# Patient Record
Sex: MALE | Race: WHITE | Employment: FULL TIME | URBAN - METROPOLITAN AREA
[De-identification: names, ages, dates, MRNs, and addresses within clinical notes are randomized per-mention and may not be internally consistent; named-entity substitution may affect disease eponyms.]

---

## 2017-01-02 ENCOUNTER — TELEPHONE (OUTPATIENT)
Dept: FAMILY MEDICINE | Facility: CLINIC | Age: 52
End: 2017-01-02

## 2017-01-02 NOTE — TELEPHONE ENCOUNTER
Reason for Call:  Other call back    Detailed comments: patients wife states patient had a stroke 3 days ago and is in the hospital, she would like to speak to Dr. Cano or his care team    Phone Number Patient can be reached at: Other phone number:  [phone]    Best Time: as soon as possible    Can we leave a detailed message on this number? YES    Call taken on 1/2/2017 at 8:08 AM by Shira Murguia

## 2017-01-02 NOTE — TELEPHONE ENCOUNTER
Patient's wife called, and she just wanted to let you know that her  had a stroke and is at an out of state hospital. She says that he is stable. They will be transferring to [place] for rehab for 4-6 weeks, she does not know where yet.    Manuel Torres RN

## 2017-01-05 ENCOUNTER — MEDICAL CORRESPONDENCE (OUTPATIENT)
Dept: HEALTH INFORMATION MANAGEMENT | Facility: CLINIC | Age: 52
End: 2017-01-05

## 2017-01-06 ENCOUNTER — HOSPITAL ENCOUNTER (INPATIENT)
Facility: CLINIC | Age: 52
LOS: 18 days | Discharge: HOME | DRG: 057 | End: 2017-01-24
Attending: PHYSICAL MEDICINE & REHABILITATION | Admitting: PHYSICAL MEDICINE & REHABILITATION
Payer: COMMERCIAL

## 2017-01-06 DIAGNOSIS — I69.351 HEMIPARESIS AFFECTING RIGHT SIDE AS LATE EFFECT OF CEREBROVASCULAR ACCIDENT (H): ICD-10-CM

## 2017-01-06 DIAGNOSIS — K59.01 SLOW TRANSIT CONSTIPATION: ICD-10-CM

## 2017-01-06 DIAGNOSIS — R49.0 DYSPHONIA: ICD-10-CM

## 2017-01-06 DIAGNOSIS — I61.9 HEMORRHAGIC STROKE (H): Primary | ICD-10-CM

## 2017-01-06 DIAGNOSIS — F51.02 ADJUSTMENT INSOMNIA: ICD-10-CM

## 2017-01-06 DIAGNOSIS — R25.2 SPASTICITY: ICD-10-CM

## 2017-01-06 DIAGNOSIS — I10 BENIGN ESSENTIAL HYPERTENSION: ICD-10-CM

## 2017-01-06 PROBLEM — I69.919 COGNITIVE DEFICITS AS LATE EFFECT OF CEREBROVASCULAR DISEASE: Status: ACTIVE | Noted: 2017-01-06

## 2017-01-06 PROBLEM — Z86.73 HISTORY OF STROKE: Status: ACTIVE | Noted: 2017-01-06

## 2017-01-06 PROBLEM — G81.91 RIGHT HEMIPARESIS (H): Status: ACTIVE | Noted: 2017-01-06

## 2017-01-06 PROBLEM — R46.89 COGNITIVE AND BEHAVIORAL CHANGES: Status: ACTIVE | Noted: 2017-01-06

## 2017-01-06 PROBLEM — R47.1 DYSARTHRIA: Status: ACTIVE | Noted: 2017-01-06

## 2017-01-06 PROBLEM — R41.89 COGNITIVE AND BEHAVIORAL CHANGES: Status: ACTIVE | Noted: 2017-01-06

## 2017-01-06 PROCEDURE — 25000125 ZZHC RX 250: Performed by: PHYSICAL MEDICINE & REHABILITATION

## 2017-01-06 PROCEDURE — 25000132 ZZH RX MED GY IP 250 OP 250 PS 637: Performed by: PHYSICAL MEDICINE & REHABILITATION

## 2017-01-06 PROCEDURE — 12800006 ZZH R&B REHAB

## 2017-01-06 RX ORDER — POLYETHYLENE GLYCOL 3350 17 G/17G
17 POWDER, FOR SOLUTION ORAL DAILY PRN
Status: DISCONTINUED | OUTPATIENT
Start: 2017-01-06 | End: 2017-01-10

## 2017-01-06 RX ORDER — HYDRALAZINE HYDROCHLORIDE 10 MG/1
10 TABLET, FILM COATED ORAL 4 TIMES DAILY PRN
Status: DISCONTINUED | OUTPATIENT
Start: 2017-01-06 | End: 2017-01-24 | Stop reason: HOSPADM

## 2017-01-06 RX ORDER — ACETAMINOPHEN 325 MG/1
650 TABLET ORAL EVERY 6 HOURS PRN
Status: DISCONTINUED | OUTPATIENT
Start: 2017-01-06 | End: 2017-01-24 | Stop reason: HOSPADM

## 2017-01-06 RX ORDER — AMOXICILLIN 250 MG
2 CAPSULE ORAL AT BEDTIME
Status: DISCONTINUED | OUTPATIENT
Start: 2017-01-06 | End: 2017-01-10

## 2017-01-06 RX ORDER — AMOXICILLIN 250 MG
1-2 CAPSULE ORAL 2 TIMES DAILY PRN
Status: DISCONTINUED | OUTPATIENT
Start: 2017-01-06 | End: 2017-01-06

## 2017-01-06 RX ORDER — LISINOPRIL 20 MG/1
20 TABLET ORAL DAILY
Status: DISCONTINUED | OUTPATIENT
Start: 2017-01-06 | End: 2017-01-13

## 2017-01-06 RX ORDER — LANOLIN ALCOHOL/MO/W.PET/CERES
3 CREAM (GRAM) TOPICAL
Status: DISCONTINUED | OUTPATIENT
Start: 2017-01-06 | End: 2017-01-18

## 2017-01-06 RX ADMIN — ENOXAPARIN SODIUM 40 MG: 40 INJECTION SUBCUTANEOUS at 16:35

## 2017-01-06 RX ADMIN — SENNOSIDES AND DOCUSATE SODIUM 2 TABLET: 8.6; 5 TABLET ORAL at 20:25

## 2017-01-06 RX ADMIN — LISINOPRIL 20 MG: 20 TABLET ORAL at 13:29

## 2017-01-06 NOTE — H&P
[facility]   Acute Rehabilitation Unit  Admission History and Physical    chief complaint   Left basal ganglia hemorrhagic CVA, hypertensive emergency    History of Present illness  Maldonado Mendiola is a 51 year old right hand dominant male history untreated hypertension presents from neurology service at , [place] after suffering left basal ganglia hemorrhagic stroke. Patient was on vacation with the family on [facility].  On Thursday 12/29/16 at approx 5:30am the patient experienced sudden onset right sided weakness while on toilet, he fell over,  Admits to associated word finding issues, and confusion at that time.  Denies any LOC, head trauma, CP, SOB, KING, loss bowel/lbadder at that time.  Wife helped him sit up and called 911 from hotel room.  EMS arrived and transported to ER with /99.  Imaging was accomplished at that time revealing for pathology above.  Initially treated in the ICU with a nicardipine drip, presumably for hypertensive emergency. No intervention. Was transitioned to lisinopril which he has reportedly been stable on.      Documentation from discharging facility was sparse.  Called and spoke to senior neurology resident, Laura Amezquita MD who stated goal BP is normotensive and that DVT prophylaxis with lovenox was not contraindicated.      Prior Functional Status: before injury reports independent mobility and ADLs    Current Functional Status:   PT - bed mobility with max assist   OT - seated ADLs with non dominant hand  SLP - right facial droop, regular thins for now. Aphasia and questionable cognitive impairment.     Restrictions: up with assist     PAST Medical History   Reviewed and updated in Epic.  Past Medical History   Diagnosis Date     Anxiety        Surgical History  Reviewed and updated in Epic.  No past surgical history on file.    SOCIAL HISTORY  Reviewed and updated in Epic.  Marital Status:   with one 15 yo son, 19 yo stepdaughter   Living situation: [facility], primary home two story with one flight up to bed/bath, two steps to enter. Lives with wife and son. They own [place] second [place] in [place] which is one story.   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    Activities: singer in band, enjoyed spending time [place]  Tobacco use: rare pipe tobacco on weekends   Alcohol use: social  Illicit drug use: denies   Social History     Social History     Marital Status:      Spouse Name: N/A     Number of Children: N/A     Years of Education: N/A     Occupational History     Not on file.     Social History Main Topics     Smoking status: Never Smoker      Smokeless tobacco: Never Used     Alcohol Use: Yes      Comment: 6 pack per week     Drug Use: No     Sexual Activity:     Partners: Female     Birth Control/ Protection: Surgical      Comment: vasectomy     Other Topics Concern     Parent/Sibling W/ Cabg, Mi Or Angioplasty Before 65f 55m? No     Social History Narrative       FAMILY HISTORY  Reviewed and updated in Epic.  Family History   Problem Relation Age of Onset     Alcohol/Drug Paternal Grandmother      CEREBROVASCULAR DISEASE No family hx of      Breast Cancer No family hx of      Cancer - colorectal No family hx of      Prostate Cancer No family hx of            Medications  Scheduled meds  Prescriptions prior to admission   Medication Sig Dispense Refill Last Dose     escitalopram (LEXAPRO) 10 MG tablet Take 1 tablet (10 mg) by mouth daily 30 tablet 1      propranolol (INDERAL) 20 MG tablet 1 to 2 pills, 30 min-1 hour prior to anxiety inducing event. 90 tablet 1 Taking     oxyCODONE-acetaminophen (PERCOCET) 5-325 MG per tablet 1 to 2 tabs if kidney stone flare 2 tablet 0 Taking     HYDROcodone-acetaminophen (NORCO) 5-325 MG per tablet Take 1 tablet by mouth every 6 hours as needed  for moderate to severe pain 42 tablet 0 Taking     ibuprofen (ADVIL) 200 MG capsule Take 200 mg by mouth every 4 hours as needed. Takes two at a time but can go up to about six with in a few hours.   Taking       ALLERGIES   No Known Allergies      Review of Systems  A 10 point ROS was performed and negative unless otherwise noted in HPI. Continent, voiding and BM on his own.       Physical Exam  VITAL SIGNS:  /69 mmHg  Pulse 68  Temp(Src) 96.5  F (35.8  C) (Oral)  Resp 16  Ht 1.829 m (6')  Wt 78.835 kg (173 lb 12.8 oz)  BMI 23.57 kg/m2  SpO2 98%  BMI:  Estimated body mass index is 23.57 kg/(m^2) as calculated from the following:    Height as of this encounter: 1.829 m (6').    Weight as of this encounter: 78.835 kg (173 lb 12.8 oz).     General: alert and oriented x 3, NAD  HEENT: AT/NC, PERRL  Pulmonary: CTA b/l, no wheezing, rhonchi, rales  Cardiovascular: S1/S2, 3/5 systolic ejection murmer heard best apex  Abdominal: Pos bowel sounds, soft, non tender, non distended  Extremities: moving all extremities, no edema, good pulses  MSK/neuro:   Mental Status:  Cooperative, appropriate   Cranial Nerves: facial droop right , otherwise grossly intact 2-12 (9/10 not examined)   Sensory: impaired sensation right face, right UE and right LE   Strength: 0/5 UE/LE right,  5/5 UE/LE left    Reflexes: normal throughout   Bueno's test: negative   Babinski reflex: negative    Tone:no increased tone, no clonus    Abnormal movements: none   Coordination: mild dysmetria with left arm vs fatigue, unable to perform right.  no pronator drift left   Speech: aphasia    Cognition: slower more deliberate speech, no other gross deficits noted converstionally. no obvious neglect, intact short term/long term memory, serial 7 ok, naming ok.   Skin: warm and dry    Labs  Not available, f/u admission labs.     IMAGING  From discharge summary   --CT head 12/29: left BG IPH with 2 smaller foci of hemorrhage noted in this  area  --CTA head / neck 12/29: w/o evidence of AVM or aneurysm, no significant cervical carotid or intracranial stenosis. Mild mid basilar artery stenosis (likley related to atherosclerotic disease)  --EKG with NSR    IMPRESSION/PLAN:  Maldonado Mendiola is a 51 year old right hand dominant male history untreated hypertension with new left basal ganglia stroke 2/2 HTN emergency.  Profound right sided weakness, mild aphasia, right facial droop.  [place] out of state hospital, Admitted acute inpatient rehab 01/06/2017.        1.  Rehabilitation: begin acute inpatient rehabilitation from a multidisciplinary approach.  PT, OT and SLP  For total 3 hours daily, in addition to rehab nursing and close management of physiatrist.      2.  Left basal ganglia stroke: profound right sided weakness UE/LE, aphasia.    -BP control, goal normotensive    -continue lisinopril 20mg daily   -prn hydralazine if needed   -rehab plan above   -f/u neurology / stroke service locally (needs new pt visit or consult)    3.  Hypertension;  Recently treated for HTN emergency s/p IPH,  Has been stable on lisinipril 20mg daily which is continued.  Given history we've added prn hydralazine for BP >150/95.  Consider IM consult if becomes diff to control.      4.  Heart murmer: 3/5 systolic ejection murmer best heard at apex. Pt states informed about this during his acute hospital stay this week but before that was unaware.  Appears stable without signs of heart failure.  Denies CP, SOB, swelling. Given history including HTN emergency feel may warrant workup. EKG reportedly normal per chart. Consider echocardiogram. IM/cards f/u if appropriate.     5.  Risk of electrolyte abnormality: f/u admission BMP    6.  Risk of anemia: f/u admission CBC     7.  F/E/N:  Appears well hydrated, f/u BMP, tolerating regular diet / thins continued, f/u SLP eval.       Additional rehab concerns:   Adjustment to disability:  Clinical psychology to eval and treat  if appropriate  FEN: well hydrated, tolerating regular diet, nutrition consult if appropriate.  Bowel: bowel program if deemed necessary   Bladder: PVRs and/or bladder program if deemed necessary  DVT Prophylaxis: chemical with Lovenox daily.  Significantly impaired mobility.    GI Prophylaxis: PPI or BID zantac if appropriate  Code: Full code   Disposition: Home  ELOS:  3-4 weeks   Rehab prognosis:  Fair - Good   Follow up Appointments / Discharge planning:   F/u with PCP  Establish care with neurology/stroke provider locally  Consider evaluation of heart murmur     Seen and discussed with Rm PM&R staff physician   Rashad Barrientos, DO  PM&R, PGY2    Post Admission Physician Evaluation:    I have compared Maury's condition on admission to acute rehabilitation to that outlined in the preadmission screen. History and physical exam performed by me. No significant differences are identified and the patient remains appropriate for an inpatient rehabilitation facility level of care to manage medical issues and address functional impairments due to (rehabilitation diagnosis) his hemorrhagic stroke.    Comorbid medical conditions being managed: as listed above.    Prior functional level: completely independent.    Present function: severe dependency in mobility, ADLs, with aphasia and possible cognitive deficits.    Anticipated rehabilitation course: improving function in order to discharge home with help of family in approximately 3-4 weeks. May need use of Wheelchair at time of discharge depending upon progress.    Will benefit from intensive rehabilitation includin minutes each of PT, OT and SLP    Rehabilitation nursing  Close management by physiatry    Prognosis: good    Estimated length of stay: 3-4 weeks      I, Dr. Abdalla, have seen and examined the patient. I have reviewed the above resident's note and agree with content.

## 2017-01-06 NOTE — IP AVS SNAPSHOT
[facility]    [address] [facility] [address]    Phone:  [phone]                                       After Visit Summary   1/6/2017    Maldonado Mendiola    MRN: [id number]           After Visit Summary Signature Page     I have received my discharge instructions, and my questions have been answered. I have discussed any challenges I see with this plan with the nurse or doctor.    ..........................................................................................................................................  Patient/Patient Representative Signature      ..........................................................................................................................................  Patient Representative Print Name and Relationship to Patient    ..................................................               ................................................  Date                                            Time    ..........................................................................................................................................  Reviewed by Signature/Title    ...................................................              ..............................................  Date                                                            Time

## 2017-01-06 NOTE — IP AVS SNAPSHOT
MRN:[id number]                      After Visit Summary   1/6/2017    Maldonado Mendiola    MRN: [id number]           Thank you!     Thank you for choosing [facility] for your care. Our goal is always to provide you with excellent care. Hearing back from our patients is one way we can continue to improve our services. Please take a few minutes to complete the written survey that you may receive in the mail after you visit with us. Thank you!        Patient Information     Date Of Birth          1965        About your hospital stay     You were admitted on:  January 6, 2017 You last received care in the:   [facility]    You were discharged on:  January 24, 2017        Reason for your hospital stay       Admitted to acute rehab after your stroke. Pleased with some of the initial functional recovery and ready for discharge and follow up outpatient therapies.                  Who to Call     For medical emergencies, please call 911.  For non-urgent questions about your medical care, please call your primary care provider or clinic, [phone]          Attending Provider     Provider    Sumeet Abdalla MD Crowe, Scott C, MD       Primary Care Provider Office Phone # Fax #    Edmond Cano PA-C [phone]       [address] [address]        After Care Instructions     Activity       Your activity upon discharge: recommend some supervision initially on stairs until that's more thoroughly solid in performance. Giv-Lizz sling may help with comfort and shoulder position but doesn't have to be worn all of the time. No driving at this time. Outpatient occupational therapist may screen this as you move forward when may be safe though often a formal behind the wheel assessment.            Diet       Follow this diet upon discharge: Regular consistency now fine. See stroke book for healthy diet information.                  Your  next 10 appointments already scheduled     Jan 25, 2017  2:00 PM   Evaluation with Raghav Matute, OT   [facility], [place], Occupational Therapy - Outpatient ([facility])    [address]  [place] [address]   [phone]            Jan 26, 2017  8:30 AM   Evaluation with Rochelle Capone, PT   [facility], [place], Physical Therapy - Outpatient ([facility])    [address]  [place] [address]   [phone]            Jan 30, 2017  2:20 PM   SHORT with Edmond Cano PA-C   [facility] ([facility])    [address] [place] [id number] [phone]            Mar 14, 2017  9:00 AM   (Arrive by 8:45 AM)   New Stroke with Franklin Hidalgo MD   [facility] Neurology ([facility])    [address] [address] [phone]            Mar 15, 2017  8:40 AM   (Arrive by 8:25 AM)   New Patient Visit with Miguel Beauchamp MD   [facility] Physical Medicine and Rehabilitation ([facility])    [address] [address] [phone]              Additional Services     Occupational Therapy Referral       [facility] provides Therapy evaluation and treatment and many specialty services across the [place] system.  If requesting a specialty program, please choose from the list below.    If you have not heard from the scheduling office within 2 business days, please call [phone].  Treatment: Evaluation & Treatment  Special Instructions/Modalities: continue outpatient PT, OT and SLP after discharge from acute rehab for hemorrhagic stroke.    Please be aware that coverage of these services is subject to the terms and limitations of your health insurance plan.  Call member services at your health plan with any benefit  or coverage questions.            Physical Therapy Referral       [facility] provides Therapy evaluation and treatment and many specialty services across the [facility].  If requesting a specialty program, please choose from the list below.    If you have not heard from the scheduling office within 2 business days, please call [phone].  Treatment: Evaluation & Treatment  Special Instructions/Modalities: continue outpatient PT, OT and SLP after discharge from acute rehab for hemorrhagic stroke.    Please be aware that coverage of these services is subject to the terms and limitations of your health insurance plan.  Call member services at your health plan with any benefit or coverage questions.            Speech Therapy Referral       [facility] provides Therapy evaluation and treatment and many specialty services across the [facility] system.  If requesting a specialty program, please choose from the list below.    If you have not heard from the scheduling office within 2 business days, please call [phone].  Treatment: Evaluation & Treatment  Special Instructions/Modalities: continue outpatient PT, OT and SLP after discharge from acute rehab for hemorrhagic stroke.    Please be aware that coverage of these services is subject to the terms and limitations of your health insurance plan.  Call member services at your health plan with any benefit or coverage questions.                  Further instructions from your care team       Follow-Up Appointments:      You are scheduled to see primary provider, Edmond Cano PA-C  on Monday, January 30, 2017 at 2:20 pm.  Blood pressure management and refill prescriptions should go through primary.    Address  Edmond Cano PA-C                          [address]                           [place] [address]  Phone    [phone]  FAX                  [phone]        You are scheduled to see Dr. Hidalgo at [facility] on Tuesday, March 14, 2017 at 8:45 am.    Address- Dr. Rocky FAY of [facility]                          Neurology Clinic                             3rd Floor    [address] [place] [address]  Phone   [phone]        You are scheduled to see Dr. Miguel Beauchamp on Wednesday, March 15, 2017 at 8:40 am.    Address  Dr. Miguel FAY of [facility]                          Physical Medicine and Rehabilitation Clinic                          3rd Floor    [address] [place] [address]  Phone   Shivani [phone]                                                       To reduce the risk of subsequent stroke there are several important factors including optimal management of anticoagulants, blood pressure, cholesterol, diabetes and smoking abstinence.    Blood Pressure:  Keeping your blood pressures less than 130/80 has been shown to reduce risk of recurrent stroke. Recording your blood pressure and heart rate daily can help you and your providers make decisions on optimal management. You are encouraged to bring your log book with you to your primary physician.    You are currently on lisinopril to help control your blood pressure. Several lifestyle modifications have been associated with blood pressure reduction and are an important part of a comprehensive plan. These include: weight loss (if over-weight); a diet low in salt and cholesterol and rich in fruits and vegetables; regular aerobic physical activity and limited alcohol consumption.    Of note Mac, your blood pressures have started to drift bit lower which might allow for reduction of your lisinopril dose to 5 mg or even consider going off eventually.    Anticoagulation:  Your stroke type was bleeding (hemorrhagic) so no anticoagulation is  "indicated.    Diabetes:  You do not have diabetes though it is important to continue monitoring for this in the future with your primary provider.    Cholesterol:  Traditional target levels for LDL cholesterol or \"bad cholesterol\" is less than 130 however once you have had a stroke, your target LDL level is now less than 70. Additional recommendations such as increasing your HDL or \"good\" cholesterol and lowering your triglyceride level can also be important.        Smoking:  Finally one of the most important modifiable risk factors is to not smoke so don't start now Mac... This includes cigarettes, pipes, cigars, chewing tobacco and second hand smoke. Support through counseling, nicotine replacement, and oral smoking-cessation medications may all be helpful. Often people have been able to quit during their hospitalization but once returning to their familiar environment, the urges can be stronger. If this is the case, we encourage you to get support. There are numerous options, start by talking with your doctor.      Pending Results     No orders found from 1/5/2017 to 1/7/2017.            Statement of Approval     Ordered          01/24/17 0851  I have reviewed and agree with all the recommendations and orders detailed in this document.   EFFECTIVE NOW     Approved and electronically signed by:  Miguel Beauchamp MD             Admission Information        Provider Department Dept Phone    1/6/2017 Miguel Beauchamp MD  [facility] [phone]      Your Vitals Were     Blood Pressure Pulse Temperature    108/58 mmHg 86 98.6  F (37  C) (Oral)    Respirations Height Weight    16 1.829 m (6') 71.759 kg (158 lb 3.2 oz)    BMI (Body Mass Index) Pulse Oximetry       21.45 kg/m2 98%       MyChart Information     [facility] lets you send messages to your doctor, view your test results, renew your prescriptions, schedule appointments and more. To sign up, go to [email or web address] . Click on \"Log in\" on the left side " "of the screen, which will take you to the Welcome page. Then click on \"Sign up Now\" on the right side of the page.     You will be asked to enter the access code listed below, as well as some personal information. Please follow the directions to create your username and password.     Your access code is: [email or web address]  Expires: 2017 12:15 PM     Your access code will  in 90 days. If you need help or a new code, please call your [facility] clinic or [phone].        Care EveryWhere ID     This is your Care EveryWhere ID. This could be used by other organizations to access your [facility] medical records  [id number]           Review of your medicines      START taking        Dose / Directions    baclofen 10 MG tablet   Commonly known as:  LIORESAL   Used for:  Spasticity        Dose:  5-10 mg   Take 0.5-1 tablets (5-10 mg) by mouth 3 times daily   Quantity:  90 tablet   Refills:  3       diphenhydrAMINE 25 MG capsule   Commonly known as:  BENADRYL   Used for:  Adjustment insomnia        Dose:  25-50 mg   Take 1-2 capsules (25-50 mg) by mouth nightly as needed for sleep   Refills:  0       lisinopril 10 MG tablet   Commonly known as:  PRINIVIL/ZESTRIL   Used for:  Benign essential hypertension        Dose:  10 mg   Take 1 tablet (10 mg) by mouth daily   Quantity:  30 tablet   Refills:  0       senna-docusate 8.6-50 MG per tablet   Commonly known as:  SENOKOT-S;PERICOLACE   Used for:  Slow transit constipation        Dose:  1-2 tablet   Take 1-2 tablets by mouth daily as needed for constipation   Refills:  0         STOP taking     ADVIL 200 MG capsule   Generic drug:  ibuprofen           escitalopram 10 MG tablet   Commonly known as:  LEXAPRO           HYDROcodone-acetaminophen 5-325 MG per tablet   Commonly known as:  NORCO           oxyCODONE-acetaminophen 5-325 MG per tablet   Commonly known as:  PERCOCET           propranolol 20 MG tablet   Commonly known as:  INDERAL                Where to get your " medicines      These medications were sent to [id number] - [place], [place] - [address]  [address], [place] [address]     Phone:  [phone]    - baclofen 10 MG tablet  - lisinopril 10 MG tablet      Some of these will need a paper prescription and others can be bought over the counter. Ask your nurse if you have questions.     You don't need a prescription for these medications    - diphenhydrAMINE 25 MG capsule  - senna-docusate 8.6-50 MG per tablet             Protect others around you: Learn how to safely use, store and throw away your medicines at www.disposemymeds.org.             Medication List: This is a list of all your medications and when to take them. Check marks below indicate your daily home schedule. Keep this list as a reference.      Medications           Morning Afternoon Evening Bedtime As Needed    baclofen 10 MG tablet   Commonly known as:  LIORESAL   Take 0.5-1 tablets (5-10 mg) by mouth 3 times daily   Last time this was given:  5 mg on 1/24/2017  6:39 AM                                diphenhydrAMINE 25 MG capsule   Commonly known as:  BENADRYL   Take 1-2 capsules (25-50 mg) by mouth nightly as needed for sleep   Last time this was given:  50 mg on 1/23/2017  8:54 PM                                   lisinopril 10 MG tablet   Commonly known as:  PRINIVIL/ZESTRIL   Take 1 tablet (10 mg) by mouth daily   Last time this was given:  10 mg on 1/24/2017  7:59 AM                                senna-docusate 8.6-50 MG per tablet   Commonly known as:  SENOKOT-S;PERICOLACE   Take 1-2 tablets by mouth daily as needed for constipation   Last time this was given:  2 tablets on 1/24/2017  6:43 AM

## 2017-01-06 NOTE — PLAN OF CARE
Problem: Goal/Outcome  Goal: Goal Outcome Summary  Pt arrived to the unit from [facility], outside hospital at about 1130 and admitted to room 545. He has right sided weakness re to diagnosis, he needed assist of one person to get in to bed. He was alert and oriented x 3,denies pain or discomfort. Pt/ family were oriented to the room, call light system, bed control, meal times and routines of the unit. He needs a complete skin assessment, PTA assessment over the next 24 hrs.

## 2017-01-07 LAB
ANION GAP SERPL CALCULATED.3IONS-SCNC: 8 MMOL/L (ref 3–14)
BASOPHILS # BLD AUTO: 0 10E9/L (ref 0–0.2)
BASOPHILS NFR BLD AUTO: 0.4 %
BUN SERPL-MCNC: 23 MG/DL (ref 7–30)
CALCIUM SERPL-MCNC: 8.3 MG/DL (ref 8.5–10.1)
CHLORIDE SERPL-SCNC: 104 MMOL/L (ref 94–109)
CO2 SERPL-SCNC: 25 MMOL/L (ref 20–32)
CREAT SERPL-MCNC: 0.68 MG/DL (ref 0.66–1.25)
DIFFERENTIAL METHOD BLD: NORMAL
EOSINOPHIL # BLD AUTO: 0.2 10E9/L (ref 0–0.7)
EOSINOPHIL NFR BLD AUTO: 4 %
ERYTHROCYTE [DISTWIDTH] IN BLOOD BY AUTOMATED COUNT: 12.8 % (ref 10–15)
GFR SERPL CREATININE-BSD FRML MDRD: ABNORMAL ML/MIN/1.7M2
GLUCOSE SERPL-MCNC: 90 MG/DL (ref 70–99)
HCT VFR BLD AUTO: 45.2 % (ref 40–53)
HGB BLD-MCNC: 15.5 G/DL (ref 13.3–17.7)
IMM GRANULOCYTES # BLD: 0 10E9/L (ref 0–0.4)
IMM GRANULOCYTES NFR BLD: 0.4 %
LYMPHOCYTES # BLD AUTO: 1.4 10E9/L (ref 0.8–5.3)
LYMPHOCYTES NFR BLD AUTO: 26.7 %
MCH RBC QN AUTO: 32.1 PG (ref 26.5–33)
MCHC RBC AUTO-ENTMCNC: 34.3 G/DL (ref 31.5–36.5)
MCV RBC AUTO: 94 FL (ref 78–100)
MONOCYTES # BLD AUTO: 0.4 10E9/L (ref 0–1.3)
MONOCYTES NFR BLD AUTO: 7.9 %
NEUTROPHILS # BLD AUTO: 3.2 10E9/L (ref 1.6–8.3)
NEUTROPHILS NFR BLD AUTO: 60.6 %
NRBC # BLD AUTO: 0 10*3/UL
NRBC BLD AUTO-RTO: 0 /100
PLATELET # BLD AUTO: 199 10E9/L (ref 150–450)
POTASSIUM SERPL-SCNC: 4 MMOL/L (ref 3.4–5.3)
RBC # BLD AUTO: 4.83 10E12/L (ref 4.4–5.9)
SODIUM SERPL-SCNC: 137 MMOL/L (ref 133–144)
WBC # BLD AUTO: 5.3 10E9/L (ref 4–11)

## 2017-01-07 PROCEDURE — 92523 SPEECH SOUND LANG COMPREHEN: CPT | Mod: GN | Performed by: SPEECH-LANGUAGE PATHOLOGIST

## 2017-01-07 PROCEDURE — 97535 SELF CARE MNGMENT TRAINING: CPT | Mod: GO | Performed by: STUDENT IN AN ORGANIZED HEALTH CARE EDUCATION/TRAINING PROGRAM

## 2017-01-07 PROCEDURE — 97162 PT EVAL MOD COMPLEX 30 MIN: CPT | Mod: GP

## 2017-01-07 PROCEDURE — 36415 COLL VENOUS BLD VENIPUNCTURE: CPT | Performed by: PHYSICAL MEDICINE & REHABILITATION

## 2017-01-07 PROCEDURE — 40000133 ZZH STATISTIC OT WARD VISIT: Performed by: STUDENT IN AN ORGANIZED HEALTH CARE EDUCATION/TRAINING PROGRAM

## 2017-01-07 PROCEDURE — 25000132 ZZH RX MED GY IP 250 OP 250 PS 637: Performed by: PHYSICAL MEDICINE & REHABILITATION

## 2017-01-07 PROCEDURE — 40000193 ZZH STATISTIC PT WARD VISIT

## 2017-01-07 PROCEDURE — 97530 THERAPEUTIC ACTIVITIES: CPT | Mod: GP

## 2017-01-07 PROCEDURE — 85025 COMPLETE CBC W/AUTO DIFF WBC: CPT | Performed by: PHYSICAL MEDICINE & REHABILITATION

## 2017-01-07 PROCEDURE — 25000125 ZZHC RX 250: Performed by: PHYSICAL MEDICINE & REHABILITATION

## 2017-01-07 PROCEDURE — 80048 BASIC METABOLIC PNL TOTAL CA: CPT | Performed by: PHYSICAL MEDICINE & REHABILITATION

## 2017-01-07 PROCEDURE — 97167 OT EVAL HIGH COMPLEX 60 MIN: CPT | Mod: GO | Performed by: STUDENT IN AN ORGANIZED HEALTH CARE EDUCATION/TRAINING PROGRAM

## 2017-01-07 PROCEDURE — 97532 ZZHC SP COGNITIVE SKILLS EA 15 MIN: CPT | Mod: GN | Performed by: SPEECH-LANGUAGE PATHOLOGIST

## 2017-01-07 PROCEDURE — 97116 GAIT TRAINING THERAPY: CPT | Mod: GP

## 2017-01-07 PROCEDURE — 12800006 ZZH R&B REHAB

## 2017-01-07 PROCEDURE — 40000225 ZZH STATISTIC SLP WARD VISIT: Performed by: SPEECH-LANGUAGE PATHOLOGIST

## 2017-01-07 RX ADMIN — ENOXAPARIN SODIUM 40 MG: 40 INJECTION SUBCUTANEOUS at 13:55

## 2017-01-07 RX ADMIN — LISINOPRIL 20 MG: 20 TABLET ORAL at 07:36

## 2017-01-07 RX ADMIN — SENNOSIDES AND DOCUSATE SODIUM 2 TABLET: 8.6; 5 TABLET ORAL at 20:50

## 2017-01-07 NOTE — PROGRESS NOTES
01/07/17 1022   Quick Adds   Type of Visit Initial Occupational Therapy Evaluation   Living Environment   Lives With spouse;child(frida), dependent   Living Arrangements house   Number of Stairs to Enter Home 2   Number of Stairs Within Home 14   Transportation Available car;family or friend will provide   Living Environment Comment 2 story home with bedrooms and shower on second level. Pt thinks it is WC accessible opnt he main level. Walk in shower on second level. 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 Pt has a cabin in [place] they go to alot and he boats and bonfires.   Functional Level Prior   Ambulation 0-->independent   Transferring 0-->independent   Toileting 0-->independent   Bathing 0-->independent   Dressing 0-->independent   Eating 0-->independent   Communication 0-->understands/communicates without difficulty   Swallowing 0-->swallows foods/liquids without difficulty   Cognition 0 - no cognition issues reported   Fall history within last six months no   Prior Functional Level Comment PLOF was I with self cares and IADL   General Information   Onset of Illness/Injury or Date of Surgery - Date 12/29/16   Referring Physician Dr Abdalla   Patient/Family Goals Statement to get his R side moving again   Additional Occupational Profile Info/Pertinent History of Current Problem Pt admitted to hospital in [facility] for L basal ganglia stroke. transferred to ARU for therapy. Pt's indpendence with all self cares, IADLs, functional mobility, transfers, possibly cognition and ability to participate in hobbies and work have all been affected.    Precautions/Limitations fall precautions   General  Observations Wife present for session   Cognitive Status Examination   Orientation orientation to person, place and time   Level of Consciousness alert   Able to Follow Commands WNL/WFL   Personal Safety (Cognitive) WNL/WFL   Cognitive Comment Pt and fredrick feel his cogntiiton is the same just slwoer to find words. Will assess further.    Visual Perception   Visual Perception Comments Pt denies. Pt wears reading glasses. Able to read wall board, clock on wall and menu. pt also had books int he room and reports he can read them.    Sensory Examination   Sensory Comments reports that his R side does not feel sensation as well. Sensation is dull.    Pain Assessment   Patient Currently in Pain No   Range of Motion (ROM)   ROM Comment RUE intact PROM, LUE WNL   Strength   Strength Comments LUE WFL, RUE flaccid. Slight elevation in R shoulder. and rhomboids for blade squeezes and internal rotation.    Hand Strength   Hand Strength Comments L hand flaccid, no  detected.    Coordination   Coordination Comments Pt has tremor in LUE affecting accuracy of movement during cooridnation tasks. Pt reports this is baseline.    ARC Assessment Only   Acute Rehab FIM See FIM scores for Mobility/ADL Assessment   Instrumental Activities of Daily Living (IADL)   IADL Comments I with IADL at baseline including working, household activites and hobbies.    Activities of Daily Living Analysis   Impairments Contributing to Impaired Activities of Daily Living balance impaired;cognition impaired;coordination impaired;motor control impaired;sensory feedback impaired;sensation decreased;strength decreased;ROM decreased   General Therapy Interventions   Planned Therapy Interventions ADL retraining;IADL retraining;cognition;bed mobility training;neuromuscular re-education;strengthening;transfer training;progressive activity/exercise;fine motor coordination training  (FES)   Clinical Impression   Criteria for Skilled Therapeutic Interventions Met  yes, treatment indicated   OT Diagnosis ADl and functional mobility deficits   Influenced by the following impairments R side ramírez, balance, decreased sensation on R side, possible cognition, tremor in non affected UE, decreased I with self care sand functional mobility   Assessment of Occupational Performance 5 or more Performance Deficits   Identified Performance Deficits Pt has deficits affecting his I with all ADL, IADL such as work, driving, housework, cooking, hobbies such as playing guitar and doign activtities at the cabin.    Clinical Decision Making (Complexity) High complexity   Therapy Frequency daily   Predicted Duration of Therapy Intervention (days/wks) 2 weeks   Anticipated Equipment Needs at Discharge (TBD)   Anticipated Discharge Disposition Home with Assist;Home with Outpatient Therapy   Risks and Benefits of Treatment have been explained. Yes   Patient, Family & other staff in agreement with plan of care Yes   Total Evaluation Time   Total Evaluation Time (Minutes) 30

## 2017-01-07 NOTE — PLAN OF CARE
Problem: Goal/Outcome  Goal: Goal Outcome Summary  FOCUS/GOAL  Bowel management, Bladder management, Nutrition/Feeding/Swallowing precautions, Mobility, Cognition/Memory/Judgment/Problem solving and Safety management    ASSESSMENT, INTERVENTIONS AND CONTINUING PLAN FOR GOAL:  Pt was admitted from [facility] , had a stroke last 12/292016 while vacationing in [facility] with his family  , alert and oriented x3 but with some cognitive deficits  With slurred speech but could easly be understood , verbalized needs well and used call light appropriately , rt upper and rt lower extremities are flaccid , left extremities has a good rom , stayed on bed this evening and able to repositioned self , denies pain , skin is intact , per report he transferred with min assist of 1 , able to wash face and hands and brush teeth with set up , min assist with pericare ,voiding spontaneously , PVR was 14 after he voided at 5pm using the urinal used urinal with set up , pt said his LBM was this AM currently on bowel maintenance with bed alrm on as fall reduction measures .

## 2017-01-07 NOTE — PLAN OF CARE
Problem: Goal/Outcome  Goal: Goal Outcome Summary  Outcome: Improving  Performed eval.  Scored 24/36 on PASS.  Pt requires min A x 1 for stand pivot transfers with hemiwalker.  Anticipate LOS 2-3 weeks with recommending [facility] PT vs outpatient PT upon discharge.  Pt's current home is not handicapped accessible but does have [place] in [place] that is if needed.  Spouse present throughout both sessions and is very involved with pt's care.

## 2017-01-07 NOTE — PROGRESS NOTES
[facility] Acute Rehabilitation Unit  Progress Note    Subjective  Feels well overall   In bed, working with PT this am   Met wife also in room   Discussed some general concepts of neurorecovery after stroke      Assessment and Plan of Care: Maldonado Mendiola is a 51 year old right hand dominant male history untreated hypertension with new left basal ganglia stroke 2/2 HTN emergency.  Profound right sided weakness, mild aphasia, right facial droop.  [place] [facility], Admitted acute inpatient rehab 01/06/2017.      Functionally:  Initial therapy evals today.   Continue therapies and plan of care.      Overall Management:  - manage HTN, on lisinopril and prn hydralazine  - on lovenox for DVT ppx (benefit outweigh risk ? Was using at [place] [facility] and transitioned to here - on admit had discussion between admission team and neurology team in [place] that dvt ppx not contrindicated)  Cont ongoing management and plan of care, early in course, track progress and needs         Active Diet Order  Regular Diet Adult    Labs  Last Basic Metabolic Panel:  NA      137   1/7/2017   POTASSIUM      4.0   1/7/2017  CHLORIDE      104   1/7/2017  RADHA      8.3   1/7/2017  CO2       25   1/7/2017  BUN       23   1/7/2017  CR     0.68   1/7/2017  GLC       90   1/7/2017    WBC      5.3   1/7/2017  RBC     4.83   1/7/2017  HGB     15.5   1/7/2017  HCT     45.2   1/7/2017  No components found with this name: mct  MCV       94   1/7/2017  MCH     32.1   1/7/2017  MCHC     34.3   1/7/2017  RDW     12.8   1/7/2017  PLT      199   1/7/2017        Medications:  Current Facility-Administered Medications   Medication     lisinopril (PRINIVIL/ZESTRIL) tablet 20 mg     polyethylene glycol (MIRALAX/GLYCOLAX) Packet 17 g     enoxaparin (LOVENOX) injection 40 mg     senna-docusate (SENOKOT-S;PERICOLACE) 8.6-50 MG per tablet 2 tablet     acetaminophen (TYLENOL) tablet 650 mg     melatonin tablet 3 mg      hydrALAZINE (APRESOLINE) tablet 10 mg         Physical Examination:   /64 mmHg  Pulse 66  Temp(Src) 97.9  F (36.6  C) (Oral)  Resp 16  Ht 1.829 m (6')  Wt 78.835 kg (173 lb 12.8 oz)  BMI 23.57 kg/m2  SpO2 95%  In bed  Conversant and pleasant   Unlabored breathing   R UE flaccid, no sig voluntary movement, neg leong's  R LE 3+/5 hip flex, knee ext with limited range, no sig ankle DF / PF  Clonus R ankle 4 beats        More than 35 minutes spent with at least half on care coordination

## 2017-01-07 NOTE — H&P
POST-ADMISSION PHYSICIAN EVALUATION       DATE OF ADMISSION:  2017      I have compared Mr. Petey De Leon's condition on admission to acute rehabilitation to that outlined in the preadmission screen.  History and physical exam were performed by me.  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      COMORBID MEDICAL CONDITIONS BEING MANAGED:   Hypertension and hemorrhagic stroke.      PRIOR FUNCTIONAL LEVEL:  Patient was totally independent in all activities prior to his hemorrhagic stroke.      PRESENT FUNCTION:  Patient is essentially right hemiplegic at this point in time.  He has significant deficits of  mobility and ADLs.  He has cognitive deficits.      ANTICIPATED REHABILITATION COURSE:  It is anticipated that the patient will progress with acute inpatient rehabilitation.  He is very motivated.  It is anticipated he will progress to a level of modified independence, most probably in wheelchair mobility at time of discharge.  The patient will in all probability require assistance of family at time of discharge.      The patient will benefit from intensive rehabilitation including 60 minutes each of PT, OT and speech and language pathology therapies, rehabilitation nursing and close management by Physiatry.        REHABILITATION PROGNOSIS:  Good to fair.        MEDICAL PROGNOSIS:  Good.      ESTIMATED LENGTH OF STAY:  21 to 28 days.         MALATHI BENNETT MD             D: 2017 15:17   T: 2017 19:30   MT: SUNNY      Name:     PETEY DE LEON   MRN:      [id number]        Account:      [id number]   :      1965           Admitted:     [id number]      Document: [id number]

## 2017-01-07 NOTE — PLAN OF CARE
Problem: PT General Care Plan  Goal: Stairs (PT)  PT Stairs   Pt will ascend/descend 2 steps with AD with SBA

## 2017-01-07 NOTE — PLAN OF CARE
Problem: Goal/Outcome  Goal: Goal Outcome Summary  FOCUS/GOAL  Bladder management, Medical management and Safety management    ASSESSMENT, INTERVENTIONS AND CONTINUING PLAN FOR GOAL:  Pt reported no pain during shift. Spouse at bedside for majority of shift. Set-up and emptying assistance for urinal use. Absent right  strength, weak right dorsiflexion and plantarflexion. Pt uses call light appropriately to express needs. Continue with POC.

## 2017-01-07 NOTE — PLAN OF CARE
Problem: Goal/Outcome  Goal: Goal Outcome Summary  Outcome: No Change  FOCUS/GOAL  Bladder management and Mobility    ASSESSMENT, INTERVENTIONS AND CONTINUING PLAN FOR GOAL:  Pt has been sleeping well through the night. He has been awakened for toileting to prevent incontinence, but pt declined stating he did not need to urinate. Pt is able to position himself in bed.

## 2017-01-07 NOTE — PLAN OF CARE
Problem: Goal/Outcome  Goal: Goal Outcome Summary  Completed formal speech- language eval and informal cognitive assessment. Pt presents with minimal flaccid dysarthria which impacts some of his articulatory precision and rate and rhythm with speaking. Pt also demonstrates some mild word retrieval difficulties and minimal to mild reduction in verbal fluency - however pt is able to express himself independently. Pt demonstrates intact attention, but mildly impaired recent verbal memory for more lengthier info. Pt also demonstrates some mild deficit in verbal reasoning/ mental flexibility. All other areas of language ( auditory comprehension, reading comprehension are  intact). Pt will benefit from skilled SLP intervention to improved cognitive - communication skills for safety and indpendence in ADL's.    Pt is currently on a regular diet with thin liquids and reportedly is tolerating without difficulty. During speech-lang eval- screened swallowing an no difficulties were noted- so not needing a formal swallow eval at this time.

## 2017-01-07 NOTE — PROGRESS NOTES
01/07/17 1500   General Information, SLP   Type of Evaluation Speech and Language;Cognitive-Linguistic   Type of Visit Initial   Start of Care Date 01/07/17   Onset of Illness/Injury or Date of Surgery - Date 12/29/16   Referring Physician Sumeet Abdalla MD   Patient/Family Goals Statement to return to former level of activity   Pertinent History of Current Problem Maldonado Mendiola is a 51 year old right hand dominant male history untreated hypertension presents from neurology service at [facility], [place] after suffering left basal ganglia hemorrhagic stroke. Patient was on vacation with the family on [facility].  On Thursday 12/29/16 at approx 5:30am the patient experienced sudden onset right sided weakness while on toilet, he fell over,  Admits to associated word finding issues, and confusion at that time.    Precautions/Limitations fall precautions   General Observations Pleasant and cooperatie   Oral Motor Sensory Function   Completed on Swallow Evaluation (screened- WFL)   Speech   Deficits in Articulation Flaccid dysarthria   Speech Comments Very minimal flaccied dysarthria   Language: Auditory Comprehension (understanding of spoken language)   Tests were administered at the following levels Complex (vocation/community/social activities)   Paragraph; Discourse Comprehension Test (out of 8 total; less than 7 is below mean) 7   Functional Assessment Scale (Auditory Comprehension) No Impairment   Language: Verbal Expression (use of spoken language to express information)   Tests were administered at the following levels Complex (vocation/community/social activities)   Define Words; [place] Test for Differential Diagnosis Of Aphasia (out of 10 total) 10   Generative Naming Score; Cognitive Linguistic Quick Test 5   Generative Naming; Cognitive Linguistic Quick Test Result Below mean   Conversation; [place] Diagnostic Aphasia Exam rating (out of 5 total) 4   Functional Assessment Scale  (Verbal Expression) Mild Impairment   Comments (Verbal Expression) Pthas some mild word retrieval difficulties- some mild hesitation and reduced verbal fluency noted in conversation and structured tasks.    Reading Comprehension (understanding of written language)   Tests were administered at the following levels Complex (vocation/community/social activities)   Sentences and Paragraphs; [place] Diagnostic Aphasia Exam (out of 10 total) 10   Functional Assessment Scale (Reading Comprehension) No Impairment   Written Expression (use of writing to express information)   Functional Assessment Scale (Written Expression) Not Tested (see Comment)   Comments (Written Expression) Not tested 2/2 impaired right UE function   Pragmatics (the social or functional use of a language)   Deficits noted in Nonverbal None   Deficits noted in Conversational Skills None   Deficits noted in the Use of Linguistic Context None   Deficits noted in the Organization of Narrative; RICE None   Functional Assessment Scale  (Pragmatics) No Impairment   Cognitive Status Examination   Attention intact  (scoring 100% on sustained; flexible and alternating attentio)   Behavioral Observations WFL   Orientation intact   Short Term Memory impaired  (3/3 delayed recall; 5/15 paragraph recall)   Long Term Memory intact   Organization impaired  (4/4 mod prob solv; 2/6 mod- complex verbal reasoning; )   Executive Function Deficits Noted mental flexibility   Additional cognitive-linguistic evaluation indicated  yes   Standardized cognitive-linguistic assessment completed to be completed during future session   Cognitive Status Exam Comments Pt iwth milid to mild- moderte deficits in recent recall; complex reasoning and numerics   General Therapy Interventions   Planned Therapy Interventions Cognitive Treatment;Language;Communication   Cognitive treatment Internal memory strategy training;External memory strategy training   Language Verbal expression    Communication Improve speech intelligibility   Clinical Impression, SLP Eval   Criteria for Skilled Therapeutic Interventions Met Yes;Treatment indicated   SLP Diagnosis Mild cognitive- communication impairments   Functional limitations due to impairments right sided weakness-- impacts ability to write with right hand   Rehab Potential Good, to achieve stated therapy goals   Therapy Frequency Daily   Predicted Duration of Therapy Intervention (days/wks) 2 weeks   Risks and Benefits of Treatment have been explained. Yes   Patient, Family & other staff in agreement with plan of care Yes   Clinical Impression Comments Completed formal speech- language eval and informal cognitive assessment. Pt presents with minimal flaccid dysarthria which impacts some of his articulatory precision and rate and rhythm with speaking. Pt also demonstrates some mild word retrieval difficulties and minimal to mild reduction in verbal fluency - however pt is able to express himself independently. Pt demonstrates intact attention, but mildly impaired recent verbal memory for more lengthier info. Pt also demonstrates some mild deficit in verbal reasoning/ mental flexibility. All other areas of language ( auditory comprehension, reading comprehension are  intact). Pt will benefit from skilled SLP intervention to improved cognitive - communication skills for safety and indpendence in ADL's   Total Evaluation Time      Total Evaluation Time (Minutes) 30  (30 speech-language eval ; 30 informal cognitive assessment)

## 2017-01-07 NOTE — PLAN OF CARE
Problem: Goal/Outcome  Goal: Goal Outcome Summary  OT: [place] completed. MIN A for transfers and varying assist for ADL at this time. Pt has some shoulder elevation and internal rotation in RUE however otherwise his arm is hemiplegic. Will continue to assess cognition however in conversation he seems WFL. Anticipate 2 weeks pending progress.

## 2017-01-07 NOTE — PROGRESS NOTES
01/07/17 0818   Living Environment   Lives With spouse  (14yr son, wife works full time but has January off)   Living Arrangements house   Home Accessibility bed and bath are not on the first floor;house is not wheelchair accessible;stairs (1 railing present);stairs (2 railings present);stairs to enter home;stairs within home   Number of Stairs to Enter Home 2  (no hand railings present)   Number of Stairs Within Home 14  (railing on R side on half of the stairs, landing after 7step)   Transportation Available family or friend will provide   Living Environment Comment [place] cabin in [place] without stairs. Current house: no bed room on main level and half bath on main level    Self-Care   Dominant Hand right   Usual Activity Tolerance good   Current Activity Tolerance fair   Regular Exercise yes   Activity/Exercise Type (Works in a mailroom)   Equipment Currently Used at Home none   Activity/Exercise/Self-Care Comment Indep prior   Functional Level Prior   Ambulation 0-->independent   Transferring 0-->independent   Toileting 0-->independent   Bathing 0-->independent   Dressing 0-->independent   Eating 0-->independent   Communication 0-->understands/communicates without difficulty   Swallowing 0-->swallows foods/liquids without difficulty   Cognition 0 - no cognition issues reported   Fall history within last six months no   Which of the above functional risks had a recent onset or change? ambulation;transferring;toileting;bathing;dressing;communication/speech   General Information   Onset of Illness/Injury or Date of Surgery - Date 12/29/16   Referring Physician Dr Abdalla   Patient/Family Goals Statement To regain strength in my R side and to speak clearly.    Pertinent History of Current Problem (include personal factors and/or comorbidities that impact the POC) Dx: L basal ganglia hemorrhagic CVA with PMH: HTN, anxiety. Pt had CVA while on vacation. Since CVA, spouse has participated in caregiver training and with  the help of family member who is a RN, was able to perform transfers for pt on/off of plane.    Precautions/Limitations fall precautions   Weight-Bearing Status - LUE full weight-bearing   Weight-Bearing Status - RUE full weight-bearing   Weight-Bearing Status - LLE full weight-bearing   Weight-Bearing Status - RLE full weight-bearing   Heart Disease Risk Factors High blood pressure;Stress;Gender;Age   General Observations Pt in supine upon entering with spouse present.    Cognitive Status Examination   Orientation orientation to person, place and time   Level of Consciousness alert   Follows Commands and Answers Questions 100% of the time   Personal Safety and Judgment intact   Cognitive Comment Slight expressive aphasia noted at times.    Pain Assessment   Patient Currently in Pain No   Integumentary/Edema   Integumentary/Edema no deficits were identifed   Posture    Posture Comments Tends to lean to the L in standing.    Range of Motion (ROM)   ROM Comment WNL except limited hamstrings to 45 deg on L.    Strength   Manual Muscle Testing Quick Adds MMT: Hip;MMT: Knee;MMT: Ankle   Strength Comments WNL for L LE   MMT: Hip, Rehab Eval   Hip Flexion - Right Side (2+/5) poor plus, right   Hip Extension - Right Side (3-/5) fair minus, right   Hip ABduction - Right Side (1+/5) trace plus, right   Hip ADduction - Right Side (2/5) poor, right   MMT: Knee, Rehab Eval   Knee Flexion - Right Side (2/5) poor, right   Knee Extension - Right Side (2+/5) poor plus, right   MMT: Ankle, Rehab Eval   Ankle Dorsiflexion - Right Side (0/5) zero, left   Ankle Plantarflexion - Right Side (0/5) zero, left   ARC Assessment Only   Acute Rehab FIM See FIM scores for Mobility/ADL Assessment   Balance   Balance other (describe)   Sitting Balance: Static good balance   Sitting Balance: Dynamic good balance   Sit-to-Stand Balance fair balance   Standing Balance: Static fair balance   Standing Balance: Dynamic poor balance   Sensory Examination    Sensory Perception Comments Impaired light sensation.  barely detectable from mid thigh and groin. Intact prioproception   Coordination   Coordination Comments Unable to assess secondary to time constraints   Muscle Tone   Muscle Tone Comments Clonus with 2-3 beats on R.    Modality Interventions   Planned Modality Interventions (FES)   General Therapy Interventions   Planned Therapy Interventions balance training;bed mobility training;gait training;groups;motor coordination training;neuromuscular re-education;orthotic fitting/training;ROM;strengthening;stretching;transfer training;wheelchair management/propulsion training;risk factor education;home program guidelines;progressive activity/exercise   Clinical Impression   Criteria for Skilled Therapeutic Intervention yes, treatment indicated   PT Diagnosis L basal ganglia hemorrhagic CVA with R sided weakness   Influenced by the following impairments Impaired balance, strength, ROM, sensation   Functional limitations due to impairments Impaired bed mobility, transfers, amb, and stairs.    Clinical Presentation Evolving/Changing   Clinical Presentation Rationale Scored 24/36 on PASS, Flaccid R UE with diminished strength on R LE, non-handicapped accessible home, impaired sesnsation   Clinical Decision Making (Complexity) Moderate complexity   Therapy Frequency` daily   Predicted Duration of Therapy Intervention (days/wks) 1/24/17   Anticipated Equipment Needs at Discharge quad cane  (QC vs hemiwalker vs w/c, possible AFO)   Anticipated Discharge Disposition Home  (HHC vs outpatient PT recommended)   Risk & Benefits of therapy have been explained Yes   Patient, Family & other staff in agreement with plan of care Yes   Clinical Impression Comments Pt presents s/p CVA with decreased sensation, strength and balance resulting in needing assistance for bed mobility, transfers, gait and stairs.  Pt will benefit from continued physical therapy to improve functional  mobility.    Total Evaluation Time   Total Evaluation Time (Minutes) 45

## 2017-01-08 PROCEDURE — 97542 WHEELCHAIR MNGMENT TRAINING: CPT | Mod: GP

## 2017-01-08 PROCEDURE — 12800006 ZZH R&B REHAB

## 2017-01-08 PROCEDURE — 25000132 ZZH RX MED GY IP 250 OP 250 PS 637: Performed by: PHYSICAL MEDICINE & REHABILITATION

## 2017-01-08 PROCEDURE — 97535 SELF CARE MNGMENT TRAINING: CPT | Mod: GO | Performed by: STUDENT IN AN ORGANIZED HEALTH CARE EDUCATION/TRAINING PROGRAM

## 2017-01-08 PROCEDURE — 40000225 ZZH STATISTIC SLP WARD VISIT: Performed by: SPEECH-LANGUAGE PATHOLOGIST

## 2017-01-08 PROCEDURE — 97532 ZZHC SP COGNITIVE SKILLS EA 15 MIN: CPT | Mod: GN | Performed by: SPEECH-LANGUAGE PATHOLOGIST

## 2017-01-08 PROCEDURE — 40000133 ZZH STATISTIC OT WARD VISIT: Performed by: STUDENT IN AN ORGANIZED HEALTH CARE EDUCATION/TRAINING PROGRAM

## 2017-01-08 PROCEDURE — 25000125 ZZHC RX 250: Performed by: PHYSICAL MEDICINE & REHABILITATION

## 2017-01-08 PROCEDURE — 97530 THERAPEUTIC ACTIVITIES: CPT | Mod: GP

## 2017-01-08 PROCEDURE — 97112 NEUROMUSCULAR REEDUCATION: CPT | Mod: GP

## 2017-01-08 PROCEDURE — 40000193 ZZH STATISTIC PT WARD VISIT

## 2017-01-08 RX ADMIN — ENOXAPARIN SODIUM 40 MG: 40 INJECTION SUBCUTANEOUS at 14:05

## 2017-01-08 RX ADMIN — LISINOPRIL 20 MG: 20 TABLET ORAL at 07:42

## 2017-01-08 NOTE — PLAN OF CARE
Problem: Goal/Outcome  Goal: Goal Outcome Summary  FOCUS/GOAL  Bowel management, Bladder management and Medication management    ASSESSMENT, INTERVENTIONS AND CONTINUING PLAN FOR GOAL:  Pt alert and oriented. Able to order dinner on his own. Right sided weakness and facial droop. Not impulsive, using call light. Family visiting, wife stated his last BM was this morning with staff and she helped him to bathroom tonight where he voided. PVR 26. Good appetite. Stand pivot transfer with 1. Alarms on. Will continue to monitor.

## 2017-01-08 NOTE — PLAN OF CARE
Problem: Goal/Outcome  Goal: Goal Outcome Summary  Outcome: Therapy, progress toward functional goals as expected  Performed caregiver training with spouse.  Spouse given ok from both PT and OT to perform stand pivot transfers with WBQC. Recommend use of gait belt during transfers with spouse as well.  Continue to recommend stand pivot transfers only and defer amb at this time due to R LE weakness with spouse notified.  During rounds, staff notified that pt continues to not be safe to sit EOB alone and continue to recommend alarms when spouse not present. Pt able to propel w/c with SBA.

## 2017-01-08 NOTE — PLAN OF CARE
Problem: Goal/Outcome  Goal: Goal Outcome Summary  Outcome: Improving  FOCUS/GOAL  Medication management, Caregiver training and Safety management    ASSESSMENT, INTERVENTIONS AND CONTINUING PLAN FOR GOAL:  Pt reported no pain during shift. Used call light to express needs. Wife cleared by therapy to assist with transfers. Continue to have alarms on, pt not safe to sit at EOB on own. Absent right hand  strength. Plan to begin MAP before discharge. Continue with POC.

## 2017-01-08 NOTE — PROGRESS NOTES
01/08/17 1511   Signing Clinician's Name / Credentials   Signing clinician's name / credentials Geneva Rodgers Ms/CCC-SLP   Quick Adds   Rehab Discipline SLP   Additional Documentation   SLP Plan SLP: continue with goals for dysarthria strategies, word retrieval, complex problem solving/reasoning and recent recall- use of memory strategies   Total Session Time   Total Session Time (minutes) 55 minutes   ARC or TCU Only   What unit is patient on? Acute Rehab   SLP - Acute Rehab Center Time   Individual Time (minutes) - enter zero if not applicable - SLP 55  (55 cogntive skills)   Group Time (minutes) - enter zero if not applicable  - SLP 0   Concurrent Time (minutes) - enter zero if not applicable  - SLP 0   Co-Treatment Time (minutes) - enter zero if not applicable  - SLP 0   ARC Total Session Time (minutes) - SLP 55   Comprehension (FIM)   Functional Performance Complete independence comprehending complex/abstract ideas   FIM Score 7- Complete independence   Expression (FIM)   Functional Performance Requires repetition to get point across (see comments);Mild difficulty expressing complex/abstract ideas   Expression Comment Occasional word retrieval difficulties and very minimal to mild dysarthria   FIM Score 6- Modified independence   Social Interaction (FIM)   Functional Performance Modified independence-only occasionally loses control   Social Interaction Comment flat affect   FIM Score 6- Modified independence   Problem Solving (FIM)   Functional Performance Needs help to solve routine problems less than 10% of the time   Problem Solving Comment completed WJ-R - Verbal Analogies- complex assessment of reasoning and mental flexibilty- pt scoring a raw score of 22 shich is a percentile rank of 53-- this is within the average range of performance on this test. Pt states that he feels he would have performed higher on this test prior to his stroke.    FIM Score 5- Supervision or Setup   Memory (FIM)    Functional Performance Minimal prompting-recognizes and remembers 75-90% of the time   Memory Comment Completed theWJ- R test - visual auditory learning to assess complex level of recall and new learning- pt scoring a raw score of 43 and percentile rank of 16-- this is a low score on this test. Pt was unsure if he would have performed higher on this test prior to his stroke vs after. Pt was not observed to use any strategies consistently to learn the new info.    FIM Score 4- Minimal contact assistance: patient expends 75% or more of effort

## 2017-01-08 NOTE — PROGRESS NOTES
"[facility] Acute [facility] Unit  Progress Note    Subjective  Feels well overall   Worked with long therapy sessions this am   Wife now cleared to assist with tranfers  Denies any needs at this time      Assessment and Plan of Care: Maldonado Mendiola is a 51 year old right hand dominant male history untreated hypertension with new left basal ganglia stroke 2/2 HTN emergency.  Profound right sided weakness, mild aphasia, right facial droop.  [place] [facility], Admitted acute inpatient rehab 01/06/2017.      Functionally:  \"Completed formal speech- language eval and informal cognitive assessment. Pt presents with minimal flaccid dysarthria which impacts some of his articulatory precision and rate and rhythm with speaking. Pt also demonstrates some mild word retrieval difficulties and minimal to mild reduction in verbal fluency - however pt is able to express himself independently. Pt demonstrates intact attention, but mildly impaired recent verbal memory for more lengthier info. Pt also demonstrates some mild deficit in verbal reasoning/ mental flexibility. All other areas of language ( auditory comprehension, reading comprehension are  intact). Pt will benefit from skilled SLP intervention to improved cognitive - communication skills for safety and indpendence in ADL's.    Pt is currently on a regular diet with thin liquids and reportedly is tolerating without difficulty. During speech-lang eval- screened swallowing an no difficulties were noted- so not needing a formal swallow eval at this time. \"  \"Performed eval.  Scored 24/36 on PASS.  Pt requires min A x 1 for stand pivot transfers with hemiwalker.  Anticipate LOS 2-3 weeks with recommending [facility] PT vs outpatient PT upon discharge.  Pt's current home is not handicapped accessible but does have [place] in [place] that is if needed.  Spouse present throughout both sessions and is very involved with pt's " "care. \"  PASS 24 / 36 with PT  Continue therapies and plan of care.      Overall Management:  - manage HTN, on lisinopril and prn hydralazine  - on lovenox for DVT ppx (benefit outweigh risk ? Was using at [place] [facility] and transitioned to here - on admit documented there was a discussion between admission team and neurology team in [place] that dvt ppx not contrindicated)  Cont ongoing management and plan of care, early in course, track progress and needs         Active Diet Order  Regular Diet Adult    Labs  None today     Medications:  Current Facility-Administered Medications   Medication     lisinopril (PRINIVIL/ZESTRIL) tablet 20 mg     polyethylene glycol (MIRALAX/GLYCOLAX) Packet 17 g     enoxaparin (LOVENOX) injection 40 mg     senna-docusate (SENOKOT-S;PERICOLACE) 8.6-50 MG per tablet 2 tablet     acetaminophen (TYLENOL) tablet 650 mg     melatonin tablet 3 mg     hydrALAZINE (APRESOLINE) tablet 10 mg         Physical Examination:   /74 mmHg  Pulse 62  Temp(Src) 96.9  F (36.1  C) (Oral)  Resp 16  Ht 1.829 m (6')  Wt 78.835 kg (173 lb 12.8 oz)  BMI 23.57 kg/m2  SpO2 95%  In bed, supine  Conversant and pleasant   Unlabored breathing   R UE flaccid, no sig voluntary movement, neg leong's  R LE 3+/5 hip flex, knee ext with limited range, no sig ankle DF / PF  Clonus R ankle 4 beats        More than 15 minutes spent with at least half on care coordination         "

## 2017-01-08 NOTE — PLAN OF CARE
Problem: Goal/Outcome  Goal: Goal Outcome Summary  OT: Shower assessment completed. Pt is SBA for shower seated and assist of 1 for transfer. Transfer training completed with the pt and his wife and pt's wife has been approved to do Bed<>Wc and WC<>toilet transfer. When wife is not present the alarms should be put on and pt was instructed to call nursing.

## 2017-01-08 NOTE — PLAN OF CARE
Problem: Goal/Outcome  Goal: Goal Outcome Summary  Outcome: No Change  FOCUS/GOAL  Bowel management, Bladder management and Mobility    ASSESSMENT, INTERVENTIONS AND CONTINUING PLAN FOR GOAL:  Pt up at change of shift to use the BR. Pt was able to pivot transfer to W/C and toilet with assistance of 1 staff. Pt had large loose stool on toilet, and needed staff to manage rectal hygiene for him. Pt has been sleeping well since then and is able to reposition himself in bed. Pt uses his call light appropiately, and is able to direct his cares.

## 2017-01-09 PROCEDURE — 40000225 ZZH STATISTIC SLP WARD VISIT: Performed by: SPEECH-LANGUAGE PATHOLOGIST

## 2017-01-09 PROCEDURE — 97112 NEUROMUSCULAR REEDUCATION: CPT | Mod: GO | Performed by: STUDENT IN AN ORGANIZED HEALTH CARE EDUCATION/TRAINING PROGRAM

## 2017-01-09 PROCEDURE — 40000193 ZZH STATISTIC PT WARD VISIT

## 2017-01-09 PROCEDURE — 12800006 ZZH R&B REHAB

## 2017-01-09 PROCEDURE — 25000132 ZZH RX MED GY IP 250 OP 250 PS 637: Performed by: PHYSICAL MEDICINE & REHABILITATION

## 2017-01-09 PROCEDURE — 97532 ZZHC SP COGNITIVE SKILLS EA 15 MIN: CPT | Mod: GN | Performed by: SPEECH-LANGUAGE PATHOLOGIST

## 2017-01-09 PROCEDURE — 97116 GAIT TRAINING THERAPY: CPT | Mod: GP

## 2017-01-09 PROCEDURE — 40000133 ZZH STATISTIC OT WARD VISIT: Performed by: STUDENT IN AN ORGANIZED HEALTH CARE EDUCATION/TRAINING PROGRAM

## 2017-01-09 PROCEDURE — 25000125 ZZHC RX 250: Performed by: PHYSICAL MEDICINE & REHABILITATION

## 2017-01-09 RX ADMIN — ENOXAPARIN SODIUM 40 MG: 40 INJECTION SUBCUTANEOUS at 16:09

## 2017-01-09 RX ADMIN — LISINOPRIL 20 MG: 20 TABLET ORAL at 09:00

## 2017-01-09 NOTE — PROGRESS NOTES
[facility]   Acute Rehabilitation Unit  Daily progress note    interval history  Maldonado Mendiola was seen and examined at bedside. No acute events. No complaints this am. Feels right LE getting stronger.  Voiding well.        medications    lisinopril  20 mg Oral Daily     enoxaparin  40 mg Subcutaneous Q24H     senna-docusate  2 tablet Oral At Bedtime        polyethylene glycol, acetaminophen, melatonin, hydrALAZINE     physical exam  /70 mmHg  Pulse 60  Temp(Src) 97.8  F (36.6  C) (Oral)  Resp 16  Ht 1.829 m (6')  Wt 78.835 kg (173 lb 12.8 oz)  BMI 23.57 kg/m2  SpO2 97%  HEENT: AT/NC, PERRL  Pulmonary: CTA b/l, no wheezing, rhonchi, rales  Cardiovascular: S1/S2, 3/5 systolic ejection murmer heard best apex  Abdominal: Pos bowel sounds, soft, non tender, non distended  Extremities: moving all extremities, no edema, good pulses  MSK/neuro: 3/5 hip flexion, dorsiflexion, knee flexion right    labs  Reviewed.     assessment and plan  Maldonado Mendiola is a 51 year old right hand dominant male history untreated hypertension with new left basal ganglia stroke 2/2 HTN emergency.  Profound right sided weakness, mild aphasia, right facial droop.  [place] [facility], Admitted acute inpatient rehab 01/06/2017.        1.  Rehabilitation: begin acute inpatient rehabilitation from a multidisciplinary approach.  PT, OT and SLP  For total 3 hours daily, in addition to rehab nursing and close management of physiatrist.      2.  Left basal ganglia stroke: profound right sided weakness UE/LE, aphasia.                -BP control, goal normotensive                -continue lisinopril 20mg daily              -prn hydralazine if needed              -rehab plan above              -f/u neurology / stroke service locally (needs new pt visit or consult)    3.  Hypertension;  Recently treated for HTN emergency s/p IP,  Has been stable on lisinipril 20mg daily which is  continued.  Given history we've added prn hydralazine for BP >150/95.  Consider IM consult if becomes diff to control.      4.  Heart murmer: 3/5 systolic ejection murmer best heard at apex. Pt states informed about this during his acute hospital stay this week but before that was unaware.  Appears stable without signs of heart failure.  Denies CP, SOB, swelling. Given history including HTN emergency feel may warrant workup. EKG reportedly normal per chart. Consider echocardiogram. IM/cards f/u if appropriate.     5.  Risk of electrolyte abnormality: f/u admission BMP    6.  Risk of anemia: f/u admission CBC     7.  F/E/N:  Appears well hydrated, f/u BMP, tolerating regular diet / thins continued, f/u SLP eval.     Additional rehab concerns:   Adjustment to disability:  Clinical psychology to eval and treat if appropriate  FEN: well hydrated, tolerating regular diet, nutrition consult if appropriate.  Bowel: bowel program if deemed necessary   Bladder: PVRs and/or bladder program if deemed necessary  DVT Prophylaxis: chemical with Lovenox daily.  Significantly impaired mobility.    GI Prophylaxis: PPI or BID zantac if appropriate  Code: Full code   Disposition: Home  ELOS:  3-4 weeks   Rehab prognosis:  Fair - Good   Follow up Appointments / Discharge planning:   F/u with PCP  Establish care with neurology/stroke provider locally  Consider evaluation of heart murmur     Making progress in therapies over the weekend, wife assisting with transfers, right LE strength improving.  Still profound weakness right UE.  Appears medically stable.  Consider echo to eval murmer (see above), appropriate for acute rehab therapies continued.     Patient seen and discussed with Dr. Beauchamp,  PM&R Staff Physician    Rashad Barrientos, DO  PM&R, PGY2

## 2017-01-09 NOTE — PLAN OF CARE
Problem: Goal/Outcome  Goal: Goal Outcome Summary  Outcome: No Change  Patient  has urinal @ bedside,voiding tea colored urine,encouraged patient to drink more water.Denies pain.Right leg weakness,assist of 1 and he pivots to wheel chair.Spouse present and assists patient with cares.Pleasant,continue with POC.

## 2017-01-09 NOTE — PLAN OF CARE
Problem: Goal/Outcome  Goal: Goal Outcome Summary  Outcome: No Change  FOCUS/GOAL  Bladder management and Mobility    ASSESSMENT, INTERVENTIONS AND CONTINUING PLAN FOR GOAL:  Pt sleeps well through the night, and is able to reposition himself in bed. Pt uses bedside urinal independently and is continent of urine. Staff empties urinal for pt. Bed alarm in use for safety of pt.

## 2017-01-09 NOTE — PLAN OF CARE
Problem: Goal/Outcome  Goal: Goal Outcome Summary  FOCUS/GOAL  Bowel management, Bladder management and Medication management    ASSESSMENT, INTERVENTIONS AND CONTINUING PLAN FOR GOAL:  Pt alert, using call light for needs. Spouse and child visiting this evening. Uses urinal independently. Discussed senna and loose stool last night, he refused senna. Skin intact, educated on importance of repositioning. Laying on left side with PCD. Denies pain. Will continue to monitor.

## 2017-01-09 NOTE — PLAN OF CARE
Problem: Goal/Outcome  Goal: Goal Outcome Summary  Reivewed word retreival strategies with pt gave handout as well as dysarthria stratieges. Practiced during conversation about pt's band- pt only had a couple of instances of mild articulatory imprecsions that he was able to self- correct by restarting again.Occasional mild word fidning difficulties- but pt was able to substiitue words to effectively communicate his message.Completed a complex deducitve reasoning pzzle- needing increased time and occasaional cueing to aid in divided attentiond emands of task. Completed word pyramid for both word finding and also for problem solving- pt scoring 100% on both independently.Reviewed memory strategy sheet with pt and gave him the handout-- pt was able to complete a memroy recall task- recall of 5 words and then answering a question about 1 of the words that pt heard read through 1x-- pt scoring 7/8 -- was using strategy of rehearsal and mental imagry.

## 2017-01-09 NOTE — PLAN OF CARE
Problem: Goal/Outcome  Goal: Goal Outcome Summary  Skilled set-up on the : patient dependent for proper placement of electrodes on RUE for optimum muscle contraction, safe positioning in WC and alignment of patient to  ergometer. Passive motion was assessed to ensure proper positioning and joint integrity during cycling. Determined appropriate FES parameters for each muscle group based off of strong tetanic response of muscle test.     Occupational Therapy treatment session with use of the FES UE Ergometer :   Session # 1  Passive motion assessed to ensure proper positioning. Purpose of use of  with this pt is to achieve improvement in RUE muscle grading w/ repeated movement w/ bio feedback of screen and support of functional estim for muscle engagement w/ programmed muscle pattern facilitation.    Good muscle contraction was observed in all muscle groups (please refer to [email or web address] for stimulation parameters). OT adjusted e-stim and cycling parameters in real-time to ensure appropriate targeted muscle contraction/response throughout session. Patient tolerated cycling well with no adverse response noted.     For full cycling report, please refer to [email or web address] [email or web address], patient ID [id number] and pin [id number].

## 2017-01-09 NOTE — PROVIDER NOTIFICATION
"   17   Living Arrangements   Lives With child(frida), dependent;spouse   Living Arrangements house   Home Safety   Patient Feels Safe Living in Home? yes     Social Work: Initial Assessment with Discharge Plan    Patient Name: Maldonado WREN \"Mac\" Maury  : 1965  Age: 51 year old  MRN: [id number]  Completed assessment with: patient  Admitted to ARU: 17    Presenting Information   Date of SW assessment: 16  Health Care Directive: none  Primary Health Care Agent: next-of-kin would be surrogate decision-maker  Secondary Health Care Agent: n/a  Living Situation: with wife and 14 yr old son in house with 2 step entry & 14 steps inside  Previous Functional Status: independent previously  DME available: none  Patient and family understanding of hospitalization: stroke  Cultural/Language/Spiritual Considerations: speaks english    Physical Health  Reason for admission: left basal ganglia hemorrhagic CVA with right sided weakness    Provider Information   Primary Care Physician: Edmond Cano PA-C [phone]    Mental Health/Chemical Dependency:   Diagnosis: h/o anxiety/panic attacks  Alcohol/Tobacco/Narcotics: social drinker, 6 pack of cigarettes weekly  Support/Services in Place: none  Services Needed/Recommended: none  Sexuality/Intimacy: heterosexual    Support System  Marital Status: wife-Shannan (off work for the month of January)  Family support: 20 yr old stepdaughter, 14 yr old son  Other support available: extended family    Community Resources  Current in home services: none  Previous services: none    Financial/Employment/Education  Employment Status: works at [employer] in   Income Source: wages/wife's wages  Education: 2 yrs of college  Financial Concerns: none  Insurance: BCBS    Discharge Plan   Patient and family discharge goal: return home with family & receive appropriate therapy services  Provided education on discharge plan: home therapy/outpatient therapy  Patient " agreeable to discharge plan: to be determined  Provided education and attained signature for Medicare IM and IRF Patient Rights and Privacy Information provided to patient : n/a  Provided patient with resources for [place] Stroke Association: declined  Barriers to discharge: none identified    Discharge Recommendations   Disposition: return home with family and receive therapy services  Transportation Needs: wife  Name of Transportation Company and Phone: n/a    Please invite to Care Conference:  Shannan (wife) cell [phone] or [phone]      ANN Ballesteros   Phone: [phone]  Pager: [phone]

## 2017-01-10 PROCEDURE — 97532 ZZHC SP COGNITIVE SKILLS EA 15 MIN: CPT | Mod: GN | Performed by: SPEECH-LANGUAGE PATHOLOGIST

## 2017-01-10 PROCEDURE — 12800006 ZZH R&B REHAB

## 2017-01-10 PROCEDURE — 40000193 ZZH STATISTIC PT WARD VISIT

## 2017-01-10 PROCEDURE — 97112 NEUROMUSCULAR REEDUCATION: CPT | Mod: GP

## 2017-01-10 PROCEDURE — 97530 THERAPEUTIC ACTIVITIES: CPT | Mod: GP

## 2017-01-10 PROCEDURE — 40000133 ZZH STATISTIC OT WARD VISIT: Performed by: STUDENT IN AN ORGANIZED HEALTH CARE EDUCATION/TRAINING PROGRAM

## 2017-01-10 PROCEDURE — 25000132 ZZH RX MED GY IP 250 OP 250 PS 637: Performed by: PHYSICAL MEDICINE & REHABILITATION

## 2017-01-10 PROCEDURE — 40000225 ZZH STATISTIC SLP WARD VISIT

## 2017-01-10 PROCEDURE — 40000225 ZZH STATISTIC SLP WARD VISIT: Performed by: SPEECH-LANGUAGE PATHOLOGIST

## 2017-01-10 PROCEDURE — 97532 ZZHC SP COGNITIVE SKILLS EA 15 MIN: CPT | Mod: GN

## 2017-01-10 PROCEDURE — 97535 SELF CARE MNGMENT TRAINING: CPT | Mod: GO | Performed by: STUDENT IN AN ORGANIZED HEALTH CARE EDUCATION/TRAINING PROGRAM

## 2017-01-10 RX ORDER — AMOXICILLIN 250 MG
1-2 CAPSULE ORAL DAILY PRN
Status: DISCONTINUED | OUTPATIENT
Start: 2017-01-10 | End: 2017-01-24 | Stop reason: HOSPADM

## 2017-01-10 RX ADMIN — LISINOPRIL 20 MG: 20 TABLET ORAL at 07:43

## 2017-01-10 NOTE — PLAN OF CARE
Problem: Goal/Outcome  Goal: Goal Outcome Summary  Pt with very minimal to mild dysarhtria- reivewed dysarthria strategies again; completed verbal fluency- divergent naming task for verbal flucney- pt was able to meet his target of naming 10 itmes in a category in 1 mins time on 4 out of 5 attempts. This task was more difficult for him during testing bu improved today. Pt however is independent in commuicating complex thoughts but with mild word retrieval difficulties. Completed complex task on I-pad that targeted divided attention, pre-planning, complex problem solving and speed- TRain of though- pt initially needing increased time to complete but improved in accuracy and speed as task progressed with last trial- pt scoring 100%

## 2017-01-10 NOTE — PROGRESS NOTES
[facility]   Acute Rehabilitation Unit  Daily progress note    interval history  Maldonado Mendiola was seen and examined at bedside. No acute events. No complaints this am. Feels right LE getting stronger.  Voiding well.        medications    lisinopril  20 mg Oral Daily     senna-docusate  2 tablet Oral At Bedtime        polyethylene glycol, acetaminophen, melatonin, hydrALAZINE     physical exam  /56 mmHg  Pulse 76  Temp(Src) 96.1  F (35.6  C) (Oral)  Resp 16  Ht 1.829 m (6')  Wt 78.835 kg (173 lb 12.8 oz)  BMI 23.57 kg/m2  SpO2 97%  HEENT: AT/NC, PERRL  Pulmonary: CTA b/l, no wheezing, rhonchi, rales  Cardiovascular: S1/S2, 3/5 systolic ejection murmer heard best apex  Abdominal: Pos bowel sounds, soft, non tender, non distended  Extremities: moving all extremities, no edema, good pulses  MSK/neuro: 3/5 hip flexion, dorsiflexion, knee flexion right    labs  Reviewed.     assessment and plan  Maldonado Mendiola is a 51 year old right hand dominant male history untreated hypertension with new left basal ganglia stroke 2/2 HTN emergency.  Profound right sided weakness, mild aphasia, right facial droop.  [place], Admitted acute inpatient rehab 01/06/2017.        1.  Rehabilitation: begin acute inpatient rehabilitation from a multidisciplinary approach.  PT, OT and SLP  For total 3 hours daily, in addition to rehab nursing and close management of physiatrist.      2.  Left basal ganglia stroke: profound right sided weakness UE/LE, aphasia.                -BP control, goal normotensive                -continue lisinopril 20mg daily              -prn hydralazine if needed              -rehab plan above              -f/u neurology / stroke service locally (needs new pt visit or consult)    3.  Hypertension;  Recently treated for HTN emergency s/p IPH,  Has been stable on lisinipril 20mg daily which is continued.  Given history we've added prn  hydralazine for BP >150/95.  Consider IM consult if becomes diff to control.      4.  Heart murmer: 3/5 systolic ejection murmer best heard at apex. Pt states informed about this during his acute hospital stay this week but before that was unaware.  Appears stable without signs of heart failure.  Denies CP, SOB, swelling. Given history including HTN emergency feel may warrant workup. EKG reportedly normal per chart. Consider echocardiogram. IM/cards f/u if appropriate.     5.  Risk of electrolyte abnormality: f/u admission BMP    6.  Risk of anemia: f/u admission CBC     7.  F/E/N:  Appears well hydrated, f/u BMP, tolerating regular diet / thins continued, f/u SLP eval.     Additional rehab concerns:   Adjustment to disability:  Clinical psychology to eval and treat if appropriate  FEN: well hydrated, tolerating regular diet, nutrition consult if appropriate.  Bowel: bowel program if deemed necessary   Bladder: PVRs and/or bladder program if deemed necessary  DVT Prophylaxis: chemical with Lovenox daily.  Significantly impaired mobility.    GI Prophylaxis: PPI or BID zantac if appropriate  Code: Full code   Disposition: Home  ELOS:  3-4 weeks   Rehab prognosis:  Fair - Good   Follow up Appointments / Discharge planning:   F/u with PCP  Establish care with neurology/stroke provider locally  Consider evaluation of heart murmur     Making progress in therapies over the weekend, wife assisting with transfers, right LE strength improving.  Still profound weakness right UE.  Appears medically stable.  Consider echo to eval murmer (see above), appropriate for acute rehab therapies continued.     Patient seen and discussed with Dr. Beauchamp,  PM&R Staff Physician    Rashad Barrientos, DO  PM&R, PGY2    I, Dr. Beauchamp, also saw and examined Mac.   My anne decisions and exam participating well. Remaining motivated.  Sleeping better.  Further discussion on tone thus far he's not developing any on right.  I have reviewed and edited  the above resident note and agree.

## 2017-01-10 NOTE — PLAN OF CARE
Acute Rehab Care Conference/Team Rounds      Type: Team Rounds    Present: Dr. Miguel Beauchamp, Sinai Prasad SW, Patricia Ramos OT, Iain Brown PT, Geneva Rodgers SLP,  Migdalia Small , Sergey Jamison, Diana Oliva Dietician, Asaf ALVAREZ       Discharge Barriers/Treatment/Education    Rehab Diagnosis: stroke    Active Medical Co-morbidities/Prognosis: hypertension    Safety: Bed alarm activated    Pain:Denies pain, tylenol on board    Medications, Skin, Tubes/Lines:Take meds whole with oversight, have'nt participated in MAP yet, but will before discharge, skin is intact    Swallowing/Nutrition: Pt is tolerating a regular diet with thin liquids without difficulty    Bowel/Bladder: Continent of bowel and bladder, use the commode in his room, ind with using the urinal and staff empties it at night., LMB 1/9, on senna at     Psychosocial: Pt resides with his wife and their dependent son. Goal to return home with continued support. Team working towards a safe d/c plan.    ADLs/IADLs: Pt is MIN A for UB cares and MOD A for LB cares. Pt is MIN A for transfers and his wife has been trained to provide the assist when she is here. Pt has significant deficit in his RUE and is thus getting used to using his LUE for ADL. We are working on improving I with self cares and transfers, RUE neuro re ed and increasing safety with standing. Pt's home is not WC accessible, he would not be sofia to bring a WC into the bathroom that is on the main level and thus needs to be somewhat ambulatory at [place]. Continue with POC.     Mobility: still early but showing some leg movement. Not sure capacity to ambulate by acute rehab discharge. Still working on pre gait training, transfers, sitting and standing balance.    Cognition/Language:Pt has very minimal to mild impairments with speech- dysarthria and high level verbal expression- word finding. With cognition- pt has more mild to mild- moderate deficits with recent  memory and complex level reasoning/problem solving. His scores on the complex test of new learning- WJ-R Visual auditory learning were 16th percentile ( a low score on this test) and with the complex reasoning test: verbal analogies- pt scored in the 53rd percentile ( average performance) but he feels he would have scored much higher on this test prior to his stroke. Have focused tx on learning compensatory memory strategies and word retrieval strategies, dysarthria strategies and complex tasks targeting memory, divided attention, complex reasoning, processing time-- pt was much improved today with his performance on 2 complex I-pad tasks targeting these areas. Pt would benefit form initial oversight for med management and finances initially upon d/c to home and would further recommend continued OP sptx    Community Re-Entry:    Transportation:    Decision maker: self    Plan of Care and goals reviewed and updated.    Discharge Plan/Recommendations    Fall Precautions: continue    Overall plan for the patient: highly motivated and benefiting from intensity. Some emerging leg movements need to capitalize on this. Not clear if he'll be wheelchair based mobility at d/c. If further emerging movements it may extend stay 3+ weeks. Cognition / language deficits though won't be barrier for d/c.       Utilization Review and Continued Stay Justification    Medical Necessity Criteria:    For any criteria that is not met, please document reason and plan for discharge, transfer, or modification of plan of care to address.    Requires intensive rehabilitation program to treat functional deficits?: Yes    Requires 3x per week or greater involvement of rehabilitation physician to oversee rehabilitation program?: Yes    Requires rehabilitation nursing interventions?: Yes    Patient is making functional progress?: Yes    There is a potential for additional functional progress? Yes    Patient is participating in therapy 3 hours per  day a minimum of 5 days per week or 15 hours per week in 7 day period?:Yes    Has discharge needs that require coordinated discharge planning approach?:Yes        Final Physician Sign off    Statement of Approval: I agree with all the recommendations detailed in this document.      Patient Goals  1. Patient Goal: Social Work Focus: Patient will be involved in discharge planning.  2. Patient Goal: Social Work Focus: Patient will receive appropriate services and resources.       OT goal: hygiene/grooming: independent  OT goal: upper body dressing: Independent  OT goal: lower body dressing: Independent  OT goal: upper body bathing: Supervision/stand-by assist  OT goal: lower body bathing: Supervision/stand-by assist  OT Goal: transfer: Modified independent, with assistive device (walk in shower transfer)  OT goal: toilet transfer/toileting: Modified independent, toilet transfer, cleaning and garment management  OT goal: meal preparation: Modified independent, with simple meal preparation  OT goal: cognitive: Patient/caregiver will verbalize understanding of cognitive assessment results/recommendations as needed for safe discharge planning  OT goal 1: I with RUE HEP to improve strength and coordination for improving function for ADL/IADL and functional mobility  OT goal 2: Pt will independently manage and position his RUE during functional mobility and ADL/IADL in terms of positoning and protecting from injury.   OT/ROGER face-to-face collaboration occurred, patient progress and plan of care reviewed.    PT target date for goal attainment: 01/24/17  PT Frequency: Daily x 60 min  PT goal: bed mobility: Independent  PT goal: transfers: Modified independent, Assistive device  PT goal: gait: Modified independent, 150 feet, Quad cane  PT goal: stairs: Modified independent, Greater than 10 stairs, Rail on left, Rail on both sides, Assistive device  PT goal: perform aerobic activity with stable cardiovascular response: 10  minutes, continuous activity     PT goal 1: Pt will attend falls prevention class  PT Goal 2: Pt will be indep with LE strengthening HEp  PT Goal 3: Pt will be educated on how to perform appropriate floor transfer  PT Goal 4: Pt will be SBA for car transfer         SLP target date for goal attainment: 01/20/17  SLP Frequency: daily  SLP goal 1: Pt will independently utilize dysarthria strategies to improve speech intelligibility in connected speech to 95% accuracy  SLP goal 2: Pt will independently utilize word retrieval strategies to improve verbal fluency/word retrieval to 90% accuracy in complex verbal expression tasks and convesation  SLP goal 3: Pt will independently utilized compensatory memory strategies to increase recent recall during complex tasks with 90% accuracy  SLP goal 4: Pt will complete complex problem solving and reasoning tasks with 90% accuracy       Patient/Family Goal: Medication Management: Pt will demonstrate ability to administer safe medication administration at home by participating with the MAP  before discharge      Goal: Safety Management: Pt will demonstrate safety awareness by using the callight at all times to ask for assistance with transfer until he is declered independent by the therapist

## 2017-01-10 NOTE — PLAN OF CARE
Problem: Goal/Outcome  Goal: Goal Outcome Summary  Outcome: Improving  FOCUS/GOAL  Medical management    ASSESSMENT, INTERVENTIONS AND CONTINUING PLAN FOR GOAL:  Pt denied any pain today, denied any dizziness, Vss, rounds done and possible d/c to home at the end of the month, and he could be a good candidate for MAP in two weeks from now. Pt doesn't call that much, urinal used and staff emptying it for him.  Nursing will cont POC

## 2017-01-10 NOTE — PLAN OF CARE
Problem: Goal/Outcome  Goal: Goal Outcome Summary  Outcome: No Change  Patient declined senna @ HS.Spouse brought food from home and shared with patient @ supper with their son.Denies pain.Pleasant.

## 2017-01-10 NOTE — PROGRESS NOTES
"   01/10/17 1500   Visit Information   Visit Made By Staff    Type of Visit Initial;Staff consultation/triage   Visited Patient   Interventions   Plan of Care Review   With patient/family/proxy;With interdisciplinary team   Basic Spiritual Interventions    introduction/orientation to Spiritual Health Services;Assessment of spiritual needs/resources;Reflective conversation;Life Review   Advanced Assessments/Interventions   Presenting Concerns/Issues Spiritual/Anabaptist/emotional support;Grief and adjustment issues;Self-concept disturbance;Challenged coping;Stress/self-care   SPIRITUAL HEALTH SERVICES  SPIRITUAL  ASSESSMENT Progress Note  [facility] [place] [place]    PRIMARY FOCUS    Goals of Care    Emotional distress    Support for coping    ILLNESS CIRCUMSTANCES:   Reviewed documentation. Responded to referral based on information heard in team rounds.  Reflective conversation shared with Maldonado which integrated elements of illness and family narratives.    Context of Serious Illness/Sympton(s)- Mac said that he plans now to take his high blood pressure medicine as he sees how necessary it is. (Before this, he took sometimes and not other times.)  His right arm is not working, and the most important source of life energy for Mac is music.  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  \"I listen to hard rock and roll, but what comes out of me naturally is more gentle than that.\"  He has a step dtr who is 21yo and a son who is 13 yo, \"and who is great\". Gifty and Mac have bought a house in [place] that they are fixing up and plan to move into eventually. \"Things are quieter there and more natural.\"    Resources for Support - His wife, Gifty, who likes to sing [employer].    DISTRESS:     Emotional, Spiritual, Existential Distress - Music is Mac's Mandaeism. He is working to be patient with his diabilities.    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  His wife works in [employer] and makes good money.    SPIRITUAL/Mormonism COPING):     Holiness/Sophy - Gifty attends the [employer], which she quite likes. Mac goes some of the time, but music is it for him.    Spiritual Practice(s) - Music. Offered a song/chant for Mac.    Emotional, Relational,Existential Connections- Music, Gifty, his kids.  Friends and community in [place].     GOALS OF CARE:    Goals of Care - To be able to play music and go home.    Meaning/Sense-Making- n/d    PLAN: Will see Mac on Friday on ARU.    Rev. Luan-O Alex-Madelyn  Staff   [phone]

## 2017-01-10 NOTE — PLAN OF CARE
Problem: Goal/Outcome  Goal: Goal Outcome Summary  Progressing well with:  Amb - 100'x2 in Liko rail with WBQC at maxAx2-3.    Need to focus on improving isolated R hip control in standing and R hip strength to improve ease of SPTs and amb.

## 2017-01-11 PROCEDURE — 97530 THERAPEUTIC ACTIVITIES: CPT | Mod: GO

## 2017-01-11 PROCEDURE — 97110 THERAPEUTIC EXERCISES: CPT | Mod: GO

## 2017-01-11 PROCEDURE — 97530 THERAPEUTIC ACTIVITIES: CPT | Mod: GP

## 2017-01-11 PROCEDURE — 97532 ZZHC SP COGNITIVE SKILLS EA 15 MIN: CPT | Mod: GN | Performed by: SPEECH-LANGUAGE PATHOLOGIST

## 2017-01-11 PROCEDURE — 40000193 ZZH STATISTIC PT WARD VISIT: Performed by: PHYSICAL THERAPIST

## 2017-01-11 PROCEDURE — 92507 TX SP LANG VOICE COMM INDIV: CPT | Mod: GN | Performed by: SPEECH-LANGUAGE PATHOLOGIST

## 2017-01-11 PROCEDURE — 40000225 ZZH STATISTIC SLP WARD VISIT: Performed by: SPEECH-LANGUAGE PATHOLOGIST

## 2017-01-11 PROCEDURE — 97150 GROUP THERAPEUTIC PROCEDURES: CPT | Mod: GO | Performed by: OCCUPATIONAL THERAPIST

## 2017-01-11 PROCEDURE — 40000133 ZZH STATISTIC OT WARD VISIT: Performed by: OCCUPATIONAL THERAPIST

## 2017-01-11 PROCEDURE — 97112 NEUROMUSCULAR REEDUCATION: CPT | Mod: GP | Performed by: PHYSICAL THERAPIST

## 2017-01-11 PROCEDURE — 25000132 ZZH RX MED GY IP 250 OP 250 PS 637: Performed by: PHYSICAL MEDICINE & REHABILITATION

## 2017-01-11 PROCEDURE — 12800006 ZZH R&B REHAB

## 2017-01-11 PROCEDURE — 97116 GAIT TRAINING THERAPY: CPT | Mod: GP

## 2017-01-11 PROCEDURE — 40000133 ZZH STATISTIC OT WARD VISIT

## 2017-01-11 PROCEDURE — 40000193 ZZH STATISTIC PT WARD VISIT

## 2017-01-11 RX ADMIN — LISINOPRIL 20 MG: 20 TABLET ORAL at 09:03

## 2017-01-11 NOTE — PLAN OF CARE
Problem: Goal/Outcome  Goal: Goal Outcome Summary  Outcome: Improving  SLP:assessed pt's ability to write with non dominant hand - legible printing, but slow.  Also worked with IPAD - pt was able to type out messages/navigate steps for generating e-mail. Voice recognition may be most effective - pt needing only few cues to utilize correctly.

## 2017-01-11 NOTE — PLAN OF CARE
Problem: Goal/Outcome  Goal: Goal Outcome Summary  Patient is alert and oriented  X 4. Denies any pain. Continent of bladder. Using urinal  Independently. Staff assisting to empty urinal. Assist of 1  For transfers. S.o at bedside and assisting pt as needed. Will continue with poc.

## 2017-01-11 NOTE — PROGRESS NOTES
PM&R Daily Note:    Formal team rounds held today, please see separate document in Plan of Care tab for full details. Briefly highly motivated and benefiting from intensity. Some emerging leg movements need to capitalize on this. Not clear if he'll be wheelchair based mobility at d/c. If further emerging movements it may extend stay 3+ weeks. Cognition / language deficits though won't be barrier for d/c.     Blood pressures some on lower side but not symptomatic. For now continue with lisinopril 20 mg daily but may need to reduce. Hold parameter at SBP < 100.  With some mobilization and relative risk increase of hemorrhagic stroke, will continue off enoxaparin for now and use pneumos and mobilization.    Filed Vitals:    01/09/17 1733 01/10/17 0742 01/10/17 0924 01/10/17 1537   BP: 100/64 115/67 104/56 111/67   Pulse: 60 73 76 82   Temp: 97.6  F (36.4  C) 96.1  F (35.6  C)  97.3  F (36.3  C)   TempSrc: Oral Oral  Oral   Resp: 16 16  16   Height:       Weight:       SpO2: 95%  97% 98%     Alert, conversant  Mild dysarthria with facial weakness but well understood  Similar left hemiparesis   Not showing tone  Euthymic.  Coordination of care:  20 minutes  Face-to-face:              10 minutes  Total time:                   30 minutes

## 2017-01-11 NOTE — PLAN OF CARE
"Problem: Goal/Outcome  Goal: Goal Outcome Summary  OT: Pt participated in the established \"Transitions Group\" for stroke pts, spouse also present. Highlighting topics such as return to meaningful activities, health/wellness, fatigue, depression/anxiety, bowel/bladder considerations w/ community re-integration and caregiver wellness/support. Pt and spouse very receptive to class. Pt reports personal goals after discharge are to return to playing music.        "

## 2017-01-11 NOTE — PLAN OF CARE
Problem: Goal/Outcome  Goal: Goal Outcome Summary  Outcome: No Change  FOCUS/GOAL  Bladder management and Medical management    ASSESSMENT, INTERVENTIONS AND CONTINUING PLAN FOR GOAL:  Pt denies pain, chest pain, or SOB. BP stable.   Has severe weakness on RUE and RLE but able to use urinal within reach, staff emptied urinal.   Wife assisted pt for transfer and dressing.   PCD on, bed alarm on for safety.

## 2017-01-11 NOTE — PLAN OF CARE
Problem: Goal/Outcome  Goal: Goal Outcome Summary  Outcome: No Change  Alert and oriented to self, place and time. Ind with using the urinal and staff empties it, repositioning in bed ind,  slept most of the night, will continue POC.

## 2017-01-11 NOTE — PLAN OF CARE
Problem: Goal/Outcome  Goal: Goal Outcome Summary  Outcome: Therapy, progress toward functional goals as expected  PT: Pt. Able to transfer from EOB-W/C with CGA following set up by therapist, and able to transfer from prone-quadriped with min-A to stabilize right hip and right hand. Continue to work on gait training and weight bearing activities in quadriped/tall kneeling to promote right hip and scapular muscle activation.

## 2017-01-12 PROCEDURE — 97532 ZZHC SP COGNITIVE SKILLS EA 15 MIN: CPT | Mod: GN

## 2017-01-12 PROCEDURE — 40000193 ZZH STATISTIC PT WARD VISIT

## 2017-01-12 PROCEDURE — 97110 THERAPEUTIC EXERCISES: CPT | Mod: GP | Performed by: PHYSICAL THERAPIST

## 2017-01-12 PROCEDURE — 97112 NEUROMUSCULAR REEDUCATION: CPT | Mod: GO | Performed by: STUDENT IN AN ORGANIZED HEALTH CARE EDUCATION/TRAINING PROGRAM

## 2017-01-12 PROCEDURE — 25000132 ZZH RX MED GY IP 250 OP 250 PS 637: Performed by: PHYSICAL MEDICINE & REHABILITATION

## 2017-01-12 PROCEDURE — 40000193 ZZH STATISTIC PT WARD VISIT: Performed by: PHYSICAL THERAPIST

## 2017-01-12 PROCEDURE — 12800006 ZZH R&B REHAB

## 2017-01-12 PROCEDURE — 97116 GAIT TRAINING THERAPY: CPT | Mod: GP | Performed by: PHYSICAL THERAPIST

## 2017-01-12 PROCEDURE — 97535 SELF CARE MNGMENT TRAINING: CPT | Mod: GO | Performed by: STUDENT IN AN ORGANIZED HEALTH CARE EDUCATION/TRAINING PROGRAM

## 2017-01-12 PROCEDURE — 40000225 ZZH STATISTIC SLP WARD VISIT

## 2017-01-12 PROCEDURE — 40000133 ZZH STATISTIC OT WARD VISIT: Performed by: STUDENT IN AN ORGANIZED HEALTH CARE EDUCATION/TRAINING PROGRAM

## 2017-01-12 PROCEDURE — 97530 THERAPEUTIC ACTIVITIES: CPT | Mod: GP

## 2017-01-12 RX ADMIN — LISINOPRIL 20 MG: 20 TABLET ORAL at 07:44

## 2017-01-12 NOTE — TELEPHONE ENCOUNTER
Mac is in transitional care now. I spoke to Shannan. They can follow up as needed.  Edmond Cano, MPAS, PA-C

## 2017-01-12 NOTE — PLAN OF CARE
Problem: Goal/Outcome  Goal: Goal Outcome Summary  Patient is alert and oriented  X 4. Denies any pain. Continent of bladder. Using urinal  Independently. Staff assisting to empty urinal. Min  assist of 1 for transfers.independently repositions himself in bed. Regular diet with thin liquids. Independent  with eating.Will continue with poc.

## 2017-01-12 NOTE — PROGRESS NOTES
"CLINICAL NUTRITION SERVICES - ASSESSMENT NOTE     Nutrition Prescription    RECOMMENDATIONS FOR MDs/PROVIDERS TO ORDER:  None    Malnutrition Status:    Pt does not meet criteria    Recommendations already ordered by Registered Dietitian (RD):  None    Future/Additional Recommendations:  Continue regular diet with intakes to meet assessed needs.     REASON FOR ASSESSMENT  Maldonado Mendiola is a/an 51 year old male assessed by the dietitian for LOS    NUTRITION HISTORY  Pt is on a regular diet at home with typical good intakes.     CURRENT NUTRITION ORDERS  Diet: Regular  Intake/Tolerance: Pt reports his appetite is fair as he feels his physical activity is less than his baseline. He has been ordering breakfast or breakfast and lunch from [employer] and eating remainder of his meals as outside food.    LABS  Labs reviewed    MEDICATIONS  Medications reviewed    ANTHROPOMETRICS  Height: 182.9 cm (6' 0\")  Most Recent Weight: 78.835 kg (173 lb 12.8 oz)    IBW: 78.8 kg  BMI: Normal BMI  Weight History: Pt denies any recent significant weight changes. He thinks he may have lost a little weight during hospital course.    Dosing Weight: 79 kg    ASSESSED NUTRITION NEEDS  Estimated Energy Needs: 3300-0552 kcals/day (25 - 30 kcals/kg)  Justification: Maintenance  Estimated Protein Needs: 79-95 grams protein/day (1 - 1.2 grams of pro/kg)  Justification: Maintenance  Estimated Fluid Needs: 1 mL/kcal or per MD goals   Justification: Maintenance    PHYSICAL FINDINGS  See malnutrition section below.  No abnormal nutrition-related physical findings observed.     MALNUTRITION  % Intake: No decreased intake noted  % Weight Loss: None noted  Subcutaneous Fat Loss: None observed  Muscle Loss: None observed  Fluid Accumulation/Edema: None noted  Malnutrition Diagnosis: Patient does not meet two of the above criteria necessary for diagnosing malnutrition    NUTRITION DIAGNOSIS  No nutrition diagnosis at this time. "     INTERVENTIONS  Implementation  Nutrition Education: reviewed current diet order and encouraged po     Monitoring/Evaluation  Will follow in weekly rounds.      Kusum Wolf RD

## 2017-01-12 NOTE — PLAN OF CARE
Problem: Goal/Outcome  Goal: Goal Outcome Summary  Occupational Therapy treatment session with use of the FES UE Ergometer :   Session # 2  Passive motion assessed to ensure proper positioning. Purpose of use of  with this pt is to achieve improvement in R UE muscle grading w/ repeated movement w/ bio feedback of screen and support of functional estim for muscle engagement w/ programmed muscle pattern facilitation.  Good muscle contraction was observed in all muscle groups (please refer to [email or web address].com for stimulation parameters). OT adjusted e-stim and cycling parameters in real-time to ensure appropriate targeted muscle contraction/response throughout session. Patient tolerated cycling well with no adverse response noted.  Plan for future use: 01/16/17  For full cycling report, please refer to [employer], patient ID [id number] and pin [id number].

## 2017-01-12 NOTE — PLAN OF CARE
Problem: Goal/Outcome  Goal: Goal Outcome Summary  Outcome: Improving  FOCUS/GOAL  Bowel management and Medical management    ASSESSMENT, INTERVENTIONS AND CONTINUING PLAN FOR GOAL:  Systolic BP at start of shift is 100 encouraged more fluid systolic BP at hs is 144   Last bm was 2 days ago stool softeners offered but declined , suggested pt to order prune juice with his breakfast tray in AM .  Wife help pt with all hs cares   Bed alarm was not working bed was changed but the bed extender was not working SPD does not have anymore bed extender at this time , pt was updated and will have a bed extender as soon is available , pt just wants a PCD on the rt leg but it keeps beeping he requested it taken off .

## 2017-01-12 NOTE — PROGRESS NOTES
[facility]   Acute Rehabilitation Unit  Daily progress note    interval history  Maldonado Mendiola was seen and examined at bedside. No acute events. No complaints this am.  Voiding well.  BM two days ago.       medications    lisinopril  20 mg Oral Daily        senna-docusate, acetaminophen, melatonin, hydrALAZINE     physical exam  /62 mmHg  Pulse 63  Temp(Src) 97.7  F (36.5  C) (Oral)  Resp 16  Ht 1.829 m (6')  Wt 78.835 kg (173 lb 12.8 oz)  BMI 23.57 kg/m2  SpO2 100%  HEENT: AT/NC, PERRL  Pulmonary: CTA b/l, no wheezing, rhonchi, rales  Cardiovascular: S1/S2, 3/5 systolic ejection murmer heard best apex  Abdominal: Pos bowel sounds, soft, non tender, non distended  Extremities: moving all extremities, no edema, good pulses  MSK/neuro: 3/5 hip flexion, dorsiflexion, knee flexion right    labs  Reviewed.     assessment and plan  Maldonado Mendiola is a 51 year old right hand dominant male history untreated hypertension with new left basal ganglia stroke 2/2 HTN emergency.  Profound right sided weakness, mild aphasia, right facial droop.  [place] [facility], Admitted acute inpatient rehab 01/06/2017.        1.  Rehabilitation: begin acute inpatient rehabilitation from a multidisciplinary approach.  PT, OT and SLP  For total 3 hours daily, in addition to rehab nursing and close management of physiatrist.      2.  Left basal ganglia stroke: profound right sided weakness UE/LE, aphasia.                -BP control, goal normotensive                -continue lisinopril 20mg daily              -prn hydralazine if needed              -rehab plan above              -f/u neurology / stroke service locally (needs new pt visit or consult)    3.  Hypertension;  Recently treated for HTN emergency s/p IPH,  Has been stable on lisinipril 20mg daily which is continued.  Given history we've added prn hydralazine for BP >150/95.  Consider IM consult if becomes diff  to control.      4.  Heart murmer: 3/5 systolic ejection murmer best heard at apex. Pt states informed about this during his acute hospital stay this week but before that was unaware.  Appears stable without signs of heart failure.  Denies CP, SOB, swelling. Given history including HTN emergency feel may warrant workup. EKG reportedly normal per chart. Consider echocardiogram. IM/cards f/u if appropriate.     5.  Risk of electrolyte abnormality: f/u admission BMP    6.  Risk of anemia: f/u admission CBC     7.  F/E/N:  Appears well hydrated, f/u BMP, tolerating regular diet / thins continued, f/u SLP eval.     Additional rehab concerns:   Adjustment to disability:  Clinical psychology to eval and treat if appropriate  FEN: well hydrated, tolerating regular diet, nutrition consult if appropriate.  Bowel: bowel program if deemed necessary   Bladder: PVRs and/or bladder program if deemed necessary  DVT Prophylaxis: chemical with Lovenox daily.  Significantly impaired mobility.    GI Prophylaxis: PPI or BID zantac if appropriate  Code: Full code   Disposition: Home  ELOS:  3-4 weeks   Rehab prognosis:  Fair - Good   Follow up Appointments / Discharge planning:   F/u with PCP  Establish care with neurology/stroke provider locally  Consider evaluation of heart murmur     Cont making progress, CGA with stand pivot transfers. Ramsey with bed mob, wife participating.  Still profound weakness right UE. Mild constipation, asymptomatic, has prns.  Appears medically stable.  Consider echo to eval murmer (see above), appropriate for acute rehab therapies continued.     Patient seen and discussed with Dr. Beauchamp,  PM&R Staff Physician    Rashad Barrientos, DO  PM&R, PGY2    I, Dr. Beauchamp, also saw and examined Mac.     I have reviewed and edited the above resident note and agree.

## 2017-01-12 NOTE — PLAN OF CARE
Problem: Goal/Outcome  Goal: Goal Outcome Summary  Outcome: Therapy, progress toward functional goals as expected  PTA: Pt tolerated session well overall, wife present for session. CGA with stand pivot transfers. Ramsey with bed mob.

## 2017-01-12 NOTE — PLAN OF CARE
Problem: Goal/Outcome  Goal: Goal Outcome Summary  Outcome: No Change  Alert and oriented to self, place and time, repositioning ind in bed, ind with using the urinal and staff empties it, slept most of the night, will continue POC

## 2017-01-13 PROCEDURE — 97116 GAIT TRAINING THERAPY: CPT | Mod: GP

## 2017-01-13 PROCEDURE — 12800006 ZZH R&B REHAB

## 2017-01-13 PROCEDURE — 40000193 ZZH STATISTIC PT WARD VISIT

## 2017-01-13 PROCEDURE — 97535 SELF CARE MNGMENT TRAINING: CPT | Mod: GO

## 2017-01-13 PROCEDURE — 40000133 ZZH STATISTIC OT WARD VISIT

## 2017-01-13 PROCEDURE — 25000132 ZZH RX MED GY IP 250 OP 250 PS 637: Performed by: PHYSICAL MEDICINE & REHABILITATION

## 2017-01-13 PROCEDURE — 40000225 ZZH STATISTIC SLP WARD VISIT

## 2017-01-13 PROCEDURE — 97532 ZZHC SP COGNITIVE SKILLS EA 15 MIN: CPT | Mod: GN

## 2017-01-13 PROCEDURE — 97112 NEUROMUSCULAR REEDUCATION: CPT | Mod: GO

## 2017-01-13 RX ORDER — POLYETHYLENE GLYCOL 3350 17 G/17G
17 POWDER, FOR SOLUTION ORAL DAILY PRN
Status: DISCONTINUED | OUTPATIENT
Start: 2017-01-13 | End: 2017-01-24 | Stop reason: HOSPADM

## 2017-01-13 RX ORDER — LISINOPRIL 10 MG/1
10 TABLET ORAL DAILY
Status: DISCONTINUED | OUTPATIENT
Start: 2017-01-14 | End: 2017-01-24 | Stop reason: HOSPADM

## 2017-01-13 RX ADMIN — LISINOPRIL 20 MG: 20 TABLET ORAL at 07:46

## 2017-01-13 NOTE — PLAN OF CARE
Problem: Goal/Outcome  Goal: Goal Outcome Summary  Alert and oriented X four. Denies any pain. Continent of bladder. Using urinal independently. Staff assisting to empty urinal. Min  assist of 1 for transfers. Independently repositions himself in bed. Offers no concerns.

## 2017-01-13 NOTE — PLAN OF CARE
Problem: Goal/Outcome  Goal: Goal Outcome Summary  PT: pt progressing extremely well with ambulation today. Completed 245 ft with CGA using WBQC and w/c follow.     Mobility status updated in room to CGA with NBQC. Family training and wife able to demo safety with gait belt, transfers, and amb into bathroom with pt. OK for wife Shannan to take pt to bathroom and amb in room with gait belt and WBQC.

## 2017-01-13 NOTE — PROGRESS NOTES
[facility]   Acute Rehabilitation Unit  Daily progress note    interval history  Maldonado Mendiola was seen and examined at bedside. No acute events. No complaints this am.  Voiding well.  BM two days ago.       medications    [START ON 1/14/2017] lisinopril  10 mg Oral Daily        polyethylene glycol, senna-docusate, acetaminophen, melatonin, hydrALAZINE     physical exam  /60 mmHg  Pulse 65  Temp(Src) 95.6  F (35.3  C) (Oral)  Resp 16  Ht 1.829 m (6')  Wt 71.759 kg (158 lb 3.2 oz)  BMI 21.45 kg/m2  SpO2 96%  HEENT: AT/NC, PERRL  Pulmonary: CTA b/l, no wheezing, rhonchi, rales  Cardiovascular: S1/S2, 3/5 systolic ejection murmer heard best apex  Abdominal: Pos bowel sounds, soft, non tender, non distended  Extremities: moving all extremities, no edema, good pulses  MSK/neuro: 3/5 hip flexion, dorsiflexion, knee flexion right    labs  Reviewed.     assessment and plan  Maldonado Mendiola is a 51 year old right hand dominant male history untreated hypertension with new left basal ganglia stroke 2/2 HTN emergency.  Profound right sided weakness, mild aphasia, right facial droop.  [place] hospital, Admitted acute inpatient rehab 01/06/2017.        1.  Rehabilitation: begin acute inpatient rehabilitation from a multidisciplinary approach.  PT, OT and SLP  For total 3 hours daily, in addition to rehab nursing and close management of physiatrist.      2.  Left basal ganglia stroke: profound right sided weakness UE/LE, aphasia.                -BP control, goal normotensive                -continue lisinopril 20mg daily              -prn hydralazine if needed              -rehab plan above              -f/u neurology / stroke service locally (needs new pt visit or consult)    3.  Hypertension;  Recently treated for HTN emergency s/p IPH,  Has been stable on lisinipril 20mg daily which is continued.  Given history we've added prn hydralazine for BP  >150/95.  Consider IM consult if becomes diff to control.      4.  Heart murmer: 3/5 systolic ejection murmer best heard at apex. Pt states informed about this during his acute hospital stay this week but before that was unaware.  Appears stable without signs of heart failure.  Denies CP, SOB, swelling.  EKG reportedly normal per chart.  IM/cards f/u if appropriate. Would recommend work-up as outpatient.     5.  Risk of electrolyte abnormality: f/u admission BMP    6.  Risk of anemia: f/u admission CBC     7.  F/E/N:  Appears well hydrated, f/u BMP, tolerating regular diet / thins continued, f/u SLP eval.     Additional rehab concerns:   Adjustment to disability:  Clinical psychology to eval and treat if appropriate  FEN: well hydrated, tolerating regular diet, nutrition consult if appropriate.  Bowel: bowel program if deemed necessary   Bladder: PVRs and/or bladder program if deemed necessary  DVT Prophylaxis: chemical with Lovenox daily.  Significantly impaired mobility.    GI Prophylaxis: PPI or BID zantac if appropriate  Code: Full code   Disposition: Home  ELOS:  3-4 weeks   Rehab prognosis:  Fair - Good   Follow up Appointments / Discharge planning:   F/u with PCP  Establish care with neurology/stroke provider locally  Consider evaluation of heart murmur     Cont making progress, Still profound weakness right UE.  PT to start working on stairs as early as next week, discussed with pt and wife today. Constipation resolved, small BM yesterday, cont prns.  Appears medically stable.  Consider echo to eval murmer (see above), appropriate for acute rehab therapies continued.     Patient seen and discussed with Dr. Beauchamp,  PM&R Staff Physician    Rashad Barrientos, DO  PM&R, PGY2    I, Dr. Beauchamp, also saw and examined Mac.     I have reviewed and edited the above resident note and agree.

## 2017-01-13 NOTE — PLAN OF CARE
Problem: Goal/Outcome  Goal: Goal Outcome Summary  Outcome: Improving  Patient was in good spirits this evening.  Took a nap, awoke and ate a large meal with family present.  His wife assisted him to use the bathroom when she was here without issue.    /62 and then 97/59 later.  Denies feeling lightheaded or dizzy.  Encouraged fluids.  Note left for MD to assess the need for his BP medication tomorrow.

## 2017-01-13 NOTE — PLAN OF CARE
Problem: Goal/Outcome  Goal: Goal Outcome Summary  Outcome: Improving  Alert and oriented to self, place and time, ind with using the urinal and staff empties it, /67, repositioning ind in bed, slept most of the night, will continue POC.

## 2017-01-14 PROCEDURE — 40000193 ZZH STATISTIC PT WARD VISIT

## 2017-01-14 PROCEDURE — 40000133 ZZH STATISTIC OT WARD VISIT

## 2017-01-14 PROCEDURE — 12800006 ZZH R&B REHAB

## 2017-01-14 PROCEDURE — 97532 ZZHC SP COGNITIVE SKILLS EA 15 MIN: CPT | Mod: GN

## 2017-01-14 PROCEDURE — 97535 SELF CARE MNGMENT TRAINING: CPT | Mod: GO | Performed by: OCCUPATIONAL THERAPIST

## 2017-01-14 PROCEDURE — 97535 SELF CARE MNGMENT TRAINING: CPT | Mod: GO

## 2017-01-14 PROCEDURE — 25000132 ZZH RX MED GY IP 250 OP 250 PS 637: Performed by: PHYSICAL MEDICINE & REHABILITATION

## 2017-01-14 PROCEDURE — 97530 THERAPEUTIC ACTIVITIES: CPT | Mod: GO | Performed by: OCCUPATIONAL THERAPIST

## 2017-01-14 PROCEDURE — 97116 GAIT TRAINING THERAPY: CPT | Mod: GP

## 2017-01-14 PROCEDURE — 97110 THERAPEUTIC EXERCISES: CPT | Mod: GP

## 2017-01-14 PROCEDURE — 97112 NEUROMUSCULAR REEDUCATION: CPT | Mod: GP

## 2017-01-14 PROCEDURE — 40000225 ZZH STATISTIC SLP WARD VISIT

## 2017-01-14 PROCEDURE — 97110 THERAPEUTIC EXERCISES: CPT | Mod: GO

## 2017-01-14 PROCEDURE — 40000133 ZZH STATISTIC OT WARD VISIT: Performed by: OCCUPATIONAL THERAPIST

## 2017-01-14 RX ADMIN — LISINOPRIL 10 MG: 10 TABLET ORAL at 07:34

## 2017-01-14 NOTE — PLAN OF CARE
Problem: Goal/Outcome  Goal: Goal Outcome Summary  PT- pt's wife and son present.  Pt cooperative did well with tall kneeling activity

## 2017-01-14 NOTE — PLAN OF CARE
Problem: Goal/Outcome  Goal: Goal Outcome Summary  Outcome: No Change  Alert and oriented to self, place and time, continue to be ind with using the urinal and staff empties it. Repositioning ind in bed, slept most of the night, will continue POC

## 2017-01-14 NOTE — PLAN OF CARE
Problem: Goal/Outcome  Goal: Goal Outcome Summary  Outcome: Improving  Focus: Physio  D: Doing well.  Offers no new complaints.  Sat on the side of the bed for breakfast.  Right sided weakness. Voids via urinal and staff empties. Last bm 1-12-17. Up with assist of one and walker. P: Continue current plan of care.

## 2017-01-14 NOTE — PLAN OF CARE
Problem: Goal/Outcome  Goal: Goal Outcome Summary  FOCUS/GOAL    ASSESSMENT, INTERVENTIONS AND CONTINUING PLAN FOR GOAL:    Pt had no c/o pain.Up with assist of 1 with transfers bed <> w/c, wife is OK to transfer pt.Continent of urine , using urinal for voiding, staff empties after.

## 2017-01-14 NOTE — PROGRESS NOTES
[facility], [place]   Physical Medicine and Rehabilitation Daily Note           Assessment and Plan of Care:   51 year old R handed dominant male with untreated HTN with L basal ganglia hemorrhagic stroke admitted to acute rehab  On 02/06/17 for ongoing medical management and functional rehabilitation. Pt presents with R hemiparesis, mild aphasia with impaired ADLs and mobility.    --Vitals stable. No lab today.  --Continue ongoing medical management.  --Continue therapies and plan of care. NBQC for mobility.  -- Regular diet with thin liquids  --DVT prophylaxis: Lovenox            Review of Systems:   Denies fever, chills, CP, SOB, N/V, abdominal pain, new pain or weakness/numbness/tingling.             Physical Exam:     Filed Vitals:    01/13/17 0703 01/13/17 0739 01/13/17 1604 01/14/17 0732   BP:  109/60 107/62 103/58   Pulse:  65 67    Temp:  95.6  F (35.3  C) 96.3  F (35.7  C) 97.6  F (36.4  C)   TempSrc:  Oral Oral Oral   Resp:  16 18 16   Height:       Weight: 71.759 kg (158 lb 3.2 oz)      SpO2:  96% 97% 98%     Gen: NAD, resting in bed  Heart: RRR  Lungs: non-labored breathing on room air  Abd: soft and non-tender  Ext: wwp, no edema in BLE, no tenderness in calves  MSK/neuro: alert and oriented. speech fluent.  No volitional movement in RUE. RLE 3/5 throughout. No increase in tone.          Data:   Scheduled meds    lisinopril  10 mg Oral Daily       PRN meds:  polyethylene glycol, senna-docusate, acetaminophen, melatonin, hydrALAZINE      Merlene Banuelos MD PGY2  Physical Medicine and Rehabilitation     Patient seen and discussed with Dr. Watts, staff physician.    Attending's note   Patient has been seen, examined and evaluated by me independently. I reviewed today's vitals signs, lab values, imaging, medications, and current issues including medical, functional and social history significant to this admission.      I agree with the above note which reflects my direct  input and interpretation of progress.         Hope Watts MD

## 2017-01-14 NOTE — PLAN OF CARE
Problem: Goal/Outcome  Goal: Goal Outcome Summary  Continuing to focus on activities to challenge standing balance while also incorporating more R glut/quad activation. Also starting to work on more isolated R ankle activation to incorporate into late stance on RLE.

## 2017-01-14 NOTE — PLAN OF CARE
Problem: Goal/Outcome  Goal: Goal Outcome Summary  Outcome: Therapy, progress toward functional goals as expected  OT Patient reports good progress. Standing tolelrance with weight shifting ability and core strengthening for session tolerating well. Trialed new elastic laces with good results - Mod A for R shoe.

## 2017-01-15 PROCEDURE — 97532 ZZHC SP COGNITIVE SKILLS EA 15 MIN: CPT | Mod: GN

## 2017-01-15 PROCEDURE — 97535 SELF CARE MNGMENT TRAINING: CPT | Mod: GO

## 2017-01-15 PROCEDURE — 12800006 ZZH R&B REHAB

## 2017-01-15 PROCEDURE — 97530 THERAPEUTIC ACTIVITIES: CPT | Mod: GO

## 2017-01-15 PROCEDURE — 97112 NEUROMUSCULAR REEDUCATION: CPT | Mod: GP

## 2017-01-15 PROCEDURE — 25000132 ZZH RX MED GY IP 250 OP 250 PS 637: Performed by: PHYSICAL MEDICINE & REHABILITATION

## 2017-01-15 PROCEDURE — 97530 THERAPEUTIC ACTIVITIES: CPT | Mod: GP

## 2017-01-15 PROCEDURE — 40000133 ZZH STATISTIC OT WARD VISIT

## 2017-01-15 PROCEDURE — 40000225 ZZH STATISTIC SLP WARD VISIT

## 2017-01-15 PROCEDURE — 40000193 ZZH STATISTIC PT WARD VISIT

## 2017-01-15 RX ADMIN — MELATONIN TAB 3 MG 3 MG: 3 TAB at 20:56

## 2017-01-15 RX ADMIN — SENNOSIDES AND DOCUSATE SODIUM 2 TABLET: 8.6; 5 TABLET ORAL at 07:33

## 2017-01-15 RX ADMIN — LISINOPRIL 10 MG: 10 TABLET ORAL at 07:32

## 2017-01-15 NOTE — PROGRESS NOTES
[facility], [place]   Physical Medicine and Rehabilitation Daily Note           Assessment and Plan of Care:   51 year old R handed dominant male with untreated HTN with L basal ganglia hemorrhagic stroke admitted to acute rehab on 02/06/17 for ongoing medical management and functional rehabilitation. Pt presents with R hemiparesis, mild aphasia with impaired ADLs and mobility.    --Vitals stable. No new labs today.  --Continue ongoing medical management  --Continue therapies and plan of care. NBQC for mobility. PT working on exercises for more isolated R ankle activation.  --DVT prophylaxis: Lovenox : patient is not on Lovenox, which was stopped on 1/9/2017. Dr Watts            Review of Systems:   No acute events overnight. Doing well. Using call light. Sleep appropriately. Denies fever, chills, CP, SOB, N/V, abdominal pain, new pain or weakness/numbness/tingling.             Physical Exam:     Filed Vitals:    01/13/17 1604 01/14/17 0732 01/14/17 2149 01/15/17 0731   BP: 107/62 103/58 115/68 119/72   Pulse: 67  72    Temp: 96.3  F (35.7  C) 97.6  F (36.4  C) 97.5  F (36.4  C) 97.7  F (36.5  C)   TempSrc: Oral Oral Oral Oral   Resp: 18 16 18 16   Height:       Weight:       SpO2: 97% 98% 98% 96%     Gen: NAD, sitting at EOB, seen during therapies  Lungs: non-labored breathing on room air  Ext: wwp, no edema in BLE, no tenderness in calves  MSK/neuro: Alert and oriented. Speech fluent.  No volitional movement in RUE. RLE 3/5 throughout. R AFO in place. Good sitting at EOB balance.  Transfers with CGA. Bed mobility CGA-SBA,            Data:   Scheduled meds    lisinopril  10 mg Oral Daily       PRN meds:  polyethylene glycol, senna-docusate, acetaminophen, melatonin, hydrALAZINE      Merlene Banuelos MD PGY2  Physical Medicine and Rehabilitation     Patient seen and discussed with Dr. Watts, staff physician.    Attending's note   Patient has been seen, examined and evaluated by me  independently. I reviewed today's vitals signs, lab values, imaging, medications, and current issues including medical, functional and social history significant to this admission.    Patient is not Lovenox, which was corrected in the above note  I agree with the above note which reflects my direct input and interpretation of progress.     Hope Watts MD

## 2017-01-15 NOTE — PLAN OF CARE
Problem: Goal/Outcome  Goal: Goal Outcome Summary    Patient tolerating being engaged in higher level problem solving task and completing with just mild level of assistance. Utilizing strategies and ideas being presented to him.

## 2017-01-15 NOTE — PLAN OF CARE
Problem: Goal/Outcome  Goal: Goal Outcome Summary  Outcome: Improving  Focus: Physio  D: Doing well.  Feels a little constipated.  Senokot given. He is aware to call if this does not work and we can up the dose.  Uses urinal and staff empties.  Continent of both urine and stool. P: Continue current plan of care.

## 2017-01-15 NOTE — PLAN OF CARE
Problem: Goal/Outcome  Goal: Goal Outcome Summary  Outcome: Improving  Alert and oriented to self,place and time, using call light appropriately, slept most of the night, will continue POC

## 2017-01-15 NOTE — PLAN OF CARE
Problem: Goal/Outcome  Goal: Goal Outcome Summary  Trialed Bobath sling for R shld sublux - not fitting particularly well, attempted several configurations for best fit. Requested pt trial for evening and report back symptoms. Will work with OT to find better fitting sling for R shoulder.    Improving with R hip control - focused on more tall kneeling<>half kneeling today with assist, slowly requiring less assist from PT. Should help with stability through RLE in stance phase.    Initiating FES for RLE tomorrow.

## 2017-01-15 NOTE — PLAN OF CARE
Problem: Goal/Outcome  Goal: Goal Outcome Summary  FOCUS/GOAL  Medical management    ASSESSMENT, INTERVENTIONS AND CONTINUING PLAN FOR GOAL:  Patient is alert/oriented x4, is able to use call light appropriately for needs.  Patient denies any pain on this shift, son was here for most of the evening and took patient in w/c around the unit.  Patient BP has been WNL and appetite was adequate, no new medical concerns at this time, continue with POC.

## 2017-01-15 NOTE — PLAN OF CARE
Problem: Goal/Outcome  Goal: Goal Outcome Summary  OT:Pt. Educated and provided handouts for energy conservation and higher lever one handed techniques. Completed kitchen task and dynamic standing balance tasks with CGA. Pt. Demo increased ability for fair+ dynamic standing balance.

## 2017-01-16 PROCEDURE — 40000193 ZZH STATISTIC PT WARD VISIT

## 2017-01-16 PROCEDURE — 40000133 ZZH STATISTIC OT WARD VISIT: Performed by: STUDENT IN AN ORGANIZED HEALTH CARE EDUCATION/TRAINING PROGRAM

## 2017-01-16 PROCEDURE — 25000132 ZZH RX MED GY IP 250 OP 250 PS 637: Performed by: PHYSICAL MEDICINE & REHABILITATION

## 2017-01-16 PROCEDURE — 97535 SELF CARE MNGMENT TRAINING: CPT | Mod: GO | Performed by: STUDENT IN AN ORGANIZED HEALTH CARE EDUCATION/TRAINING PROGRAM

## 2017-01-16 PROCEDURE — 40000225 ZZH STATISTIC SLP WARD VISIT: Performed by: SPEECH-LANGUAGE PATHOLOGIST

## 2017-01-16 PROCEDURE — 97535 SELF CARE MNGMENT TRAINING: CPT | Mod: GO | Performed by: OCCUPATIONAL THERAPIST

## 2017-01-16 PROCEDURE — 97110 THERAPEUTIC EXERCISES: CPT | Mod: GP

## 2017-01-16 PROCEDURE — 97532 ZZHC SP COGNITIVE SKILLS EA 15 MIN: CPT | Mod: GN | Performed by: SPEECH-LANGUAGE PATHOLOGIST

## 2017-01-16 PROCEDURE — 40000133 ZZH STATISTIC OT WARD VISIT: Performed by: OCCUPATIONAL THERAPIST

## 2017-01-16 PROCEDURE — 97032 APPL MODALITY 1+ESTIM EA 15: CPT | Mod: GP

## 2017-01-16 PROCEDURE — 12800006 ZZH R&B REHAB

## 2017-01-16 RX ADMIN — LISINOPRIL 10 MG: 10 TABLET ORAL at 07:53

## 2017-01-16 NOTE — PLAN OF CARE
Problem: Goal/Outcome  Goal: Goal Outcome Summary  Outcome: Improving  Alert and oriented to self,place and time, pt did not use his call light all night so far, frequent rounding provided. Pt is repositioning ind in bed, slept most of the night, will continue poc

## 2017-01-16 NOTE — PLAN OF CARE
Problem: Goal/Outcome  Goal: Goal Outcome Summary  Skilled set up on :  Pt dependent for proper placement of electrodes on R quads, hams, gastrocs and tib ant for optimum muscle contraction, safe positioning on w/c within frame and cushion for prevention of skin breakdown, feet secured to foot pedals, w/c secured to  w/ Q-straints.  Passive motion assessed to ensure proper positioning.  Determined appropriate FES parameters for each muscle group based off of strong tetanic response of muscle test.    Pt performed 30 minutes of active FES ergometry with 0-100% stimulation applied to above muscles at 35 rpm with 0.5 nm resistance.  This PT adjusted e-stim and cycling parameters in real-time to ensure palpable muscle contractions throughout session.  Please see [email or web address] for further details on patient's stimulation parameters and ergometry outcomes.  Patient ID:[id number], PIN: [id number].    Focused on passive ESTIM for first 20 min, then attempted AROM with RLE but unable to maintain consistent pedaling for >10 sec intervals.

## 2017-01-16 NOTE — PLAN OF CARE
Problem: Goal/Outcome  Goal: Goal Outcome Summary  Occasional mild word fidning difficulty and very minimal dysarthria- practiced dysarthria strategies with tongue twisters- pt is 100% intelligible but notices some articulatory imprecision at times- cues to slow rate. Pt completed [employer] game for word finding- 100% accurate with determining alternative words to use to give clues. Pt at 5/5 and 5/5 accuracy - 100% on paragraph level info that he heard read 1x each.Completed complex deductive puzzle indpendently with 100% accuracy - but needing increased time to complete.

## 2017-01-16 NOTE — PROGRESS NOTES
[facility]   Acute Rehabilitation Unit  Daily progress note    interval history  Maldonado Mendiola was seen and examined at bedside. No acute events. No complaints this am.  Voiding well.  Regular BM       medications    lisinopril  10 mg Oral Daily        polyethylene glycol, senna-docusate, acetaminophen, melatonin, hydrALAZINE     physical exam  /70 mmHg  Pulse 65  Temp(Src) 96.5  F (35.8  C) (Oral)  Resp 17  Ht 1.829 m (6')  Wt 71.759 kg (158 lb 3.2 oz)  BMI 21.45 kg/m2  SpO2 99%  HEENT: AT/NC, PERRL  Pulmonary: CTA b/l, no wheezing, rhonchi, rales  Cardiovascular: S1/S2, 3/5 systolic ejection murmer heard best apex  Abdominal: Pos bowel sounds, soft, non tender, non distended  Extremities: moving all extremities, no edema, good pulses  MSK/neuro: 3/5 hip flexion, dorsiflexion, knee flexion right    labs  Reviewed.     assessment and plan  Maldonado Mendiola is a 51 year old right hand dominant male history untreated hypertension with new left basal ganglia stroke 2/2 HTN emergency.  Profound right sided weakness, mild aphasia, right facial droop.  [place] [facility], Admitted acute inpatient rehab 01/06/2017.        1.  Rehabilitation: begin acute inpatient rehabilitation from a multidisciplinary approach.  PT, OT and SLP  For total 3 hours daily, in addition to rehab nursing and close management of physiatrist.      2.  Left basal ganglia stroke: profound right sided weakness UE/LE, aphasia.                -BP control, goal normotensive                -continue lisinopril 20mg daily              -prn hydralazine if needed              -rehab plan above              -f/u neurology / stroke service locally (needs new pt visit or consult)    3.  Hypertension;  Recently treated for HTN emergency s/p IPH,  Has been stable on lisinipril 20mg daily which is continued.  Given history we've added prn hydralazine for BP >150/95.  Consider IM consult if  becomes diff to control.      4.  Heart murmer: 3/5 systolic ejection murmer best heard at apex. Pt states informed about this during his acute hospital stay this week but before that was unaware.  Appears stable without signs of heart failure.  Denies CP, SOB, swelling.  EKG reportedly normal per chart.  IM/cards f/u if appropriate. Would recommend work-up as outpatient.     5.  Risk of electrolyte abnormality: f/u admission BMP    6.  Risk of anemia: f/u admission CBC     7.  F/E/N:  Appears well hydrated, f/u BMP, tolerating regular diet / thins continued, f/u SLP eval.     Additional rehab concerns:   Adjustment to disability:  Clinical psychology to eval and treat if appropriate  FEN: well hydrated, tolerating regular diet, nutrition consult if appropriate.  Bowel: bowel program if deemed necessary   Bladder: PVRs and/or bladder program if deemed necessary  DVT Prophylaxis: lovenox discontinued 1/9.  Progressive mobility, mechanical    GI Prophylaxis: PPI or BID zantac if appropriate  Code: Full code   Disposition: Home  ELOS:  3-4 weeks   Rehab prognosis:  Fair - Good   Follow up Appointments / Discharge planning:   F/u with PCP  Establish care with neurology/stroke provider locally  Consider evaluation of heart murmur     Still profound weakness right UE.  Small amount subluxation suspected right UE, no neurovascular compromise beyond baseline weakness, Working with therapist to find comfortable sling/supprot for right arm.   Appears medically stable. Cont making gains in therapy, appropriate for acute rehab therapies continued.     Patient seen and discussed with Dr. Beauchamp,  PM&R Staff Physician    Rashad Barrientos, DO  PM&R, PGY2    I, Dr. Beauchamp, also saw and examined Mac.     I have reviewed and edited the above resident note and agree.

## 2017-01-16 NOTE — PLAN OF CARE
"Problem: Goal/Outcome  Goal: Goal Outcome Summary  FOCUS/GOAL  Caregiver training, Psychosocial needs and Prevention of secondary complications    ASSESSMENT, INTERVENTIONS AND CONTINUING PLAN FOR GOAL:  Pt here after hemorrhagic stroke with impaired cognition and mobility.  He has right sided body involvement and requested a support device for Rt arm.  If we could relay message to OT the pt and spouse would appreciate.  I educated wife on medications, including monitoring B/P to determine if pt requires PRN Hydralazine.  I encouraged her to obtain a B/P cuff so they are able to monitor.  I tried applying another bed extender and they broke the prongs again.  Although the patient is only 6'1\", his feet are constantly hitting the foot of the bed.  He discussed insomnia at [phone] and I encouraged the use of Melatonin (which he uses at home).  He started Senna today  (I added 1 tab to 0730 dose and administered) with no results.  I encouraged the use of Miralax after therapy tomorrow.  Continue to educate the pt and spouse to prevent future hospitalization.               "

## 2017-01-16 NOTE — PLAN OF CARE
Problem: Goal/Outcome  Goal: Goal Outcome Summary  OT: Issued pt a givemor sling to provide support to RUE when amb and during transfers. Encouraged pt to amb to the bathroom with his wife to build endurance and since he will have to walk into  the bathroom at home. Pt making great progress.

## 2017-01-17 PROCEDURE — 40000193 ZZH STATISTIC PT WARD VISIT

## 2017-01-17 PROCEDURE — 40000225 ZZH STATISTIC SLP WARD VISIT: Performed by: SPEECH-LANGUAGE PATHOLOGIST

## 2017-01-17 PROCEDURE — 97116 GAIT TRAINING THERAPY: CPT | Mod: GP

## 2017-01-17 PROCEDURE — 97532 ZZHC SP COGNITIVE SKILLS EA 15 MIN: CPT | Mod: GN | Performed by: SPEECH-LANGUAGE PATHOLOGIST

## 2017-01-17 PROCEDURE — 97112 NEUROMUSCULAR REEDUCATION: CPT | Mod: GO | Performed by: STUDENT IN AN ORGANIZED HEALTH CARE EDUCATION/TRAINING PROGRAM

## 2017-01-17 PROCEDURE — 97535 SELF CARE MNGMENT TRAINING: CPT | Mod: GO | Performed by: STUDENT IN AN ORGANIZED HEALTH CARE EDUCATION/TRAINING PROGRAM

## 2017-01-17 PROCEDURE — 40000133 ZZH STATISTIC OT WARD VISIT: Performed by: STUDENT IN AN ORGANIZED HEALTH CARE EDUCATION/TRAINING PROGRAM

## 2017-01-17 PROCEDURE — 12800006 ZZH R&B REHAB

## 2017-01-17 PROCEDURE — 25000132 ZZH RX MED GY IP 250 OP 250 PS 637: Performed by: PHYSICAL MEDICINE & REHABILITATION

## 2017-01-17 PROCEDURE — 97530 THERAPEUTIC ACTIVITIES: CPT | Mod: GP

## 2017-01-17 RX ADMIN — Medication 25 MG: at 20:30

## 2017-01-17 RX ADMIN — LISINOPRIL 10 MG: 10 TABLET ORAL at 07:59

## 2017-01-17 NOTE — PLAN OF CARE
Problem: Goal/Outcome  Goal: Goal Outcome Summary  FOCUS/GOAL  Medication management and Safety management    ASSESSMENT, INTERVENTIONS AND CONTINUING PLAN FOR GOAL:  Pt alert and oriented, family present, wife assisting with transfers. Refused shower this evening stating he receiving one this morning. Last BM 1/15. Refused PCD. Offered PRN melatonin, pt declined stating he did not like how it made him feel last night. Alarm on after family left, not impulsive. Assist of 1 with 4ww. Denies pain. Good appetite ate 100% dinner. Will continue to monitor.

## 2017-01-17 NOTE — PLAN OF CARE
Problem: Goal/Outcome  Goal: Goal Outcome Summary  Outcome: Therapy, progress toward functional goals as expected  See rounds and care conference note from 1/17/17.

## 2017-01-17 NOTE — PROGRESS NOTES
[facility]   Acute Rehabilitation Unit  Daily progress note    interval history  Maldonado Mendiola was seen and examined at bedside. No acute events. No complaints this am.  Voiding well.  Regular BM       medications    lisinopril  10 mg Oral Daily        traZODone, polyethylene glycol, senna-docusate, acetaminophen, melatonin, hydrALAZINE     physical exam  /62 mmHg  Pulse 78  Temp(Src) 97.8  F (36.6  C) (Oral)  Resp 16  Ht 1.829 m (6')  Wt 71.759 kg (158 lb 3.2 oz)  BMI 21.45 kg/m2  SpO2 98%  HEENT: AT/NC, PERRL  Pulmonary: CTA b/l, no wheezing, rhonchi, rales  Cardiovascular: S1/S2, 3/5 systolic ejection murmer heard best apex  Abdominal: Pos bowel sounds, soft, non tender, non distended  Extremities: moving all extremities, no edema, good pulses  MSK/neuro: 3/5 hip flexion, dorsiflexion, knee flexion right    labs  Reviewed.     assessment and plan  Maldonado Mendiola is a 51 year old right hand dominant male history untreated hypertension with new left basal ganglia stroke 2/2 HTN emergency.  Profound right sided weakness, mild aphasia, right facial droop.  [place] of [place] hospital, Admitted acute inpatient rehab 01/06/2017.        1.  Rehabilitation: begin acute inpatient rehabilitation from a multidisciplinary approach.  PT, OT and SLP  For total 3 hours daily, in addition to rehab nursing and close management of physiatrist.      2.  Left basal ganglia stroke: profound right sided weakness UE/LE, aphasia.                -BP control, goal normotensive                -continue lisinopril 20mg daily              -prn hydralazine if needed              -rehab plan above              -f/u neurology / stroke service locally (needs new pt visit or consult)    3.  Hypertension;  Recently treated for HTN emergency s/p IPH,  Has been stable on lisinipril 20mg daily which is continued.  Given history we've added prn hydralazine for BP >150/95.  Consider IM  consult if becomes diff to control.      4.  Heart murmer: 3/5 systolic ejection murmer best heard at apex. Pt states informed about this during his acute hospital stay this week but before that was unaware.  Appears stable without signs of heart failure.  Denies CP, SOB, swelling.  EKG reportedly normal per chart.  IM/cards f/u if appropriate. Would recommend work-up as outpatient.     5.  Risk of electrolyte abnormality: f/u admission BMP    6.  Risk of anemia: f/u admission CBC     7.  F/E/N:  Appears well hydrated, f/u BMP, tolerating regular diet / thins continued, f/u SLP eval.     Additional rehab concerns:   Adjustment to disability:  Clinical psychology to eval and treat if appropriate  FEN: well hydrated, tolerating regular diet, nutrition consult if appropriate.  Bowel: bowel program if deemed necessary   Bladder: PVRs and/or bladder program if deemed necessary  DVT Prophylaxis: lovenox discontinued 1/9.  Progressive mobility, mechanical    GI Prophylaxis: PPI or BID zantac if appropriate  Code: Full code   Disposition: Home  ELOS:  3-4 weeks   Rehab prognosis:  Fair - Good   Follow up Appointments / Discharge planning:   F/u with PCP  Establish care with neurology/stroke provider locally  Consider evaluation of heart murmur     LE strength, coordination, balance improving.  Right arm weakness persists, stable subluxation, wearing sling when appropriate, encouraging ROM. Trail of trazadone for sleep tonight.  Appears medically stable.  appropriate for acute rehab therapies continued.     Patient seen and discussed with Dr. Beauchamp,  PM&R Staff Physician    Rashad Barrientos, DO  PM&R, PGY2    I, Dr. Beauchamp, also saw and examined Mac.     I have reviewed and edited the above resident note and agree.

## 2017-01-17 NOTE — PLAN OF CARE
"Acute Rehab Care Conference/Team Rounds      Type: Team Rounds    Present: Dr. Miguel Beauchamp, Rashad Barrientos MD, Sinai Prasad SW, Patricia Strange OT, Iain Brown PT, Geneva Rodgers SLP,  Lisandra Wise , Sergey Jamison, Diana Oliva Dietician, Osmani Hicks RN      Discharge Barriers/Treatment/Education    Rehab Diagnosis: stroke    Active Medical Co-morbidities/Prognosis: hypertension,     Safety:Bed alarm activated    Pain:Denies pain since admission, has an order for tylenol incase mild pain or fever    Medications, Skin, Tubes/Lines: Takes meds whole with supervision. Pt has'nt participated on map yet, skin is intact    Swallowing/Nutrition: tolerates a regular diet without difficulty    Bowel/Bladder: Continent of bowel and bladder, ind with using the urinal and staff empties it, LBM 1/15    Psychosocial: Pt resides with his wife and their dependent son. Goal to return home with continued family support. Team working towards a safe d/c plan.    ADLs/IADLs: Pt is currently CGA for ambulatory self care routine and standing ADL. Pt does need assist for donning his R shoe and is working on improving his clothing management for toileting. Pt is making good progress as he increases his endurance and stability with walking and standing tasks. Pt's RUE is still non functional and pt started wearing givmore sling for added shoulder support when up. Pt has good support from wife. Recommend GB in the bathroom and a tub bench. Ideally will progress pt to MOD I before DC. Recommend OP OT for continued therapy.     Mobility: Pt progressing well with RLE hip and knee activation in stance phase during amb. Demo sup<>sit at independent, sit<>stand with NBQC CGA-SBA, amb 220' without AD at [place] using R AFO, navigating 12x6\" steps with single rail at [place]. Will continue to work on improving R hip/knee coordination and stability and standing dynamic balance to improve independence amb with NBQC while " supplementing FES for improved RLE strengthening. Recommend OP PT at d/c, will need NBQC and R AFO for d/c home, debating whether pt will also need manual w/c.    Cognition/Language: Very minimal to mild dysarthria and impairments with  complexcognition and complex word finding. Pt has made steady progress-- generally % accuracy now with more complex problem solving and memory tasks as well as with complex word finding- anticipate close to meeting goals. Pt is improving with using dysarthria strategies independently-- is 100% intelligibile but occasional mild articulatory imprecision- has been practicing with tongue twisters handouts given. Recommend OP sptx following d/c    Community Re-Entry: will amb very limited community distances.    Transportation: will require assist.    Decision maker: self    Plan of Care and goals reviewed and updated.    Discharge Plan/Recommendations    Fall Precautions: continue    Overall plan for the patient: Pleased with functional progress in leg though not moving arm yet. Compensatory strategies with one arm use. Stairs a particular barrier for safe discharge. Not clear with the rate of progress if he'll need a wheelchair for some of the home mobility. Looking at needing another week till d/c. Add care conference Friday. Plan outpatient PT, OT, SLP after discharge.       Utilization Review and Continued Stay Justification    Medical Necessity Criteria:    For any criteria that is not met, please document reason and plan for discharge, transfer, or modification of plan of care to address.    Requires intensive rehabilitation program to treat functional deficits?: Yes  Requires 3x per week or greater involvement of rehabilitation physician to oversee rehabilitation program?: Yes  Requires rehabilitation nursing interventions?: Yes  Patient is making functional progress?: Yes  There is a potential for additional functional progress? Yes  Patient is participating in therapy 3  hours per day a minimum of 5 days per week or 15 hours per week in 7 day period?:Yes  Has discharge needs that require coordinated discharge planning approach?:Yes      Final Physician Sign off    Statement of Approval: I agree with all the recommendations detailed in this document.      Patient Goals  1. Patient Goal: Social Work Focus: Patient will be involved in discharge planning.  2. Patient Goal: Social Work Focus: Patient will receive appropriate services and resources.       OT goal: hygiene/grooming: independent  OT goal: upper body dressing: Independent  OT goal: lower body dressing: Independent  OT goal: upper body bathing: Supervision/stand-by assist  OT goal: lower body bathing: Supervision/stand-by assist  OT Goal: transfer: Modified independent, with assistive device (walk in shower transfer)  OT goal: toilet transfer/toileting: Modified independent, toilet transfer, cleaning and garment management  OT goal: meal preparation: Modified independent, with simple meal preparation  OT goal: cognitive: Patient/caregiver will verbalize understanding of cognitive assessment results/recommendations as needed for safe discharge planning  OT goal 1: I with RUE HEP to improve strength and coordination for improving function for ADL/IADL and functional mobility  OT goal 2: Pt will independently manage and position his RUE during functional mobility and ADL/IADL in terms of positoning and protecting from injury  OT/ROGER face-to-face collaboration occurred, patient progress and plan of care reviewed.    PT target date for goal attainment: 01/24/17  PT Frequency: Daily x 60 min  PT goal: bed mobility: Independent  PT goal: transfers: Modified independent, Assistive device  PT goal: gait: Modified independent, 150 feet, Quad cane  PT goal: stairs: Modified independent, Greater than 10 stairs, Rail on left, Rail on both sides, Assistive device  PT goal: perform aerobic activity with stable cardiovascular response: 10  minutes, continuous activity  PT goal 1: Pt will attend falls prevention class  PT Goal 2: Pt will be indep with LE strengthening HEp  PT Goal 3: Pt will be educated on how to perform appropriate floor transfer  PT Goal 4: Pt will be SBA for car transfer    SLP target date for goal attainment: 01/20/17  SLP Frequency: daily  SLP goal 1: Pt will independently utilize dysarthria strategies to improve speech intelligibility in connected speech to 95% accuracy  SLP goal 2: Pt will independently utilize word retrieval strategies to improve verbal fluency/word retrieval to 90% accuracy in complex verbal expression tasks and convesation  SLP goal 3: Pt will independently utilized compensatory memory strategies to increase recent recall during complex tasks with 90% accuracy  SLP goal 4: Pt will complete complex problem solving and reasoning tasks with 90% accuracy   Patient/Family Goal: Medication Management: Pt has only one medication, no indication for MAP at this time.   Goal: Safety Management: Pt uses the nurs call light appropriately when needed.

## 2017-01-17 NOTE — PLAN OF CARE
Problem: Goal/Outcome  Goal: Goal Outcome Summary  Occupational Therapy treatment session with use of the FES UE Ergometer :   Session # 3  Passive motion assessed to ensure proper positioning. Purpose of use of  with this pt is to achieve improvement in R UE muscle grading w/ repeated movement w/ bio feedback of screen and support of functional estim for muscle engagement w/ programmed muscle pattern facilitation.   Good muscle contraction was observed in all muscle groups (please refer to [email or web address] for stimulation parameters). OT adjusted e-stim and cycling parameters in real-time to ensure appropriate targeted muscle contraction/response throughout session. Patient tolerated cycling well with no adverse response noted.  For full cycling report, please refer to [email or web address], patient ID [id number] and pin [id number].

## 2017-01-17 NOTE — PLAN OF CARE
Problem: Goal/Outcome  Goal: Goal Outcome Summary  Completed labiabl facial excs to improve right sided weakness; completed use of dysarthria strategies with reading tongue twisters out loud- gave pt more handouts to continue to practice on own. At times pt speeds up some but still is 100% intelligible but with mild articulatory imprecision- practice slow rate and exaggerating articulation.Played Unblock me on the i-pad- initially pt needing more cues to solve puzzle but by 3rd trial pt was solving independently with only mildly increased time needed to complete. Completed activity of Zaria Mcbride on the Ipad- iding faces presented  in a filed of other faces ( timed task)- pt only had 1 error when the target increased to recall of 3 faces.

## 2017-01-18 PROCEDURE — 97150 GROUP THERAPEUTIC PROCEDURES: CPT | Mod: GP | Performed by: STUDENT IN AN ORGANIZED HEALTH CARE EDUCATION/TRAINING PROGRAM

## 2017-01-18 PROCEDURE — 40000225 ZZH STATISTIC SLP WARD VISIT: Performed by: SPEECH-LANGUAGE PATHOLOGIST

## 2017-01-18 PROCEDURE — 97532 ZZHC SP COGNITIVE SKILLS EA 15 MIN: CPT | Mod: GN | Performed by: SPEECH-LANGUAGE PATHOLOGIST

## 2017-01-18 PROCEDURE — 97116 GAIT TRAINING THERAPY: CPT | Mod: GP

## 2017-01-18 PROCEDURE — 97112 NEUROMUSCULAR REEDUCATION: CPT | Mod: GO | Performed by: OCCUPATIONAL THERAPIST

## 2017-01-18 PROCEDURE — 40000133 ZZH STATISTIC OT WARD VISIT

## 2017-01-18 PROCEDURE — 12800006 ZZH R&B REHAB

## 2017-01-18 PROCEDURE — 97535 SELF CARE MNGMENT TRAINING: CPT | Mod: GO | Performed by: OCCUPATIONAL THERAPIST

## 2017-01-18 PROCEDURE — 25000132 ZZH RX MED GY IP 250 OP 250 PS 637: Performed by: PHYSICAL MEDICINE & REHABILITATION

## 2017-01-18 PROCEDURE — 40000133 ZZH STATISTIC OT WARD VISIT: Performed by: OCCUPATIONAL THERAPIST

## 2017-01-18 PROCEDURE — 40000193 ZZH STATISTIC PT WARD VISIT: Performed by: STUDENT IN AN ORGANIZED HEALTH CARE EDUCATION/TRAINING PROGRAM

## 2017-01-18 PROCEDURE — 40000193 ZZH STATISTIC PT WARD VISIT

## 2017-01-18 PROCEDURE — 97535 SELF CARE MNGMENT TRAINING: CPT | Mod: GO

## 2017-01-18 PROCEDURE — 25000130 H RX MED GY IP 250 OP 259 PS 637: Performed by: PHYSICAL MEDICINE & REHABILITATION

## 2017-01-18 RX ORDER — DIPHENHYDRAMINE HCL 25 MG
25-50 CAPSULE ORAL
Status: DISCONTINUED | OUTPATIENT
Start: 2017-01-18 | End: 2017-01-24 | Stop reason: HOSPADM

## 2017-01-18 RX ADMIN — LISINOPRIL 10 MG: 10 TABLET ORAL at 08:05

## 2017-01-18 RX ADMIN — DIPHENHYDRAMINE HYDROCHLORIDE 25 MG: 25 CAPSULE ORAL at 20:44

## 2017-01-18 NOTE — PLAN OF CARE
Problem: Goal/Outcome  Goal: Goal Outcome Summary  FOCUS/GOAL  Energy conservation, Safety management and Prevention of secondary complications    ASSESSMENT, INTERVENTIONS AND CONTINUING PLAN FOR GOAL:  Pt here following a stroke.  He presents with rt sided weakness (especially RUE), impaired cognition, and requires education on medical management.  We need to set-up a care conference with wife for this Friday to discuss possible discharge next week.  The pt is currently able to transfer with CGA1 and a quad cane.  He has had insomnia recently and was started on Trazadone 25mg, the pt slept most of the shift.  Continue to monitor his sleeping status and encourage voiding q2-3hrs.

## 2017-01-18 NOTE — PLAN OF CARE
Problem: Goal/Outcome  Goal: Goal Outcome Summary  2 Minute Walk Test Results:    Carbon Fiber: 99 ft with OMNI 2/10  OTS Plastic AFO: 100 ft with OMNI 3/10    These tests were performed to determine the best AFO for this pt's use going forward. He had very limited difference in performance between the two AFOs. Will continue to speak with pt and family regarding preference and insurance coverage of both orthoses.

## 2017-01-18 NOTE — PROGRESS NOTES
Pt and wife attended Falls Prevention class today with group of 6 patients. Pt selected for class due to documented gait deficit and falls risk. Class includes education in falls risks, how to decrease that risk through behavior and home modifications and energy conservation; and instruction in available equipment designed to increase home safety. Pt and wife were able to verbalize understanding of materials and participated appropriately in the discussion and problem-solving segments of the class.   Pt was instructed he will be quizzed later in order to demonstrate comprehension of material.

## 2017-01-18 NOTE — PLAN OF CARE
Problem: Goal/Outcome  Goal: Goal Outcome Summary  FOCUS/GOAL  Mobility    ASSESSMENT, INTERVENTIONS AND CONTINUING PLAN FOR GOAL:    Pt needs supervision with transfers to and from bed to w/c using quadcane. Able to make needs known, using call light appropriately,alarms activated for safety. Urinal use at bedside for toileting needs, staff empties after.Pt c/o having trouble sleeping at night, received PRN Melatonin  2 nights ago ,but was ineffective. MD ordered PRN Trazodone 25 mg ,received 1st dose tonight, will assess and document effect on pt, may need to increase dose if needed. Possible care conference on Friday and DC next week.

## 2017-01-18 NOTE — PROGRESS NOTES
"  [facility]   Acute Rehabilitation Unit  Daily progress note    interval history  Maldonado Mnediola was seen and examined at bedside. No acute events. \"Slept ok\"  Voiding well.  Regular BM       medications    lisinopril  10 mg Oral Daily        traZODone, polyethylene glycol, senna-docusate, acetaminophen, melatonin, hydrALAZINE     physical exam  /71 mmHg  Pulse 69  Temp(Src) 97.9  F (36.6  C) (Oral)  Resp 16  Ht 1.829 m (6')  Wt 71.759 kg (158 lb 3.2 oz)  BMI 21.45 kg/m2  SpO2 93%  HEENT: AT/NC, PERRL  Pulmonary: CTA b/l, no wheezing, rhonchi, rales  Cardiovascular: S1/S2, 3/5 systolic ejection murmer heard best apex  Abdominal: Pos bowel sounds, soft, non tender, non distended  Extremities: moving all extremities, no edema, good pulses  MSK/neuro: 3/5 hip flexion, dorsiflexion, knee flexion right    labs  Reviewed.     assessment and plan  Maldonado Mendiola is a 51 year old right hand dominant male history untreated hypertension with new left basal ganglia stroke 2/2 HTN emergency.  Profound right sided weakness, mild aphasia, right facial droop.  [place] [facility], Admitted acute inpatient rehab 01/06/2017.        1.  Rehabilitation: begin acute inpatient rehabilitation from a multidisciplinary approach.  PT, OT and SLP  For total 3 hours daily, in addition to rehab nursing and close management of physiatrist.      2.  Left basal ganglia stroke: profound right sided weakness UE/LE, aphasia.                -BP control, goal normotensive                -continue lisinopril 20mg daily              -prn hydralazine if needed              -rehab plan above              -f/u neurology / stroke service locally (needs new pt visit or consult)    3.  Hypertension;  Recently treated for HTN emergency s/p IPH,  Has been stable on lisinipril 20mg daily which is continued.  Given history we've added prn hydralazine for BP >150/95.  Consider IM consult if " becomes diff to control.      4.  Heart murmer: 3/5 systolic ejection murmer best heard at apex. Pt states informed about this during his acute hospital stay this week but before that was unaware.  Appears stable without signs of heart failure.  Denies CP, SOB, swelling.  EKG reportedly normal per chart.  IM/cards f/u if appropriate. Would recommend work-up as outpatient.     5.  Risk of electrolyte abnormality: f/u admission BMP    6.  Risk of anemia: f/u admission CBC     7.  F/E/N:  Appears well hydrated, f/u BMP, tolerating regular diet / thins continued, f/u SLP eval.     Additional rehab concerns:   Adjustment to disability:  Clinical psychology to eval and treat if appropriate  FEN: well hydrated, tolerating regular diet, nutrition consult if appropriate.  Bowel: bowel program if deemed necessary   Bladder: PVRs and/or bladder program if deemed necessary  DVT Prophylaxis: lovenox discontinued 1/9.  Progressive mobility, mechanical    GI Prophylaxis: PPI or BID zantac if appropriate  Code: Full code   Disposition: Home  ELOS:  3-4 weeks   Rehab prognosis:  Fair - Good   Follow up Appointments / Discharge planning:   F/u with PCP  Establish care with neurology/stroke provider locally  Consider evaluation of heart murmur     Continues making gains. Right arm weakness persists, stable subluxation, wearing sling when appropriate, encouraging ROM. Slept a little better last night but didn't like trazodone which we'll discontinue, not interested in alt for sleep, cont follow.  Appears medically stable.  appropriate for acute rehab therapies continued.     Patient seen and discussed with Dr. Beauchamp,  PM&R Staff Physician    Rashad Barrientos, DO  PM&R, PGY2    I, Dr. Beauchamp, also saw and examined Mac.     I have reviewed and edited the above resident note and agree.

## 2017-01-18 NOTE — PLAN OF CARE
Problem: Goal/Outcome  Goal: Goal Outcome Summary  SLP:  Pt making steady progress with dysarthria targets, in both structured and conversational tasks.  Motivated to apply strategies and participate in therapy.  Cognitive targets continue to improve as well, completed more complex level iPad based tasks today focusing on attention and reasoning, Pt able to learn the novel tasks and complete efficiently given minimal assist.

## 2017-01-18 NOTE — PLAN OF CARE
Problem: Goal/Outcome  Goal: Goal Outcome Summary  Outcome: Therapy, progress toward functional goals as expected  Pt is alert and oriented x 3, no c/o pain or discomfort. He uses the nurse call button as needed, he needs tray set up due to right sided weakness, minimal assist to contact guard assist for transfer and ambulation using a quad cane. He is going to have a care conference on 1/20/17 and dc plan by mid next week. We will continue with current plan of care.

## 2017-01-19 PROCEDURE — 40000133 ZZH STATISTIC OT WARD VISIT: Performed by: OCCUPATIONAL THERAPIST

## 2017-01-19 PROCEDURE — 97535 SELF CARE MNGMENT TRAINING: CPT | Mod: GO | Performed by: OCCUPATIONAL THERAPIST

## 2017-01-19 PROCEDURE — 40000225 ZZH STATISTIC SLP WARD VISIT

## 2017-01-19 PROCEDURE — 12800006 ZZH R&B REHAB

## 2017-01-19 PROCEDURE — 40000193 ZZH STATISTIC PT WARD VISIT

## 2017-01-19 PROCEDURE — 97530 THERAPEUTIC ACTIVITIES: CPT | Mod: GP

## 2017-01-19 PROCEDURE — 97116 GAIT TRAINING THERAPY: CPT | Mod: GP

## 2017-01-19 PROCEDURE — 92507 TX SP LANG VOICE COMM INDIV: CPT | Mod: GN

## 2017-01-19 PROCEDURE — 97112 NEUROMUSCULAR REEDUCATION: CPT | Mod: GP

## 2017-01-19 PROCEDURE — 25000130 H RX MED GY IP 250 OP 259 PS 637: Performed by: PHYSICAL MEDICINE & REHABILITATION

## 2017-01-19 PROCEDURE — 25000132 ZZH RX MED GY IP 250 OP 250 PS 637: Performed by: PHYSICAL MEDICINE & REHABILITATION

## 2017-01-19 RX ADMIN — DIPHENHYDRAMINE HYDROCHLORIDE 25 MG: 25 CAPSULE ORAL at 20:55

## 2017-01-19 RX ADMIN — LISINOPRIL 10 MG: 10 TABLET ORAL at 08:04

## 2017-01-19 RX ADMIN — ACETAMINOPHEN 650 MG: 325 TABLET, FILM COATED ORAL at 08:04

## 2017-01-19 NOTE — PLAN OF CARE
Problem: Goal/Outcome  Goal: Goal Outcome Summary  Outcome: Improving  FOCUS/GOAL  Pain management, Medical management and Mobility    ASSESSMENT, INTERVENTIONS AND CONTINUING PLAN FOR GOAL:  Pt's BP slightly elevated this morning than baseline. Given scheduled lisinopril and BP lower when rechecked. Pt also c/o pain on right lower leg this morning. Pain relieved by prn tylenol given and stretching exercises. Dr Barrientos and Dr Beauchamp aware. Pt participating in therapies.Pt using an AFO on right LE and a quad cane for walking. He needs supervision with stand pivot transfers to wheelchair.

## 2017-01-19 NOTE — PLAN OF CARE
Problem: Goal/Outcome  Goal: Goal Outcome Summary  Outcome: No Change  FOCUS/GOAL  Bladder management and Mobility    ASSESSMENT, INTERVENTIONS AND CONTINUING PLAN FOR GOAL:  Pt has appeared to be sleeping on rounds, and he is independent with repositioning himself in bed. Pt uses bedside urinal for voiding and staff empties.

## 2017-01-19 NOTE — PROGRESS NOTES
[facility]   Acute Rehabilitation Unit  Daily progress note    interval history  Maldonado Mendiola was seen and examined at bedside. No acute events. Slept better last night. C/o leg pain. Voiding well.  Regular BM       medications    lisinopril  10 mg Oral Daily        diphenhydrAMINE, polyethylene glycol, senna-docusate, acetaminophen, hydrALAZINE     physical exam  /74 mmHg  Pulse 79  Temp(Src) 97.4  F (36.3  C) (Oral)  Resp 16  Ht 1.829 m (6')  Wt 71.759 kg (158 lb 3.2 oz)  BMI 21.45 kg/m2  SpO2 97%  HEENT: AT/NC, PERRL  Pulmonary: CTA b/l, no wheezing, rhonchi, rales  Cardiovascular: S1/S2, 3/5 systolic ejection murmer heard best apex  Abdominal: Pos bowel sounds, soft, non tender, non distended  Extremities: moving all extremities, no edema, good pulses  MSK/neuro: 3/5 hip flexion, dorsiflexion, knee flexion right    labs  Reviewed.     assessment and plan  Maldonado Mendiola is a 51 year old right hand dominant male history untreated hypertension with new left basal ganglia stroke 2/2 HTN emergency.  Profound right sided weakness, mild aphasia, right facial droop.  [place] [facility], Admitted acute inpatient rehab 01/06/2017.        1.  Rehabilitation: begin acute inpatient rehabilitation from a multidisciplinary approach.  PT, OT and SLP  For total 3 hours daily, in addition to rehab nursing and close management of physiatrist.      2.  Left basal ganglia stroke: profound right sided weakness UE/LE, aphasia.                -BP control, goal normotensive                -continue lisinopril 20mg daily              -prn hydralazine if needed              -rehab plan above              -f/u neurology / stroke service locally (needs new pt visit or consult)    3.  Hypertension;  Recently treated for HTN emergency s/p IPH,  Has been stable on lisinipril 20mg daily which is continued.  Given history we've added prn hydralazine for BP >150/95.   Consider IM consult if becomes diff to control.      4.  Heart murmer: 3/5 systolic ejection murmer best heard at apex. Pt states informed about this during his acute hospital stay this week but before that was unaware.  Appears stable without signs of heart failure.  Denies CP, SOB, swelling.  EKG reportedly normal per chart.  IM/cards f/u if appropriate. Would recommend work-up as outpatient.     5.  Risk of electrolyte abnormality: f/u admission BMP    6.  Risk of anemia: f/u admission CBC     7.  F/E/N:  Appears well hydrated, f/u BMP, tolerating regular diet / thins continued, f/u SLP eval.     Additional rehab concerns:   Adjustment to disability:  Clinical psychology to eval and treat if appropriate  FEN: well hydrated, tolerating regular diet, nutrition consult if appropriate.  Bowel: bowel program if deemed necessary   Bladder: PVRs and/or bladder program if deemed necessary  DVT Prophylaxis: lovenox discontinued 1/9.  Progressive mobility, mechanical    GI Prophylaxis: PPI or BID zantac if appropriate  Code: Full code   Disposition: Home  ELOS:  3-4 weeks   Rehab prognosis:  Fair - Good   Follow up Appointments / Discharge planning:   F/u with PCP  Establish care with neurology/stroke provider locally  Consider evaluation of heart murmur     Slept better with prn benadryl nightly continued for him. Mild right leg pain ant/post waxing and waning, improves with tylenol and stretching, does not sound neuropathic, no edema, suspect 2/2 increasing mobility/ambulation... Cont follow.   Appears medically stable. Mod indep and doing well in therapy.  appropriate for acute rehab continued.     Patient seen and discussed with Dr. Beauchamp,  PM&R Staff Physician    Rashad Barrientos, DO  PM&R, PGY2    I, Dr. Beauchamp, also saw and examined Mac.     I have reviewed and edited the above resident note and agree.

## 2017-01-19 NOTE — PLAN OF CARE
Problem: Goal/Outcome  Goal: Goal Outcome Summary  Need to continue to focus on safety with longer distance amb and incorporate more multitasking for cognitive challenge while ambulating with NBQC.     Floor transfers - completed at CGA after education and demo.    Discussed having pt's wife, Shannan, bring in their dog to assess how pt completes mobility with dog around as balance challenge and distraction.

## 2017-01-19 NOTE — PLAN OF CARE
"Problem: Goal/Outcome  Goal: Goal Outcome Summary    Patient able to improve vocal quality with a head turn to his right. Patient education re: rationale for this. Improved vocal quality with consciously using increased vocal intensity. Patient still feels as though his cognition is \"sluggish\" and not at his baseline.         "

## 2017-01-19 NOTE — PLAN OF CARE
Problem: Goal/Outcome  Goal: Goal Outcome Summary  FOCUS/GOAL  Medical management and Mobility    ASSESSMENT, INTERVENTIONS AND CONTINUING PLAN FOR GOAL:    Pt received PRN Trazodone last night but made him a bit off. PRN Trazodone and Melatonin were both dc'd today, started on Benadryl,gave 1 capsule as 1st dose, if ineffective he can get another capsule PRN,pt understood. Up with supervision-min assist of 1 with transfers and quadcane, still with some right sided weakness.

## 2017-01-19 NOTE — PROGRESS NOTES
I have discussed the different options of AFOs. I have ordered Ottobock walk on and RCAI plastic Post leaf spring. Braces will arrive 1-20-17. Patient will trial both AFOs and see what will work best for the patient.

## 2017-01-19 NOTE — PLAN OF CARE
Problem: Goal/Outcome  Goal: Goal Outcome Summary  OT: pt. Rashi/I with dressing, sink side ADLs.  SBA/Rashi with functional transf.s during am ADLs with use of quad cane and AFO.

## 2017-01-19 NOTE — PLAN OF CARE
Problem: Goal/Outcome  Goal: Goal Outcome Summary    Pt able to complete dynamic balance challenge with item transport and retrieval with CGA-min A using QC. Plan to assess for mod (I) in room tomorrow.

## 2017-01-20 PROCEDURE — 40000187 ZZH STATISTIC PATIENT MED CONFERENCE < 30 MIN

## 2017-01-20 PROCEDURE — 40000133 ZZH STATISTIC OT WARD VISIT

## 2017-01-20 PROCEDURE — 97112 NEUROMUSCULAR REEDUCATION: CPT | Mod: GO

## 2017-01-20 PROCEDURE — 25000130 H RX MED GY IP 250 OP 259 PS 637: Performed by: PHYSICAL MEDICINE & REHABILITATION

## 2017-01-20 PROCEDURE — 97110 THERAPEUTIC EXERCISES: CPT | Mod: GP

## 2017-01-20 PROCEDURE — 40000193 ZZH STATISTIC PT WARD VISIT

## 2017-01-20 PROCEDURE — 97110 THERAPEUTIC EXERCISES: CPT | Mod: GO

## 2017-01-20 PROCEDURE — 97116 GAIT TRAINING THERAPY: CPT | Mod: GP

## 2017-01-20 PROCEDURE — 25000132 ZZH RX MED GY IP 250 OP 250 PS 637: Performed by: PHYSICAL MEDICINE & REHABILITATION

## 2017-01-20 PROCEDURE — 12800006 ZZH R&B REHAB

## 2017-01-20 PROCEDURE — 99366 TEAM CONF W/PAT BY HC PROF: CPT

## 2017-01-20 PROCEDURE — 97535 SELF CARE MNGMENT TRAINING: CPT | Mod: GO

## 2017-01-20 PROCEDURE — 40000225 ZZH STATISTIC SLP WARD VISIT

## 2017-01-20 PROCEDURE — 97032 APPL MODALITY 1+ESTIM EA 15: CPT | Mod: GP

## 2017-01-20 PROCEDURE — 97530 THERAPEUTIC ACTIVITIES: CPT | Mod: GP

## 2017-01-20 PROCEDURE — 92507 TX SP LANG VOICE COMM INDIV: CPT | Mod: GN

## 2017-01-20 RX ORDER — BACLOFEN 10 MG/1
5 TABLET ORAL 3 TIMES DAILY
Status: DISCONTINUED | OUTPATIENT
Start: 2017-01-20 | End: 2017-01-22

## 2017-01-20 RX ADMIN — ACETAMINOPHEN 650 MG: 325 TABLET, FILM COATED ORAL at 07:43

## 2017-01-20 RX ADMIN — LISINOPRIL 10 MG: 10 TABLET ORAL at 07:43

## 2017-01-20 RX ADMIN — Medication 5 MG: at 16:06

## 2017-01-20 RX ADMIN — DIPHENHYDRAMINE HYDROCHLORIDE 50 MG: 25 CAPSULE ORAL at 21:05

## 2017-01-20 RX ADMIN — Medication 5 MG: at 21:05

## 2017-01-20 RX ADMIN — ACETAMINOPHEN 650 MG: 325 TABLET, FILM COATED ORAL at 14:51

## 2017-01-20 NOTE — PROGRESS NOTES
S: patient seen today at Rehab for fitting of right posterior leaf spring AFO.    O/G: (prevent drop foot)(assist in ambulation)    A: patient seen today for fitting of an RCAI  posterior leaf spring AFO. Patient has been instructed on proper donning/doffing, use, care and break-in period.  Patient observed in a walking trial and both the patient and I are satisfied with the fit and function of the AFO at this time.     P: patient has been instructed to contact us if any issues arise.

## 2017-01-20 NOTE — PROGRESS NOTES
[facility]   Acute Rehabilitation Unit  Daily progress note    interval history  Maldonado Mendiola was seen and examined at bedside. No acute events. Slept well.  C/o leg pain worse in am.  Voiding well.  Regular BM       medications    lisinopril  10 mg Oral Daily        diphenhydrAMINE, polyethylene glycol, senna-docusate, acetaminophen, hydrALAZINE     physical exam  /74 mmHg  Pulse 79  Temp(Src) 98.9  F (37.2  C) (Oral)  Resp 12  Ht 1.829 m (6')  Wt 71.759 kg (158 lb 3.2 oz)  BMI 21.45 kg/m2  SpO2 94%  HEENT: AT/NC, PERRL  Pulmonary: CTA b/l, no wheezing, rhonchi, rales  Cardiovascular: S1/S2, 3/5 systolic ejection murmer heard best apex  Abdominal: Pos bowel sounds, soft, non tender, non distended  Extremities: moving all extremities, no edema, good pulses  MSK/neuro: 3/5 hip flexion, dorsiflexion, knee flexion right    labs  Reviewed.     assessment and plan  Maldonado Mendiola is a 51 year old right hand dominant male history untreated hypertension with new left basal ganglia stroke 2/2 HTN emergency.  Profound right sided weakness, mild aphasia, right facial droop.  [place] [facility], Admitted acute inpatient rehab 01/06/2017.        1.  Rehabilitation: begin acute inpatient rehabilitation from a multidisciplinary approach.  PT, OT and SLP  For total 3 hours daily, in addition to rehab nursing and close management of physiatrist.      2.  Left basal ganglia stroke: profound right sided weakness UE/LE, aphasia.                -BP control, goal normotensive                -continue lisinopril 20mg daily              -prn hydralazine if needed              -rehab plan above              -f/u neurology / stroke service locally (needs new pt visit or consult)    3.  Hypertension;  Recently treated for HTN emergency s/p IPH,  Has been stable on lisinipril 20mg daily which is continued.  Given history we've added prn hydralazine for BP >150/95.   Consider IM consult if becomes diff to control.      4.  Heart murmer: 3/5 systolic ejection murmer best heard at apex. Pt states informed about this during his acute hospital stay this week but before that was unaware.  Appears stable without signs of heart failure.  Denies CP, SOB, swelling.  EKG reportedly normal per chart.  IM/cards f/u if appropriate. Would recommend work-up as outpatient.     5.  Risk of electrolyte abnormality: f/u admission BMP    6.  Risk of anemia: f/u admission CBC     7.  F/E/N:  Appears well hydrated, f/u BMP, tolerating regular diet / thins continued, f/u SLP eval.     8.  Leg pain: right (affected LE), anterior and poster leg worse in am, improves throughout the day. No erythema, no edema.  Does have some unsustained clonus but not obvious increase in tone.  Spasticity vs muscle cramping.  May trial baclofen.  Cont follow.     Additional rehab concerns:   Adjustment to disability:  Clinical psychology to eval and treat if appropriate  FEN: well hydrated, tolerating regular diet, nutrition consult if appropriate.  Bowel: bowel program if deemed necessary   Bladder: PVRs and/or bladder program if deemed necessary  DVT Prophylaxis: lovenox discontinued 1/9.  Progressive mobility, mechanical    GI Prophylaxis: PPI or BID zantac if appropriate  Code: Full code   Disposition: Home  ELOS:  3-4 weeks   Rehab prognosis:  Fair - Good   Follow up Appointments / Discharge planning:   F/u with PCP  Establish care with neurology/stroke provider locally  Consider evaluation of heart murmur     Sleeping well with prn benadryl continued.  Leg pain on right, spasticity vs msk, may trail baclofen (see above).   Otherwise medically stable.  Care conference today, questions answered.  Mod indep and doing well in therapy.  appropriate for acute rehab continued.     Patient seen and discussed with Dr. Beauchamp,  PM&R Staff Physician    Rashad Barrientos, DO  PM&R, PGY2    I, Dr. Beauchamp, also saw and examined  Mac.     I have reviewed and edited the above resident note and agree.

## 2017-01-20 NOTE — PLAN OF CARE
Problem: Goal/Outcome  Goal: Goal Outcome Summary  See care conference note for documentation.

## 2017-01-20 NOTE — CARE CONFERENCE
Acute Rehab Care Conference/Team Rounds      Type: Patient Conference    Present: Dr Miguel Beauchamp, Sinai Prasad LICSW, Maldonado Mendiola patient, Iain Brown DPT, John Parker SLP, Dilma Quinonez OT, Srini Mayfield RN. Wife and two brothers.      Discharge Barriers/Treatment/Education    Rehab Diagnosis: stroke    Active Medical Co-morbidities/Prognosis: hypertension, calf pain.    Safety: not impulsive. Good insights.     Pain: Pt denies having any pain.    Medications, Skin, Tubes/Lines: Pt is on only 1 medication, and knows med. Pt's skin is intact.    Swallowing/Nutrition: No dysphagia related goals.     Bowel/Bladder: Pt is continent of bowel and bladder, LBM 1/19.    Psychosocial: Pt resides with family. Pts goal to d/c home with continued family support. Team working towards a safe d/c plan.    ADLs/IADLs: Pt has progressed with ADLs and IADLs. Pt is mod I at w/c level and is progressing towards modified independence using quad cane for adls in the room.  Pt currently is complaining of pain in the R shin and soreness in the R calf and nursing was informed. There is no warmth or tenderness to touch. Will monitor AFO to see if there is irritation that is causing the pain Educated patient on wearing R AFO during mobility for ADLs.   Pt and wife have been making modifications to their home which includes: grab bar in the shower and by the toilet, tub transfer bench, raised toilet seat. Pt will initially stay on the main level and will have things easily reachable. Pt's goals are to get complete his morning routine, make simple/cold meals and to help wife out as much as possible. It is recommended that patient receive outpatient Occupational Therapy if it is feasible but otherwise start with home health. No additional barriers noted for progress or discharge.     Mobility: Pt progressing very well with mobility - working on R hip and knee muscle coordination during stance and swing phases of gait to improve stability  "and controlled mobility, difficulty with multitasking during gait. Demo sup<>sit at I, sit<>stand with NBQC + R AFO at SBA, ambulating 175+ ft with L NBQC + R AFO at close SBA, and navigating 12x6\" steps with single rail at CGA. Will continue to progress independence with gait and stairs prior to d/c home. Have been including pt's wife, Shannan, in sessions for education on safe guarding techniques. She has demonstrated understanding. Recommend R AFO at d/c, currently awaiting orthotics to deliver today. Owns NBQC. Recommend OP [facility] at d/c at [facility].    Cognition/Language: Patient able to complete everyday tasks from cognitive standpoint with minimal difficulties. Deficits become evident with just higher level cognition with slower processing. Becomes worse with increased distractions and time constrictions. Patient is fully intelligible but notable dysphonia that improves with increased breath support and/or head turn to his right. Ongoing speech therapy upon discharge, likely at voice specialty clinic.    Community Re-Entry: will require supervision ambulating limited community distances (~300 ft) with NBQC.    Transportation: will require assist at d/c from family.    Decision maker: self    Plan of Care and goals reviewed and updated.    Discharge Plan/Recommendations    Fall Precautions: continue    Patient/Family input to goals: OK with this plan. Some questions on what to expect for outpatient therapy. Have already ordered tub bench, cane and other equipment.      Overall plan for the patient: pleased with overall progress. Arm with some emerging shoulder but not more distal movement yet. Walking for sure improving. Need to master stairs more thoroughly. Will establish good HEP. Probably won't need wheelchair for d/c given walking improvements. Looking at needing approximately until Tue 1/24. Plan ongoing outpatient PT, OT, and at least for short term SLP.      Utilization Review and " Continued Stay Justification    Medical Necessity Criteria:    For any criteria that is not met, please document reason and plan for discharge, transfer, or modification of plan of care to address.    Requires intensive rehabilitation program to treat functional deficits?: Yes  Requires 3x per week or greater involvement of rehabilitation physician to oversee rehabilitation program?: Yes  Requires rehabilitation nursing interventions?: Yes  Patient is making functional progress?: Yes  There is a potential for additional functional progress? Yes  Patient is participating in therapy 3 hours per day a minimum of 5 days per week or 15 hours per week in 7 day period?:Yes  Has discharge needs that require coordinated discharge planning approach?:Yes    Final Physician Sign off    Statement of Approval: I agree with all the recommendations detailed in this document.      Patient Goals  1. Patient Goal: Social Work Focus: Patient will be involved in discharge planning.  2. Patient Goal: Social Work Focus: Patient will receive appropriate services and resources.       OT target date for goal attainment: 01/21/17  OT Frequency: daily   OT goal: hygiene/grooming: independent  OT goal: upper body dressing: Independent  OT goal: lower body dressing: Independent  OT goal: upper body bathing: Supervision/stand-by assist  OT goal: lower body bathing: Supervision/stand-by assistOT Goal: transfer: Modified independent, with assistive device (walk in shower transfer)  OT goal: toilet transfer/toileting: Modified independent, toilet transfer, cleaning and garment management  OT goal: meal preparation: Modified independent, with simple meal preparation  OT goal: cognitive: Patient/caregiver will verbalize understanding of cognitive assessment results/recommendations as needed for safe discharge planning  OT goal 1: I with PANDA HEP to improve strength and coordination for improving function for ADL/IADL and functional mobility  OT  goal 2: Pt will independently manage and position his RUE during functional mobility and ADL/IADL in terms of positoning and protecting from injury.     PT target date for goal attainment: 01/24/17  PT Frequency: Daily x 60 min  PT goal: bed mobility:  (MET)  PT goal: transfers: Modified independent, Assistive device  PT goal: gait: Modified independent, 150 feet, Quad cane  PT goal: stairs: Modified independent, Greater than 10 stairs, Rail on left, Rail on both sides, Assistive device  PT goal: perform aerobic activity with stable cardiovascular response: 10 minutes, continuous activity  PT goal 1:  (MET)  PT Goal 2: Pt will be indep with LE strengthening HEp  PT Goal 3: Completed floor transfer, furniture assist, at SBA after education (MET)  PT Goal 4: Pt will be SBA for car transfer     SLP target date for goal attainment: 01/20/17  SLP Frequency: daily  SLP goal 1: Pt will independently utilize dysarthria strategies to improve speech intelligibility in connected speech to 95% accuracy  SLP goal 2: Pt will independently utilize word retrieval strategies to improve verbal fluency/word retrieval to 90% accuracy in complex verbal expression tasks and convesation  SLP goal 3: Pt will independently utilized compensatory memory strategies to increase recent recall during complex tasks with 90% accuracy  SLP goal 4: Pt will complete complex problem solving and reasoning tasks with 90% accuracy     Patient/Family Goal: Medication Management: Pt has only one medication, no indication for MAP at this time.   Goal: Safety Management: Pt uses the nurs call light appropriately when needed.

## 2017-01-20 NOTE — PLAN OF CARE
Problem: Goal/Outcome  Goal: Goal Outcome Summary  Outcome: Improving  Patient's BP remained stable this shift.  Denied any pain in leg this shift.  Continent of bowel and bladder.  Patient is progressing and will be assessed for independence in room.

## 2017-01-20 NOTE — PLAN OF CARE
Problem: Goal/Outcome  Goal: Goal Outcome Summary  Skilled set up on :  Pt dependent for proper placement of electrodes on R quads, hams, tib ant, and gastroc for optimum muscle contraction, safe positioning on w/c within frame and cushion for prevention of skin breakdown, feet secured to foot pedals, w/c secured to  w/ Q-straints.  Passive motion assessed to ensure proper positioning.  Determined appropriate FES parameters for each muscle group based off of strong tetanic response of muscle test.    Pt performed 15 minutes of active FES ergometry with 0-100% stimulation and 15 min passive ergometry applied to above muscles at 35 rpm with 0.5 nm resistance.  This PT adjusted e-stim and cycling parameters in real-time to ensure palpable muscle contractions throughout session.  Please see [email or web address] for further details on patient's stimulation parameters and ergometry outcomes. Patient ID: [id number] PIN: [id number]    Continuing passive ergometry with ESTIM to assist in coordinated initiation of RLE musculature during gait cycle (stance and swing phases).

## 2017-01-20 NOTE — PLAN OF CARE
"Problem: Goal/Outcome  Goal: Goal Outcome Summary  FOCUS/GOAL  Discharge planning    ASSESSMENT, INTERVENTIONS AND CONTINUING PLAN FOR GOAL:  Care Conference held this morning. Plan is for discharge next Tuesday with out pt therapies.  Pt is very concerned about the pain and \"puffy\" area on his right lower leg. PMR and PMR Resident paged.            "

## 2017-01-20 NOTE — PLAN OF CARE
Problem: Goal/Outcome  Goal: Goal Outcome Summary    Planning for ongoing therapy upon facility discharge for higher level cognition and for voice therapy. Patient having lots of questions re: stroke recovery. This date, able to complete higher level verbal expression task WFL but when made more aware of time constraints, patient having increased difficulties being able to complete.

## 2017-01-21 PROCEDURE — 40000193 ZZH STATISTIC PT WARD VISIT

## 2017-01-21 PROCEDURE — 97535 SELF CARE MNGMENT TRAINING: CPT | Mod: GO | Performed by: OCCUPATIONAL THERAPIST

## 2017-01-21 PROCEDURE — 12800006 ZZH R&B REHAB

## 2017-01-21 PROCEDURE — 97532 ZZHC SP COGNITIVE SKILLS EA 15 MIN: CPT | Mod: GN | Performed by: SPEECH-LANGUAGE PATHOLOGIST

## 2017-01-21 PROCEDURE — 25000132 ZZH RX MED GY IP 250 OP 250 PS 637: Performed by: PHYSICAL MEDICINE & REHABILITATION

## 2017-01-21 PROCEDURE — 97530 THERAPEUTIC ACTIVITIES: CPT | Mod: GP

## 2017-01-21 PROCEDURE — 97112 NEUROMUSCULAR REEDUCATION: CPT | Mod: GP

## 2017-01-21 PROCEDURE — 97112 NEUROMUSCULAR REEDUCATION: CPT | Mod: GO | Performed by: OCCUPATIONAL THERAPIST

## 2017-01-21 PROCEDURE — 40000225 ZZH STATISTIC SLP WARD VISIT: Performed by: SPEECH-LANGUAGE PATHOLOGIST

## 2017-01-21 PROCEDURE — 25000130 H RX MED GY IP 250 OP 259 PS 637: Performed by: PHYSICAL MEDICINE & REHABILITATION

## 2017-01-21 PROCEDURE — 40000133 ZZH STATISTIC OT WARD VISIT: Performed by: OCCUPATIONAL THERAPIST

## 2017-01-21 RX ADMIN — LISINOPRIL 10 MG: 10 TABLET ORAL at 08:16

## 2017-01-21 RX ADMIN — ACETAMINOPHEN 650 MG: 325 TABLET, FILM COATED ORAL at 08:16

## 2017-01-21 RX ADMIN — Medication 5 MG: at 08:17

## 2017-01-21 RX ADMIN — ACETAMINOPHEN 650 MG: 325 TABLET, FILM COATED ORAL at 00:38

## 2017-01-21 RX ADMIN — Medication 5 MG: at 21:02

## 2017-01-21 RX ADMIN — DIPHENHYDRAMINE HYDROCHLORIDE 50 MG: 25 CAPSULE ORAL at 20:28

## 2017-01-21 RX ADMIN — Medication 5 MG: at 14:57

## 2017-01-21 NOTE — PLAN OF CARE
Problem: Goal/Outcome  Goal: Goal Outcome Summary  FOCUS/GOAL  Medication management    ASSESSMENT, INTERVENTIONS AND CONTINUING PLAN FOR GOAL:  Continent of BM this morning, no issues with constipation. Flat affect noted, MD aware. Pt agreed to take Baclofen and try warm packs on his lower right leg with some relief.

## 2017-01-21 NOTE — PLAN OF CARE
Problem: Goal/Outcome  Goal: Goal Outcome Summary  Pt continues to make steady progress in sptx: is using dysarthria strategies independently during structured tongue twister tasks and in conversation. Is at 100% accuracy with completing complex reasoning puzzles independently but needing increased time to complete at times and for processing info. Pt is using strategies of 1st letter cueing to aid in word retrieval. Pt to d/c to home on Tues- recommending continued OP sptx following d/c

## 2017-01-21 NOTE — PROGRESS NOTES
[facility]   Acute Rehabilitation Unit  Daily progress note    interval history  Maldonado Mendiola was seen and examined at bedside. No acute events. Ongoing right leg pain.  Voiding well.  Regular BM       medications    baclofen  5 mg Oral TID     lisinopril  10 mg Oral Daily        diphenhydrAMINE, polyethylene glycol, senna-docusate, acetaminophen, hydrALAZINE     physical exam  /82 mmHg  Pulse 79  Temp(Src) 96.8  F (36  C) (Oral)  Resp 16  Ht 1.829 m (6')  Wt 71.759 kg (158 lb 3.2 oz)  BMI 21.45 kg/m2  SpO2 93%  HEENT: AT/NC, PERRL  Pulmonary: CTA b/l, no wheezing, rhonchi, rales  Cardiovascular: S1/S2, 3/5 systolic ejection murmer heard best apex  Abdominal: Pos bowel sounds, soft, non tender, non distended  Extremities: moving all extremities, no edema, good pulses  MSK/neuro: 3/5 hip flexion, dorsiflexion, knee flexion right    labs  Reviewed.     assessment and plan  Maldonado Mendiola is a 51 year old right hand dominant male history untreated hypertension with new left basal ganglia stroke 2/2 HTN emergency.  Profound right sided weakness, mild aphasia, right facial droop.  [place], Admitted acute inpatient rehab 01/06/2017.        1.  Rehabilitation: begin acute inpatient rehabilitation from a multidisciplinary approach.  PT, OT and SLP  For total 3 hours daily, in addition to rehab nursing and close management of physiatrist.      2.  Left basal ganglia stroke: profound right sided weakness UE/LE, aphasia.                -BP control, goal normotensive                -continue lisinopril 20mg daily              -prn hydralazine if needed              -rehab plan above              -f/u neurology / stroke service locally (needs new pt visit or consult)    3.  Hypertension;  Recently treated for HTN emergency s/p IPH,  Has been stable on lisinipril 20mg daily which is continued.  Given history we've added prn hydralazine for BP  >150/95.  Consider IM consult if becomes diff to control.      4.  Heart murmer: 3/5 systolic ejection murmer best heard at apex. Pt states informed about this during his acute hospital stay this week but before that was unaware.  Appears stable without signs of heart failure.  Denies CP, SOB, swelling.  EKG reportedly normal per chart.  IM/cards f/u if appropriate. Would recommend work-up as outpatient.     5.  Risk of electrolyte abnormality: f/u admission BMP    6.  Risk of anemia: f/u admission CBC     7.  F/E/N:  Appears well hydrated, f/u BMP, tolerating regular diet / thins continued, f/u SLP eval.     8.  Leg pain: right (affected LE), anterior and poster leg worse in am, improves throughout the day. No erythema, no edema.  Does have some unsustained clonus but not obvious increase in tone.  Spasticity vs muscle cramping.  May trial baclofen.  Cont follow.     Additional rehab concerns:   Adjustment to disability:  Clinical psychology to eval and treat if appropriate  FEN: well hydrated, tolerating regular diet, nutrition consult if appropriate.  Bowel: bowel program if deemed necessary   Bladder: PVRs and/or bladder program if deemed necessary  DVT Prophylaxis: lovenox discontinued 1/9.  Progressive mobility, mechanical    GI Prophylaxis: PPI or BID zantac if appropriate  Code: Full code   Disposition: Home  ELOS:  3-4 weeks   Rehab prognosis:  Fair - Good   Follow up Appointments / Discharge planning:   F/u with PCP  Establish care with neurology/stroke provider locally  Consider evaluation of heart murmur     Sleeping well with prn benadryl continued.  Leg pain on right described as muscle spasms, suspect spasticity, trial of baclofen. Admits to some change in mood, little discouraged but stable, discussed CPT/psych consult vs medical management but would like to hold off for now, believes getting home with be therapeutic enough, anticipate d/c early next week.   Otherwise medically stable. Mod indep with  cane.  appropriate for acute rehab continued.     Patient seen and discussed with Dr. Beauchamp,  PM&R Staff Physician    Rashad Barrientos, DO  PM&R, PGY2    I, Dr. Beauchamp, also saw and examined Mac.     I have reviewed and edited the above resident note and agree.

## 2017-01-21 NOTE — PLAN OF CARE
Problem: Goal/Outcome  Goal: Goal Outcome Summary  Outcome: Improving  FOCUS/GOAL  Pain management and Mobility    ASSESSMENT, INTERVENTIONS AND CONTINUING PLAN FOR GOAL:  Pt had difficulty falling asleep tonight. He stated he was concerned about 0600 Baclofen dose and wished not to take medication. He stated it was his understanding that baclofen was to be PRN, not scheduled. Pt also c/o pain and muscle spasms in L calf. Pt wished for errol hose to be applied. Nurse also gave pt PRN Tylenol, and wrapped L LE in warm blanket. Pt was able to fall asleep, and has appeared to be sleeping since. 0600 Baclofen dose to be held, and pt stated he would speak to MD about drug this AM.

## 2017-01-21 NOTE — PLAN OF CARE
Problem: Goal/Outcome  Goal: Goal Outcome Summary  PT: Pt making good progress towards goals, today's session had focus on R LE weight bearing and activity facilitating weight shifts onto R side.

## 2017-01-21 NOTE — PLAN OF CARE
Problem: Goal/Outcome  Goal: Goal Outcome Summary  FOCUS/GOAL  Medication management, Pain management and Safety management    ASSESSMENT, INTERVENTIONS AND CONTINUING PLAN FOR GOAL:  Pt alert and oriented, now independent in room with cane and AFO or w/c. Denied pain this shift but states when he is up he has pain in his right calf/leg. Ankle is swollen. Wearing TEDS to RLE and received new order of baclofen as ordered. Requested benadryl for sleep. Continent of bowel and bladder last BM 1/19. Plan to d/c Tues. Will continue to monitor.

## 2017-01-22 PROCEDURE — 97110 THERAPEUTIC EXERCISES: CPT | Mod: GP | Performed by: STUDENT IN AN ORGANIZED HEALTH CARE EDUCATION/TRAINING PROGRAM

## 2017-01-22 PROCEDURE — 40000133 ZZH STATISTIC OT WARD VISIT: Performed by: OCCUPATIONAL THERAPIST

## 2017-01-22 PROCEDURE — 40000193 ZZH STATISTIC PT WARD VISIT: Performed by: STUDENT IN AN ORGANIZED HEALTH CARE EDUCATION/TRAINING PROGRAM

## 2017-01-22 PROCEDURE — 40000225 ZZH STATISTIC SLP WARD VISIT: Performed by: SPEECH-LANGUAGE PATHOLOGIST

## 2017-01-22 PROCEDURE — 12800006 ZZH R&B REHAB

## 2017-01-22 PROCEDURE — 97032 APPL MODALITY 1+ESTIM EA 15: CPT | Mod: GP | Performed by: STUDENT IN AN ORGANIZED HEALTH CARE EDUCATION/TRAINING PROGRAM

## 2017-01-22 PROCEDURE — 25000132 ZZH RX MED GY IP 250 OP 250 PS 637: Performed by: PHYSICAL MEDICINE & REHABILITATION

## 2017-01-22 PROCEDURE — 97532 ZZHC SP COGNITIVE SKILLS EA 15 MIN: CPT | Mod: GN | Performed by: SPEECH-LANGUAGE PATHOLOGIST

## 2017-01-22 PROCEDURE — 25000130 H RX MED GY IP 250 OP 259 PS 637: Performed by: PHYSICAL MEDICINE & REHABILITATION

## 2017-01-22 PROCEDURE — 97112 NEUROMUSCULAR REEDUCATION: CPT | Mod: GO | Performed by: OCCUPATIONAL THERAPIST

## 2017-01-22 PROCEDURE — 97535 SELF CARE MNGMENT TRAINING: CPT | Mod: GO | Performed by: OCCUPATIONAL THERAPIST

## 2017-01-22 RX ORDER — BACLOFEN 10 MG/1
5 TABLET ORAL 2 TIMES DAILY
Status: DISCONTINUED | OUTPATIENT
Start: 2017-01-22 | End: 2017-01-24 | Stop reason: HOSPADM

## 2017-01-22 RX ORDER — BACLOFEN 10 MG/1
10 TABLET ORAL AT BEDTIME
Status: DISCONTINUED | OUTPATIENT
Start: 2017-01-22 | End: 2017-01-24 | Stop reason: HOSPADM

## 2017-01-22 RX ADMIN — Medication 5 MG: at 14:11

## 2017-01-22 RX ADMIN — ACETAMINOPHEN 650 MG: 325 TABLET, FILM COATED ORAL at 07:44

## 2017-01-22 RX ADMIN — DIPHENHYDRAMINE HYDROCHLORIDE 50 MG: 25 CAPSULE ORAL at 20:41

## 2017-01-22 RX ADMIN — LISINOPRIL 10 MG: 10 TABLET ORAL at 07:44

## 2017-01-22 RX ADMIN — BACLOFEN 10 MG: 10 TABLET ORAL at 20:41

## 2017-01-22 RX ADMIN — Medication 5 MG: at 06:33

## 2017-01-22 NOTE — PLAN OF CARE
Problem: Goal/Outcome  Goal: Goal Outcome Summary  Outcome: No Change  FOCUS/GOAL  Bladder management and Mobility    ASSESSMENT, INTERVENTIONS AND CONTINUING PLAN FOR GOAL:  Pt has been sleeping well and is independent with bed mobility. Pt is Mod I in room, and uses BR for toileting needs. No c/o R leg pain or spasms.

## 2017-01-22 NOTE — PLAN OF CARE
Problem: Goal/Outcome  Goal: Goal Outcome Summary  Outcome: Therapy, progress toward functional goals as expected  OT: Excellent activation at shoulder girdle during heavy push/pull activities today.  Pt aware of plans for DC shower tomorrow, but strongly prefers to have his wife assist or not complete shower until he arrives home.  Pt's wife demonstrated ability to appropriately assist with his cares as needed.

## 2017-01-22 NOTE — DISCHARGE INSTRUCTIONS
Follow-Up Appointments:      You are scheduled to see primary provider, Edmond Cano PA-C  on Monday, January 30, 2017 at 2:20 pm.  Blood pressure management and refill prescriptions should go through primary.    Address  Edmond Cano PA-C                          [address] [facility]                           [place] [address]  Phone    [phone]  FAX                 [phone]        You are scheduled to see Dr. Hidalgo at [facility] on Tuesday, March 14, 2017 at 8:45 am.    Address- Dr. Hidalgo                          [facility]                          Neurology Clinic                             3rd Floor    [address] [place] [address]  Phone   [phone]        You are scheduled to see Dr. Miguel Beauchamp on Wednesday, March 15, 2017 at 8:40 am.    Address  Dr. Miguel FAY of [facility]                          Physical Medicine and Rehabilitation Clinic                          3rd Floor    [address] [place] [address]  Phone   Shivani [phone]                                                       To reduce the risk of subsequent stroke there are several important factors including optimal management of anticoagulants, blood pressure, cholesterol, diabetes and smoking abstinence.    Blood Pressure:  Keeping your blood pressures less than 130/80 has been shown to reduce risk of recurrent stroke. Recording your blood pressure and heart rate daily can help you and your providers make decisions on optimal management. You are encouraged to bring your log book with you to your primary physician.    You are currently on lisinopril to help control your blood pressure. Several lifestyle modifications have been associated with blood pressure reduction and are an important part of a comprehensive plan. These include: weight loss  "(if over-weight); a diet low in salt and cholesterol and rich in fruits and vegetables; regular aerobic physical activity and limited alcohol consumption.    Of note Mac, your blood pressures have started to drift bit lower which might allow for reduction of your lisinopril dose to 5 mg or even consider going off eventually.    Anticoagulation:  Your stroke type was bleeding (hemorrhagic) so no anticoagulation is indicated.    Diabetes:  You do not have diabetes though it is important to continue monitoring for this in the future with your primary provider.    Cholesterol:  Traditional target levels for LDL cholesterol or \"bad cholesterol\" is less than 130 however once you have had a stroke, your target LDL level is now less than 70. Additional recommendations such as increasing your HDL or \"good\" cholesterol and lowering your triglyceride level can also be important.        Smoking:  Finally one of the most important modifiable risk factors is to not smoke so don't start now Mac... This includes cigarettes, pipes, cigars, chewing tobacco and second hand smoke. Support through counseling, nicotine replacement, and oral smoking-cessation medications may all be helpful. Often people have been able to quit during their hospitalization but once returning to their familiar environment, the urges can be stronger. If this is the case, we encourage you to get support. There are numerous options, start by talking with your doctor.    "

## 2017-01-22 NOTE — PLAN OF CARE
"Problem: Goal/Outcome  Goal: Goal Outcome Summary  FOCUS/GOAL  Medication management and Pain management    ASSESSMENT, INTERVENTIONS AND CONTINUING PLAN FOR GOAL:  Pt alert and oriented. Likes to take PRN benadryl around 2030. Pt states baclofen appears to be helping but hopes to increase dose tomorrow. Continues to wear PIPE to RLE, was having spasms this evening, was dorsi and plantar flexing to stretch which helped. Some swelling to right calf and ankle. Did not want PCD. Independent in room with cane, afo or w/c. Later this evening turned call light on to talk to nurse about the spasms. He states it's almost like he thinks about it, says \"don't spasm don't spasm\" in his head, but then has the spasm. It's like a tickle and it tightens, then loosens, and has done this for hours at a time intermittently. Wrapped heated blankets around RLE. Will continue to monitor.            "

## 2017-01-22 NOTE — PLAN OF CARE
Problem: Goal/Outcome  Goal: Goal Outcome Summary  Able to recall rules of play of Qwitch following initial introduction to game.during playing game of Qwitch- pt was independent with making choice for best play but occaisonally needing increased time to make the best decision and also for the divided attention demands of the task. completed Taboo game to target complex word fidning and also reasoning- pt independent with using alternative words to give clues during game.

## 2017-01-22 NOTE — PROGRESS NOTES
[facility]   Acute Rehabilitation Unit  Daily progress note    interval history  Maldonado Mendiola was seen and examined at bedside.  No acute events. Spasms better controlled.  Voiding well.  Regular BM       medications    baclofen  5 mg Oral BID     baclofen  10 mg Oral At Bedtime     lisinopril  10 mg Oral Daily        diphenhydrAMINE, polyethylene glycol, senna-docusate, acetaminophen, hydrALAZINE     physical exam  /65 mmHg  Pulse 83  Temp(Src) 97.4  F (36.3  C) (Oral)  Resp 16  Ht 1.829 m (6')  Wt 71.759 kg (158 lb 3.2 oz)  BMI 21.45 kg/m2  SpO2 96%  HEENT: AT/NC, PERRL  Pulmonary: CTA b/l, no wheezing, rhonchi, rales  Cardiovascular: S1/S2, 3/5 systolic ejection murmer heard best apex  Abdominal: Pos bowel sounds, soft, non tender, non distended  Extremities: moving all extremities, no edema, good pulses  MSK/neuro: 3/5 hip flexion, dorsiflexion, knee flexion right    labs  Reviewed.     assessment and plan  Maldonado Mendiola is a 51 year old right hand dominant male history untreated hypertension with new left basal ganglia stroke 2/2 HTN emergency.  Profound right sided weakness, mild aphasia, right facial droop.  [place], Admitted acute inpatient rehab 01/06/2017.        1.  Rehabilitation: begin acute inpatient rehabilitation from a multidisciplinary approach.  PT, OT and SLP  For total 3 hours daily, in addition to rehab nursing and close management of physiatrist.      2.  Left basal ganglia stroke: profound right sided weakness UE/LE, aphasia.                -BP control, goal normotensive                -continue lisinopril 20mg daily              -prn hydralazine if needed              -rehab plan above              -f/u neurology / stroke service locally (needs new pt visit or consult)    3.  Hypertension;  Recently treated for HTN emergency s/p IP,  Has been stable on lisinipril 20mg daily which is continued.  Given  history we've added prn hydralazine for BP >150/95.  Consider IM consult if becomes diff to control.      4.  Heart murmer: 3/5 systolic ejection murmer best heard at apex. Pt states informed about this during his acute hospital stay this week but before that was unaware.  Appears stable without signs of heart failure.  Denies CP, SOB, swelling.  EKG reportedly normal per chart.  IM/cards f/u if appropriate. Would recommend work-up as outpatient.     5.  Risk of electrolyte abnormality: f/u admission BMP    6.  Risk of anemia: f/u admission CBC     7.  F/E/N:  Appears well hydrated, f/u BMP, tolerating regular diet / thins continued, f/u SLP eval.     8.  Leg pain: right (affected LE), anterior and poster leg worse in am, improves throughout the day. No erythema, no edema.  Does have some unsustained clonus but not obvious increase in tone.  Spasticity vs muscle cramping.  May trial baclofen.  Cont follow.     Additional rehab concerns:   Adjustment to disability:  Clinical psychology to eval and treat if appropriate  FEN: well hydrated, tolerating regular diet, nutrition consult if appropriate.  Bowel: bowel program if deemed necessary   Bladder: PVRs and/or bladder program if deemed necessary  DVT Prophylaxis: lovenox discontinued 1/9.  Progressive mobility, mechanical    GI Prophylaxis: PPI or BID zantac if appropriate  Code: Full code   Disposition: Home  ELOS:  3-4 weeks   Rehab prognosis:  Fair - Good   Follow up Appointments / Discharge planning:   F/u with PCP  Establish care with neurology/stroke provider locally  Consider evaluation of heart murmur     Leg pain on right likely 2/2 some developing spasticity improving on baclofen which was started 1/20.  Still some mild pain before bed so increasing night time dose from 5mg to 10mg, cont 5mg BID daytime dosing as well.  Tolerating well.   Stable mood today.  Otherwise medically stable. Mod indep in room. appropriate for acute rehab continued.     Patient  seen and discussed with Dr. Beauchamp,  PM&R Staff Physician    Rashad Barrientos, DO  PM&R, PGY2    I, Dr. Beauchamp, also saw and examined Mac.     I have reviewed and edited the above resident note and agree.

## 2017-01-22 NOTE — PLAN OF CARE
Problem: Goal/Outcome  Goal: Goal Outcome Summary  FOCUS/GOAL  Pain management    ASSESSMENT, INTERVENTIONS AND CONTINUING PLAN FOR GOAL:  HS Baclofen dose increased will start tonight per MD. Anticipating discharge Tuesday.

## 2017-01-23 PROCEDURE — 97530 THERAPEUTIC ACTIVITIES: CPT | Mod: GP

## 2017-01-23 PROCEDURE — 97532 ZZHC SP COGNITIVE SKILLS EA 15 MIN: CPT | Mod: GN | Performed by: SPEECH-LANGUAGE PATHOLOGIST

## 2017-01-23 PROCEDURE — 97110 THERAPEUTIC EXERCISES: CPT | Mod: GO | Performed by: OCCUPATIONAL THERAPIST

## 2017-01-23 PROCEDURE — 97530 THERAPEUTIC ACTIVITIES: CPT | Mod: GO | Performed by: OCCUPATIONAL THERAPIST

## 2017-01-23 PROCEDURE — 40000193 ZZH STATISTIC PT WARD VISIT

## 2017-01-23 PROCEDURE — 97112 NEUROMUSCULAR REEDUCATION: CPT | Mod: GP | Performed by: PHYSICAL THERAPIST

## 2017-01-23 PROCEDURE — 40000133 ZZH STATISTIC OT WARD VISIT: Performed by: OCCUPATIONAL THERAPIST

## 2017-01-23 PROCEDURE — 40000133 ZZH STATISTIC OT WARD VISIT: Performed by: STUDENT IN AN ORGANIZED HEALTH CARE EDUCATION/TRAINING PROGRAM

## 2017-01-23 PROCEDURE — 97535 SELF CARE MNGMENT TRAINING: CPT | Mod: GO | Performed by: STUDENT IN AN ORGANIZED HEALTH CARE EDUCATION/TRAINING PROGRAM

## 2017-01-23 PROCEDURE — 25000132 ZZH RX MED GY IP 250 OP 250 PS 637: Performed by: PHYSICAL MEDICINE & REHABILITATION

## 2017-01-23 PROCEDURE — 25000130 H RX MED GY IP 250 OP 259 PS 637: Performed by: PHYSICAL MEDICINE & REHABILITATION

## 2017-01-23 PROCEDURE — 97110 THERAPEUTIC EXERCISES: CPT | Mod: GP | Performed by: PHYSICAL THERAPIST

## 2017-01-23 PROCEDURE — 40000225 ZZH STATISTIC SLP WARD VISIT: Performed by: SPEECH-LANGUAGE PATHOLOGIST

## 2017-01-23 PROCEDURE — 12800006 ZZH R&B REHAB

## 2017-01-23 PROCEDURE — 97116 GAIT TRAINING THERAPY: CPT | Mod: GP | Performed by: PHYSICAL THERAPIST

## 2017-01-23 PROCEDURE — 40000193 ZZH STATISTIC PT WARD VISIT: Performed by: PHYSICAL THERAPIST

## 2017-01-23 PROCEDURE — 97116 GAIT TRAINING THERAPY: CPT | Mod: GP

## 2017-01-23 RX ADMIN — LISINOPRIL 10 MG: 10 TABLET ORAL at 07:56

## 2017-01-23 RX ADMIN — DIPHENHYDRAMINE HYDROCHLORIDE 50 MG: 25 CAPSULE ORAL at 20:54

## 2017-01-23 RX ADMIN — Medication 5 MG: at 06:32

## 2017-01-23 RX ADMIN — BACLOFEN 10 MG: 10 TABLET ORAL at 20:54

## 2017-01-23 RX ADMIN — Medication 5 MG: at 13:25

## 2017-01-23 NOTE — PROGRESS NOTES
[facility]   Acute Rehabilitation Unit  Daily progress note    interval history  Maldonado Mendiola was seen and examined at bedside.  No acute events. Spasms better controlled.  Voiding well.  Regular BM       medications    baclofen  5 mg Oral BID     baclofen  10 mg Oral At Bedtime     lisinopril  10 mg Oral Daily        diphenhydrAMINE, polyethylene glycol, senna-docusate, acetaminophen, hydrALAZINE     physical exam  /74 mmHg  Pulse 70  Temp(Src) 97.3  F (36.3  C) (Oral)  Resp 16  Ht 1.829 m (6')  Wt 71.759 kg (158 lb 3.2 oz)  BMI 21.45 kg/m2  SpO2 96%  HEENT: AT/NC, PERRL  Extremities: moving all extremities, no edema, good pulses    labs  Reviewed.     assessment and plan  Maldonado Mendiola is a 51 year old right hand dominant male history untreated hypertension with new left basal ganglia stroke 2/2 HTN emergency.  Profound right sided weakness, mild aphasia, right facial droop.  [place], Admitted acute inpatient rehab 01/06/2017.        1.  Rehabilitation: begin acute inpatient rehabilitation from a multidisciplinary approach.  PT, OT and SLP  For total 3 hours daily, in addition to rehab nursing and close management of physiatrist.      2.  Left basal ganglia stroke: profound right sided weakness UE/LE, aphasia.                -BP control, goal normotensive                -continue lisinopril 20mg daily              -prn hydralazine if needed              -rehab plan above              -f/u neurology / stroke service locally (needs new pt visit or consult)    3.  Hypertension;  Recently treated for HTN emergency s/p [facility],  Has been stable on lisinipril 20mg daily which is continued.  Given history we've added prn hydralazine for BP >150/95.  Consider IM consult if becomes diff to control.      4.  Heart murmer: 3/5 systolic ejection murmer best heard at apex. Pt states informed about this during his acute hospital stay this week  but before that was unaware.  Appears stable without signs of heart failure.  Denies CP, SOB, swelling.  EKG reportedly normal per chart.  IM/cards f/u if appropriate. Would recommend work-up as outpatient.     5.  Risk of electrolyte abnormality: f/u admission BMP    6.  Risk of anemia: f/u admission CBC     7.  F/E/N:  Appears well hydrated, f/u BMP, tolerating regular diet / thins continued, f/u SLP eval.     8.  Leg pain: right (affected LE), anterior and poster leg worse in am, improves throughout the day. No erythema, no edema.  Does have some unsustained clonus but not obvious increase in tone.  Spasticity vs muscle cramping.  May trial baclofen.  Cont follow.     9.  Mood:  Some adjustment to disability concerns but appears to be handling well.  Refuses psychology or medical management. No thoughts harming self/others.  Normal mood, affect, thought process.     Additional rehab concerns:   Adjustment to disability:  Clinical psychology to eval and treat if appropriate  FEN: well hydrated, tolerating regular diet, nutrition consult if appropriate.  Bowel: bowel program if deemed necessary   Bladder: PVRs and/or bladder program if deemed necessary  DVT Prophylaxis: lovenox discontinued 1/9.  Progressive mobility, mechanical    GI Prophylaxis: PPI or BID zantac if appropriate  Code: Full code   Disposition: Home  ELOS:  3-4 weeks   Rehab prognosis:  Fair - Good   Follow up Appointments / Discharge planning:   F/u with PCP  Establish care with neurology/stroke provider locally  Consider evaluation of heart murmur     Right leg spasticity well controlled on current regimen baclofen which he's tolerating well, continued.  Stable mood.  medically stable. Mod indep in room. appropriate for acute rehab continued.     Patient seen and discussed with Dr. Beauchamp,  PM&R Staff Physician    Rashad Barrientos, DO  PM&R, PGY2    I, Dr. Beauchamp, also saw and examined Mac.     I have reviewed and edited the above resident note  and agree.

## 2017-01-23 NOTE — PLAN OF CARE
Problem: Goal/Outcome  Goal: Goal Outcome Summary  OT: I day shower and ADL completed with wife present. Pt is on track to DC tomorrow with OP OT at [facility].

## 2017-01-23 NOTE — PLAN OF CARE
Problem: Goal/Outcome  Goal: Goal Outcome Summary  Outcome: No Change  FOCUS/GOAL  Medical management    ASSESSMENT, INTERVENTIONS AND CONTINUING PLAN FOR GOAL:    Patient has been sleeping between cares. Offered no statements of pain or discomfort. Continue plan of care.

## 2017-01-23 NOTE — PLAN OF CARE
Problem: Goal/Outcome  Goal: Goal Outcome Summary  Mild incresed difficulty 1 x during game of Qwitch with recall  of rules of play yesterday but recalled throughout the game. Mild incresed difficulty 1 x during game of Qwitch with recall  of rules of play yesterday but recalled throughout the game. Practiced dysarthria strateiges- reading tongue twisters out loud- pt noting he is having idifficulty with /sh/, /s/ and /z/ transitions-- so pt is moving through these words slower Pt is 100% intellgibile but just with some mild imprecision

## 2017-01-23 NOTE — PLAN OF CARE
Problem: Goal/Outcome  Goal: Goal Outcome Summary  D/C to home tomorrow as pt appears safe and Mod-I with all transfers and locomotion. Wife, Shannan, is well aware of safety concerns and has participated in stair, curb, floor, and car transfers and demonstrates and verbalizes understanding. Will review HEP and address any additional caregiver/pt concerns prior to D/C.

## 2017-01-23 NOTE — PLAN OF CARE
Problem: Goal/Outcome  Goal: Goal Outcome Summary  FOCUS/GOAL  Medication management and Pain management    ASSESSMENT, INTERVENTIONS AND CONTINUING PLAN FOR GOAL:  Pt denied pain or any leg spasms this shift. Received dose of Baclofen at hs and requested 2 Benadryl. Pt requests to not be waken up for vitals. Asked if wife can aid in shower tomorrow. Plan is to dc Tuesday.

## 2017-01-23 NOTE — PLAN OF CARE
Problem: Goal/Outcome  Goal: Goal Outcome Summary  Postural Assessment for Stroke Scale (PASS)  Maintaining posture -   1) Sitting without support - 3  2) Standing with support (feet position free) - 3  3) Standing without support (feet position free) - 3  4) Standing on nonparetic leg - 3  5) Standing on paretic leg - 0    Changing posture -  6) Rolling supine -> affected side - 3  7) Rolling supine -> unaffected side - 3  8) Sup->sitting edge of bed - 3  9) Sitting edge of bed -> sup - 3  10) Sit->stand without support - 3  11) Stand->sit without support - 3  12) Standing,  pencil from floor without support - 3    Total Score - 33/36    The PASS assesses a patient's ability to change and maintain posture during different balance activities. It is not associated with falls risk, but is used to track progress with the patient's ability to control their posture and balance throughout the course of the patient's admission.    Assessment: Pt is still unable to perform SLS on paretic leg, as he is unable to assume and maintain position.     Total Time: 10

## 2017-01-23 NOTE — PLAN OF CARE
Problem: Goal/Outcome  Goal: Goal Outcome Summary  FOCUS/GOAL  Discharge planning    ASSESSMENT, INTERVENTIONS AND CONTINUING PLAN FOR GOAL:  Mac declined need for Tylenol this morning stating the Baclofen is really working well. Pt showered this morning with the assistance of his wife's help during OT session. No skin issues noted. Anticipating discharge home tomorrow with family.

## 2017-01-24 VITALS
OXYGEN SATURATION: 98 % | RESPIRATION RATE: 16 BRPM | HEART RATE: 86 BPM | DIASTOLIC BLOOD PRESSURE: 58 MMHG | SYSTOLIC BLOOD PRESSURE: 108 MMHG | WEIGHT: 158.2 LBS | BODY MASS INDEX: 21.43 KG/M2 | HEIGHT: 72 IN | TEMPERATURE: 98.6 F

## 2017-01-24 PROBLEM — K59.01 SLOW TRANSIT CONSTIPATION: Status: ACTIVE | Noted: 2017-01-24

## 2017-01-24 PROBLEM — F51.02 ADJUSTMENT INSOMNIA: Status: ACTIVE | Noted: 2017-01-24

## 2017-01-24 PROCEDURE — 97535 SELF CARE MNGMENT TRAINING: CPT | Mod: GO | Performed by: STUDENT IN AN ORGANIZED HEALTH CARE EDUCATION/TRAINING PROGRAM

## 2017-01-24 PROCEDURE — 25000132 ZZH RX MED GY IP 250 OP 250 PS 637: Performed by: PHYSICAL MEDICINE & REHABILITATION

## 2017-01-24 PROCEDURE — 40000133 ZZH STATISTIC OT WARD VISIT: Performed by: STUDENT IN AN ORGANIZED HEALTH CARE EDUCATION/TRAINING PROGRAM

## 2017-01-24 PROCEDURE — 40000225 ZZH STATISTIC SLP WARD VISIT: Performed by: SPEECH-LANGUAGE PATHOLOGIST

## 2017-01-24 PROCEDURE — 97532 ZZHC SP COGNITIVE SKILLS EA 15 MIN: CPT | Mod: GN | Performed by: SPEECH-LANGUAGE PATHOLOGIST

## 2017-01-24 RX ORDER — LISINOPRIL 10 MG/1
10 TABLET ORAL DAILY
Qty: 30 TABLET | Refills: 0 | Status: SHIPPED | OUTPATIENT
Start: 2017-01-24 | End: 2017-01-24

## 2017-01-24 RX ORDER — BACLOFEN 10 MG/1
5-10 TABLET ORAL 3 TIMES DAILY
Qty: 90 TABLET | Refills: 3 | Status: SHIPPED | OUTPATIENT
Start: 2017-01-24 | End: 2017-05-17

## 2017-01-24 RX ORDER — LISINOPRIL 10 MG/1
10 TABLET ORAL DAILY
Qty: 30 TABLET | Refills: 0 | Status: SHIPPED | OUTPATIENT
Start: 2017-01-24 | End: 2017-01-30

## 2017-01-24 RX ORDER — AMOXICILLIN 250 MG
1-2 CAPSULE ORAL DAILY PRN
COMMUNITY
Start: 2017-01-24 | End: 2017-02-22

## 2017-01-24 RX ORDER — BACLOFEN 10 MG/1
5-10 TABLET ORAL 3 TIMES DAILY
Qty: 90 TABLET | Refills: 3 | Status: SHIPPED | OUTPATIENT
Start: 2017-01-24 | End: 2017-01-24

## 2017-01-24 RX ORDER — DIPHENHYDRAMINE HCL 25 MG
25-50 CAPSULE ORAL
COMMUNITY
Start: 2017-01-24 | End: 2017-03-27

## 2017-01-24 RX ADMIN — SENNOSIDES AND DOCUSATE SODIUM 2 TABLET: 8.6; 5 TABLET ORAL at 06:43

## 2017-01-24 RX ADMIN — Medication 5 MG: at 06:39

## 2017-01-24 RX ADMIN — LISINOPRIL 10 MG: 10 TABLET ORAL at 07:59

## 2017-01-24 NOTE — PLAN OF CARE
Problem: Goal/Outcome  Goal: Goal Outcome Summary  Occupational Therapy Discharge Summary    Reason for therapy discharge:    All goals and outcomes met, no further needs identified.    Progress towards therapy goal(s). See goals on Care Plan in [facility] electronic health record for goal details.  Goals met    Therapy recommendation(s):    Continued therapy is recommended.  Rationale/Recommendations:  Pt has met his goals for OT, recommend continued OP OT to work on return to previous rolea dn UE function..

## 2017-01-24 NOTE — PLAN OF CARE
Problem: Goal/Outcome  Goal: Goal Outcome Summary  Pt is 100% intellgibile and is able to express complex thoguhts-- some occasional mild word retrieval difficulty but pt is often able to use strategies to retreive the intended word. Pt continues to practice reading out loud and reading tongue twiters to practice use of dysarthriat strategies.Completed a complex deductive puzzle - needing mildly increased time and assistance 1 time as pt missed a detail but thenw as able to solve the rest independently. completed a recall task- recall of 4 words and then answering a question related to 1 of the words - 100% accurate- 5/5. Pt d/cing to home today - initially was recommending OP spt, but 2/2 pt's insurance coverage - only a limited amount of total tx visits between OT,PT and speech so pt would like to focus more on OT and PT initially and plans to continue with Lumosity( for cognition) and dysarthria tasks that had been given to him on his own for now.     Speech Language Therapy Discharge Summary    Reason for therapy discharge:    Discharged to home.    Progress towards therapy goal(s). See goals on Care Plan in [facility] electronic health record for goal details.  Goals partially met.  Barriers to achieving goals:   discharge from facility.    Therapy recommendation(s):    No further therapy is recommended. For now- will hold on additional sptx- see note above under summary

## 2017-01-24 NOTE — DISCHARGE SUMMARY
[facility]   Acute Rehabilitation Unit  Discharge summary     Date of Admission: 1/6/2017  Date of Discharge: 1/24/2017  Disposition: home  Primary Care Physician: Edmond Cano  Attending physician: Dr. Miguel Beauchamp, PM&R       discharge diagnosis  Left basal ganglia hemorrhagic stroke  HTN  Mild spasticity  Adjustment disorder, mild depressed mood  Mild pseudobulbar affect lability    brief summary  Maldonado Mendiola is a 51 year old right hand dominant male history untreated hypertension with new left basal ganglia stroke 2/2 HTN emergency.  Profound right sided weakness, mild aphasia, right facial droop.  [place] of [place] hospital, Admitted acute inpatient rehab 01/06/2017.      HISTORY OF PRESENT ILLNESS  Maldonado Mendiola is a 51 year old right hand dominant male history untreated hypertension presents from neurology service at [place] after suffering left basal ganglia hemorrhagic stroke. Patient was on vacation with the family on [facility].  On 12/29/16 he experienced sudden onset right sided weakness and word finding issues. Wife called 911, EMS transported to ER with /99.  Imaging revealed hemorrhagic stroke. Initially treated in ICU with a nicardipine drip.    HOSPITAL COURSE  Patient was admitted to the  acute inpatient rehabilitation unit on 1/6/2017.  The patient underwent a full functional evaluation and individualized rehabilitation program.  He received physical therapy, occupational therapy and speech therapy.  See rehab course below.  From a medical standpoint the patient remained largely stable.  From stroke perspective continued making slow gains in therapy without evidence of exacerbation.  Continue good secondary prevention.  Thought to be secondary to HTN emergency.  BP remained well controlled on lisinpiril with is continued at discharge.  Patient has 3/5 systolic ejection murmer upon arrival,  "not clear if this is new but pt stated someone noticed this during his acute care stay, remained stable from cardiopulm perspective during the duration of this admission without signs of heart failure.  Recommended f/u by PCP and echocardiogram if appropriate.  Patient developed some mild leg pain which was consistent with mild spasticity, improved on low dose baclofen TID which is continued. Mood remained largely stable but did express some frustration with overall progress... Recommended CBT and /or medical management during stay but declined, felt that getting home would be of great help, stable without thoughts harming self/others.  Patient remains in good spirits and is well motivated.  He maximized his time in acute inpatient rehab and was discharged home in stable condition.  He will be f/u with neurology/stroke provider at [facility], PM&R as well as his PCP as indicated in discharge instructions.  Pt will have continued therapies as an  Outpatient.     REHABILITATION COURSE  PT - Pt progressing very well with mobility - working on R hip and knee muscle coordination during stance and swing phases of gait to improve stability and controlled mobility, difficulty with multitasking during gait. Demo sup<>sit at I, sit<>stand with NBQC + R AFO at SBA, ambulating 175+ ft with L NBQC + R AFO at close SBA, and navigating 12x6\" steps with single rail at CGA. Will continue to progress independence with gait and stairs prior to d/c home. Have been including pt's wife, Shannan, in sessions for education on safe guarding techniques. She has demonstrated understanding. Recommend R AFO at d/c, currently awaiting orthotics to deliver today. Owns NBQC. Recommend OP PT at d/c at [facility].    OT - Pt has progressed with ADLs and IADLs. Pt is mod I at w/c level and is progressing towards modified independence using quad cane for adls in the room.  Pt currently is complaining of pain in the R shin and soreness in the R " calf and nursing was informed. There is no warmth or tenderness to touch. Will monitor AFO to see if there is irritation that is causing the pain Educated patient on wearing R AFO during mobility for ADLs.  Pt and wife have been making modifications to their home which includes: grab bar in the shower and by the toilet, tub transfer bench, raised toilet seat. Pt will initially stay on the main level and will have things easily reachable. Pt's goals are to get complete his morning routine, make simple/cold meals and to help wife out as much as possible. It is recommended that patient receive outpatient Occupational Therapy if it is feasible but otherwise start with home health. No additional barriers noted for progress or discharge.     SLP - Patient able to complete everyday tasks from cognitive standpoint with minimal difficulties. Deficits become evident with just higher level cognition with slower processing. Becomes worse with increased distractions and time constrictions. Patient is fully intelligible but notable dysphonia that improves with increased breath support and/or head turn to his right. Ongoing speech therapy upon discharge, likely at voice specialty clinic.    dISCHARGE MEDICATIONS  Discharge Medication List as of 1/24/2017 10:56 AM      START taking these medications    Details   diphenhydrAMINE (BENADRYL) 25 MG capsule Take 1-2 capsules (25-50 mg) by mouth nightly as needed for sleep, OTC      senna-docusate (SENOKOT-S;PERICOLACE) 8.6-50 MG per tablet Take 1-2 tablets by mouth daily as needed for constipation, OTC         CONTINUE these medications which have CHANGED    Details   lisinopril (PRINIVIL/ZESTRIL) 10 MG tablet Take 1 tablet (10 mg) by mouth daily, Disp-30 tablet, R-0, E-Prescribe      baclofen (LIORESAL) 10 MG tablet Take 0.5-1 tablets (5-10 mg) by mouth 3 times daily, Disp-90 tablet, R-3, E-Prescribe         STOP taking these medications       escitalopram (LEXAPRO) 10 MG tablet  Comments:   Reason for Stopping:         propranolol (INDERAL) 20 MG tablet Comments:   Reason for Stopping:         oxyCODONE-acetaminophen (PERCOCET) 5-325 MG per tablet Comments:   Reason for Stopping:         HYDROcodone-acetaminophen (NORCO) 5-325 MG per tablet Comments:   Reason for Stopping:         ibuprofen (ADVIL) 200 MG capsule Comments:   Reason for Stopping:             DISCHARGE INSTRUCTIONS AND FOLLOW UP    Discharge Procedure Orders  Physical Therapy Referral   Referral Type: Rehab Therapy Physical Therapy     Occupational Therapy Referral   Referral Type: Occupational Therapy     Speech Therapy Referral   Referral Type: Therapeutic Services     Reason for your hospital stay   Order Comments: Admitted to acute rehab after your stroke. Pleased with some of the initial functional recovery and ready for discharge and follow up outpatient therapies.     Activity   Order Comments: Your activity upon discharge: recommend some supervision initially on stairs until that's more thoroughly solid in performance. Giv-Lizz sling may help with comfort and shoulder position but doesn't have to be worn all of the time. No driving at this time. Outpatient occupational therapist may screen this as you move forward when may be safe though often a formal behind the wheel assessment.   Order Specific Question Answer Comments   Is discharge order? Yes      Full Code     Diet   Order Comments: Follow this diet upon discharge: Regular consistency now fine. See stroke book for healthy diet information.   Order Specific Question Answer Comments   Is discharge order? Yes       physical examination    Most recent Vital Signs:   Filed Vitals:    01/22/17 1528 01/23/17 0722 01/23/17 1646 01/24/17 0759   BP: 109/67 117/74 109/59 108/58   Pulse: 86 70  86   Temp: 97.6  F (36.4  C) 97.3  F (36.3  C) 98.2  F (36.8  C) 98.6  F (37  C)   TempSrc: Oral Oral Oral Oral   Resp: 16 16 16 16   Height:       Weight:       SpO2: 96% 96% 96%  98%     HEENT: AT/NC, PERRL  Pulmonary: CTA b/l, no wheezing, rhonchi, rales  Cardiovascular: S1/S2, 3/5 systolic ejection murmer heard best apex  Abdominal: Pos bowel sounds, soft, non tender, non distended  Extremities: moving all extremities, no edema, good pulses  MSK/neuro: 3/5 hip flexion, dorsiflexion, knee flexion right.  1 and almost 2/5 tricept, 1+2/5 shoulder abduction right. No movement in hand. Clonus right LE unsustained. No obvious increase tone.     Discharge summary was forwarded to Edomnd Cano (PCP) at the time of discharge, so as to bridge from hospital to outpatient care.     It was our pleasure to care for Maldonado Mendiola during this hospitalization. Please do not hesitate to contact me should there be questions regarding the hospital course or discharge plan.      Rashad Barrientos, DO  PM&R, PGY2    I, Dr. Beauchamp, also saw and examined Mac on day of discharge. Answered all questions with he and his wife.  Plan to f/u with me in about 6 weeks. Also has appointments with primary and neurology.   I have reviewed and edited the above resident note and agree.  Greater than 30 minutes spent on d/c.

## 2017-01-24 NOTE — PLAN OF CARE
Problem: Goal/Outcome  Goal: Goal Outcome Summary  Physical Therapy Discharge Summary    Reason for therapy discharge:    Discharged to home with outpatient therapy.    Progress towards therapy goal(s). See goals on Care Plan in [facility] electronic health record for goal details.  Goals all met with exception of Mod-I on stairs. Pt currently requires Min A to assist with balance and clearing R foot over step.  Barriers to goal include: RLE weakness and fatigue and decreased  SLS balance    Therapy recommendation(s):    Continued therapy is recommended.  Rationale/Recommendations:  OP PT at the [facility] to focus on LE strengthening, balance, muscular endurance, and gait mechanics in order to improve independence with ADLs, reduce risk of falls, and improve efficiency of gait and stairs .  Continue home exercise program.     Summary:  Pt appears safe for D/C home as he is Mod-I for all transfers and gait activities. For activities that he requires more assistance such as stairs and curb navigation, his wife Shannan demonstrates and verbalizes proper guarding and safety awareness in clearing the R foot during stepping. Pt is being discharged home with WBQC and RAFO to help with balance and R foot clearance while performing ADLs. Continued PT to address R foot clearance, LE strengthening, and balance will be beneficial to improving pt's level of independence and reducing risk of falls. OP PT request has been placed for [facility] and will deliver appointment times prior to d/c.

## 2017-01-24 NOTE — PLAN OF CARE
Problem: Goal/Outcome  Goal: Goal Outcome Summary  Outcome: No Change  FOCUS/GOAL  Discharge planning and Mobility    ASSESSMENT, INTERVENTIONS AND CONTINUING PLAN FOR GOAL:  Pt mod I in room using a quad cane. He is also using an AFO on right LE and a [employer] sling on his right UE. Pt denied any pain. Vitals stable this morning. C/o some constipation and he had already gotten prn senna-s from [facility] nurse early this morning. No results yet. Discharge instructions reviewed with pt and wife. Discharge meds given to pt. Discharged out of unit per wheelchair at 1120, accompanied by wife and assisted by NA.

## 2017-01-24 NOTE — PLAN OF CARE
Problem: Goal/Outcome  Goal: Goal Outcome Summary  Outcome: No Change  FOCUS/GOAL  Discharge planning and Mobility    ASSESSMENT, INTERVENTIONS AND CONTINUING PLAN FOR GOAL:  Pt Mod I in room with quad cane. Denied pain and spasms. Requested Benadryl at . DC tomorrow with family after 1000 therapy.

## 2017-01-25 ENCOUNTER — HOSPITAL ENCOUNTER (OUTPATIENT)
Dept: OCCUPATIONAL THERAPY | Facility: CLINIC | Age: 52
Setting detail: THERAPIES SERIES
End: 2017-01-25
Attending: PHYSICAL MEDICINE & REHABILITATION
Payer: COMMERCIAL

## 2017-01-25 DIAGNOSIS — I69.351 HEMIPARESIS AFFECTING RIGHT SIDE AS LATE EFFECT OF CEREBROVASCULAR ACCIDENT (H): ICD-10-CM

## 2017-01-25 DIAGNOSIS — I61.9 HEMORRHAGIC STROKE (H): Primary | ICD-10-CM

## 2017-01-25 PROCEDURE — 97112 NEUROMUSCULAR REEDUCATION: CPT | Mod: GO | Performed by: OCCUPATIONAL THERAPIST

## 2017-01-25 PROCEDURE — 97032 APPL MODALITY 1+ESTIM EA 15: CPT | Mod: GO | Performed by: OCCUPATIONAL THERAPIST

## 2017-01-25 PROCEDURE — 97110 THERAPEUTIC EXERCISES: CPT | Mod: GO | Performed by: OCCUPATIONAL THERAPIST

## 2017-01-25 PROCEDURE — 97167 OT EVAL HIGH COMPLEX 60 MIN: CPT | Mod: GO | Performed by: OCCUPATIONAL THERAPIST

## 2017-01-26 ENCOUNTER — HOSPITAL ENCOUNTER (OUTPATIENT)
Dept: PHYSICAL THERAPY | Facility: CLINIC | Age: 52
Setting detail: THERAPIES SERIES
End: 2017-01-26
Attending: PHYSICAL MEDICINE & REHABILITATION
Payer: COMMERCIAL

## 2017-01-26 DIAGNOSIS — I69.351 HEMIPARESIS AFFECTING RIGHT SIDE AS LATE EFFECT OF CEREBROVASCULAR ACCIDENT (H): ICD-10-CM

## 2017-01-26 DIAGNOSIS — I61.9 HEMORRHAGIC STROKE (H): Primary | ICD-10-CM

## 2017-01-26 PROCEDURE — 97161 PT EVAL LOW COMPLEX 20 MIN: CPT | Mod: GP | Performed by: PHYSICAL THERAPIST

## 2017-01-26 PROCEDURE — 97110 THERAPEUTIC EXERCISES: CPT | Mod: GP | Performed by: PHYSICAL THERAPIST

## 2017-01-26 PROCEDURE — 97116 GAIT TRAINING THERAPY: CPT | Mod: GP | Performed by: PHYSICAL THERAPIST

## 2017-01-26 PROCEDURE — 40000719 ZZHC STATISTIC PT DEPARTMENT NEURO VISIT: Performed by: PHYSICAL THERAPIST

## 2017-01-26 NOTE — IP AVS SNAPSHOT
MRN:[id number]                      After Visit Summary   1/26/2017    Maldonado Mendiola    MRN: [id number]           Visit Information        Provider Department      1/26/2017  8:30 AM Rochelle Capone, PT [facility] [place], Physical Therapy - Outpatient        Your next 10 appointments already scheduled     Jan 27, 2017  8:30 AM   Treatment 60 with Laura Johns, OT   [facility])    [address], [address]  [place] [address]   [phone]            Jan 30, 2017  2:20 PM   [facility] with Edmond Cano [facility]-C   [facility]    [address] [place] [id number]   [phone]            Jan 31, 2017  8:30 AM   Treatment 45 with Rochelle Capone, PT   [facility])    [address]  [place] [address]   [phone]            Jan 31, 2017  9:15 AM   Treatment 60 with Raghav Matute, OT   [facility])    [address]  [place] [address]   [phone]            Feb 01, 2017  8:30 AM   Treatment 60 with Laura Johns, OT   [facility])    [address]  [place] [address]   [phone]            Feb 01, 2017  9:30 AM   Treatment 45 with Rochelle Capone, PT   [facility])    [address]  [place] [address]   [phone]            Feb 03, 2017  8:00 AM   Treatment 60 with Raghav Matute, OT   [facility])     "[address]  [place] [address]   [phone]            Feb 03, 2017  9:00 AM   Treatment 60 with Rochelle Capone, PT   [facility] [place], Physical Therapy - Outpatient ([facility])    [address]  [place] [address]   [phone]            Feb 06, 2017  9:00 AM   Treatment 60 with Rochelle Capone, PT   [facility] [place], Physical Therapy - Outpatient ([facility])    [address]  [place] [address]   [phone]            Feb 06, 2017  9:45 AM   Treatment 60 with Laura Johns, OT   [facility] [place], Occupational Therapy - Outpatient ([facility])    [address]  [place] [address]   [phone]                Further instructions from your care team       1/26/17    Go for EQUAL weight on both legs for sit to and from stand and walking.    We raised your cane one notch.    Partial right sidelying with pillow behind back - be sure Shannan pulls your right shoulder forward (shoulder and shoulder blade).  It should be comfortable.    Exercises attached incl do some standing and put weight on the right leg.  We will work on this in PT.  Also   Gentle weight on the right hand in the sitting position.      See you soon.  Rochelle  PT      MyChart Information     MyChart lets you send messages to your doctor, view your test results, renew your prescriptions, schedule appointments and more. To sign up, go to [email or web address] . Click on \"Log in\" on the left side of the screen, which will take you to the Welcome page. Then click on \"Sign up Now\" on the right side of the page.     You will be asked to enter the access code listed below, as well as some personal information. Please follow the directions to create your username and password.     Your access code is: [email or web address]  Expires: 4/23/2017 12:15 PM     Your " access code will  in 90 days. If you need help or a new code, please call your [facility] clinic or [phone].        Care EveryWhere ID     This is your Care EveryWhere ID. This could be used by other organizations to access your [facility] medical records  [id number]

## 2017-01-26 NOTE — PROGRESS NOTES
[facility] CLARIFICATION NOTE FOR DISCHARGE  Lowest score for each FIM item supported by available charting  Discharge FIM scores taken from charting on 1/23/17.      Grooming: FIM 7.  Charting indicates patient was independent with washing face, hands and shaving.  Bathing: FIM 4.  Charting indicates patient needed assist to rinse one body part.  Bowel: FIM 7.  Patient was continent of bowel.  Bowel Number of Accidents: 0  Tub/shower transfer: FIM 5. Patient had standby assist for shower transfer.  Comprehension: FIM 7.  Patient understood without difficulty, independent.  Expression: FIM 6.  Charting indicates patient had slight difficulty causing the need for extra time to express himself at times.  Social Interaction: FIM 7.  Charting indicates patient was appropriate socially.  Problem solving: FIM 6.  Charting indicates patient had slight difficulty with complex problem solving.  Memory: FIM 6.  Charting indicates patient had slight difficulty with recalling information.

## 2017-01-26 NOTE — DISCHARGE INSTRUCTIONS
1/26/17    Go for EQUAL weight on both legs for sit to and from stand and walking.    We raised your cane one notch.    Partial right sidelying with pillow behind back - be sure Shannan pulls your right shoulder forward (shoulder and shoulder blade).  It should be comfortable.    Exercises attached incl do some standing and put weight on the right leg.  We will work on this in PT.  Also   Gentle weight on the right hand in the sitting position.      See you soon.  Rohcelle  PT

## 2017-01-27 ENCOUNTER — HOSPITAL ENCOUNTER (OUTPATIENT)
Dept: OCCUPATIONAL THERAPY | Facility: CLINIC | Age: 52
Setting detail: THERAPIES SERIES
End: 2017-01-27
Attending: PHYSICAL MEDICINE & REHABILITATION
Payer: COMMERCIAL

## 2017-01-27 PROCEDURE — 97535 SELF CARE MNGMENT TRAINING: CPT | Mod: GO | Performed by: OCCUPATIONAL THERAPIST

## 2017-01-27 PROCEDURE — 97110 THERAPEUTIC EXERCISES: CPT | Mod: GO | Performed by: OCCUPATIONAL THERAPIST

## 2017-01-27 PROCEDURE — 97032 APPL MODALITY 1+ESTIM EA 15: CPT | Mod: GO | Performed by: OCCUPATIONAL THERAPIST

## 2017-01-27 PROCEDURE — 40000125 ZZHC STATISTIC OT OUTPT VISIT: Performed by: OCCUPATIONAL THERAPIST

## 2017-01-27 NOTE — PROGRESS NOTES
01/25/17 1200   Quick Adds   Type of Visit Initial Outpatient Occupational Therapy Evaluation   General Information   Start Of Care Date 01/25/17   Referring Physician Dr. Miguel Beauchamp   Orders Evaluate and treat as indicated   Orders Date 01/20/17   Medical Diagnosis hemorrhagic stroke with R hemiparesis   Onset of Illness/Injury or Date of Surgery 12/29/16   Special Instructions L basal ganglia hemorrhagic CVA  (PMH: HTN)   Surgical/Medical History Reviewed Yes   Additional Occupational Profile Info/Pertinent History of Current Problem Pt was travelling in [place] when presented with profound right sided weakness falling to the ground in hotel room, mild aphasia, R facial droop and admitted to hospital. Was then admitted to acute inpatient rehab in [place] 1/6/17 and Dc to home from there on 1/24/17. He worked FT in mailroom, enjoyed writing music and singing and playing guitar. He is a  and father to 14 y.o. son. He enjoys outdoor activities such as boating and bonfires.   Comments/Observations Wife present for evala nd supportive   Role/Living Environment   Current Community Support Family/friend caregiver  (wife and 14 y.o. son, 2 dogs)   Patient role/Employment history (worked FT prior in mailroom at [employer])   Community/Avocational Activities Plays guitar, writes music, boats and bonfires at cabin   Current Living Environment House  (2 story)   Number of Stairs to Enter Home yes without rail   Number of Stairs Within Home yes with railing on half of steps   Primary Bathroom Location/Comments main level has half bath and full bath upstairs bathroom   Primary Bathroom Set Up/Equipment Shower stall  (upstairs)   Additional Bathroom Set Up/Equipment Extended tub bench;Tub grab bar   Home/Community Accessibility Comments bedroom upstairs; sleeping in bed on main floor and and will sponge bathe until can do stairs   Prior Level - Transfers Independent   Prior Level - Ambulation Independent   Prior Level - ADLS  "Independent   Prior Level Comments Also has a [place] cabin in [place] without stairs   Current Assistive Devices - Mobility Quad cane;Manual wheelchair  (narrow based and R AFO-both new to patient since CVA)   Current Assistive Devices - ADL (Give Lizz sling for R UE to aid support of R UE)   Role/Living Environment Comments Employed;Disabled; wife on [employer] for at least 2 weeks was FT and may go back PT   Patient/family Goals Statement On DC from [facility] 1/24/17: \"Pt's goals are to get complete his morning routine, make simple/cold meals and to help wife out as much as possible. Per SLP - Patient able to complete everyday tasks from cognitive standpoint with minimal difficulties. Deficits become evident with just higher level cognition with slower processing. Becomes worse with increased distractions and time constrictions. Patient is fully intelligible but notable dysphonia that improves with increased breath support and/or head turn to his right.\"   Pain   Patient currently in pain No   Fall Risk Screen   Fall screen completed by OT   Have you fallen 2 or more times in the last year? No   Have you fallen and had an injury in the past year? No  (fell when had stroke without injury)   Timed Up and Go Score (seconds) (defer to PT eval tomorrow am)   Is the patient a fall risk? Yes;Referral Initiated;Department Fall Risk Interventions Implemented   Cognitive Status Examination   Cognitive Comment wife and pt noted slower word finding on admit to acute inpt rehab and pt notes now that is still present but better but voice quality is not at baseline. Will assess further to get baseline   Visual Perception   Visual Perception Comments no problems reported but will assess further   Sensation   Sensation Comments diminished on right UE per report-will assess further   Posture   Posture Comments WFL-at rest seated, bilateral shoulder blades are fairly symmetrical but with R scapular winging  and some downward rotation by pull of weight " of hemiparetic UE   Range of Motion (ROM)   ROM Comments L UE is full and R UE PROM is near full wtih R shoulder flexion to approx 160 with scapular rotation assisted and full elbow to hand, no tightness noted   Strength   Strength Comments R UE was flaccid on admit to ARU on 1/6/17 and today on eval has at least 3/5 scapular retraction, 2-/5 scapular elevation; 2-/5 scapular protraction; 1/5 scapular depression; 2-/5 shoulder flexion and ext; 1/5 bicep and 2-/5 tricep and 1/5 wrist extensors after tapping facilitation   Hand Strength   Hand Dominance Right   Left Hand  (pounds) 68 pounds   Right Hand  (pounds) 0 pounds   Left Lateral Pinch (pounds) 19 pounds   Right Lateral Pinch (pounds) 0 pounds   Left Three Point Pinch (pounds) 23 pounds   Right Three Point Pinch (pounds) 0 pounds   Hand Strength Comments R hand is impaired and unable to move   Muscle Tone   Muscle Tone Comments flaccid R UE; pt has noted tricep overflow of tone after tapping to elicit. Notes he had R toe flexor spasticity and then with baclofen x1 week this is not occurring any longer   Coordination   Right Hand, Nine Hole Peg Test (seconds) unable   Coordination Comments baseline anu UE tremor but reported it did not limit function; L UE coordination is WFL and R is impaired   Balance   Balance Comments Impaired dynamic balance   Functional Mobility   Ambulation CGA at least 80 feet with narrow based quad cane and off the shelf AFO   Transfer Skills   Transfer Comments SBA chair to/from w/c transfers   Transfer Skill   Level of [place]: Transfers stand-by assist   Bathing   Bathing Comments sponge bath as cannot get to second level to bathe   Upper Body Dressing   Level of [place]: Dress Upper Body independent   Upper Body Dressing Comments ?set up one handed techniques   Lower Body Dressing   Level of [place]: Dress Lower Body independent   Lower Body Dressing Comments ?set-up and one handed techniques   Toileting    Level of [place]: Toilet independent   Toileting Comments RTS wtih handles and will get a comfort height toilet installed soon   Grooming   Level of [place]: Grooming independent   Grooming Comments left, non-dominant hand   Eating/Self-Feeding   Level of [place]: Eating independent   Eating/Self Feeding Comments left, non-dominant hand   Activity Tolerance   Activity Tolerance Reduced, fatigues in muscle if elicits contraction after 1 rep. Needed manual w/c ride down [place] to therapy to prevent over fatigue   Instrumental Activities of Daily Living Assessment   IADL Assessment/Observations Relies on wife now for most due to R hemiparesis   Planned Therapy Interventions   Planned Therapy Interventions ADL training;IADL training;Balance training;Cognitive skills;Cognitive performance testing;Coordination training;Joint mobilization;Manual therapy;Neuromuscular re-education;Orthotic fitting/training;ROM;Self care/Home management;Strengthening;Stretching;Therapeutic activities;Transfer training;Visual perception  (fatigue management)   Planned Modalities Electrical stimulation   OT Goal 1   Goal Identifier Long-term Goal   Goal Description Pt will demonstrate use of R UE at at least 50% of baseline AROM and strength to allow for ability to complete tasks such as feed self, complete hygiene and two handed tasks such as IADL in prep for return to work.   Target Date 07/26/17   OT Goal 2   Goal Description Pt will demonstrate gross use of R UE in initial ranges of movement at all joints to use R UE as gross assist to his L UE and for unilateral tasks such as shutting off lights, grossly lifting R UE and pushing into sleeve of shirt   Target Date 04/19/17   OT Goal 3   Goal Description Pt will demonstrate understanding of a HEP for R UE ROM/stretching to prevent contracture for optimal AROM as needed for ADL/IADL   Target Date 04/19/17   OT Goal 4   Goal Description Pt will demonstrate ability to use R hand  for gross grasp and release of at least 2 inch objects 5x times in a row as needed for ADL/IADL with compliance with HEP with NMES HEP.   Target Date 04/19/17   OT Goal 5   Goal Description Pt will demonstrate functional attentional processing and recall as needed to return to safe ADL/IADL management including community mobility as measured by ability to complete divided attention visual attention task on [employer] scanboard WFL.   Target Date 04/19/17   Clinical Impression   Criteria for Skilled Therapeutic Interventions Met Yes, treatment indicated   OT Diagnosis Impaired ADL/IADL, leisure and work ability   Influenced by the following impairments IMpairments in R sided strength, ROM, coordination, sensation, tremor in L UE; cognitive changes, reduced endurance and fatigue; impaired balance   Assessment of Occupational Performance 5 or more Performance Deficits   Identified Performance Deficits Impaired use of dominant hand for grooming, dressing, bathing, all IADL including meal prep; household management, driving, functional communication via writing and computer access; work ability, leisure pursuits of outdoor activites such as boating; playing guitar, etc   Clinical Decision Making (Complexity) High complexity   Therapy Frequency 3x/week x 1 month, then 2x/wk x1 month and tapering as indicated based on need and progression   Predicted Duration of Therapy Intervention (days/wks) expect pt to be seen for up to 6 months pending progress   (3x/week x 4wks and then 2x/wk x4 weeks and taper)   Risks and Benefits of Treatment have been explained. Yes   Patient, Family & other staff in agreement with plan of care Yes   Clinical Impression Comments Currently motivated to get his function back in R side of body;   Education Assessment   Barriers To Learning Cognitive  (slowed processing)   Preferred Learning Style Listening;Reading;Demonstration;Pictures/video   Total Evaluation Time   Total Evaluation Time 15    DENISE Balderrama/MURRAY, MSCS  Occupational Therapy  [facility]  [address]  [place] [address]  Voice mail:[phone]  Fax: [phone]

## 2017-01-28 NOTE — PROGRESS NOTES
01/26/17 0800   General Information   Start of Care Date 01/26/17   Referring Physician Dr Miguel Beauchamp.   Orders Evaluate and Treat as Indicated   Order Date 01/20/17   Medical Diagnosis hemiparesis affecting R side s/p cva hemorrhagic stroke   Onset of illness/injury or Date of Surgery 12/29/16   Special Instructions was on falls precautions at [facility]   Surgical/Medical history reviewed Yes   Pertinent history of current problem (include personal factors and/or comorbidities that impact the POC) Mac here w wife Shannan..  CVA in [place].  using R afo at all times during the day.  afo makes heel feel stiff.  got off shelf afo at [facility].  walking around house on own.  got home 2 days ago from [facility].  i walk slow due to corners and doors.  i use the WBQC.  we keep bathroom light on.  energy is ok.  slept more at [facility].  no naps at home. by 9pm i am tired.   Pertinent Visual History  no contacts/ no glasses.    Prior level of functional mobility Ambulation   Ambulation indep/ worked full time.  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  active male.     Previous/Current Treatment Other   Other treatment [facility] all therapies   Current Community Support Family/friend caregiver   Patient role/Employment history Employed  (on medical leave; land [employer] )   Living environment House[facility]   Home/Community Accessibility Comments steps to get into house; lives in [facility]   Current Assistive Devices Quad Cane;Orthotics   Assistive Devices Comments R off shelf afo   Patient/Family Goals Statement walk w/out cane.  and maybe w/out brace.  walk faster.  walk normal.     General Information Comments R handed.  lives w Shannan wife and 15 yo 8th grade son.  2 dogs.     Fall Risk Screen   Fall screen completed by PT   Per patient - Fall 2 or more times in past year? No   System Outcome Measures   Outcome Measures AM-PAC   AM-PAC  Basic Mobility Score Level  (Lower scores equate to lower levels of function) 48.48   AM-PAC  Daily Activity Score Level   (Lower scores equate to lower levels of function) 36.98   AM-PAC  Applied Cognitive Score Level  (Lower scores equate to lower levels of function) 38.32   Pain   Patient currently in pain No   Vital Signs   Vital Signs Pulse;BP;SpO2   Pulse 78   /56 mmHg  (106/64 standing)   SpO2 96 %   Cognitive Status Examination   Orientation orientation to person, place and time   Level of Consciousness alert   Follows Commands and Answers Questions 100% of the time   Personal Safety and Judgment intact   Memory intact   Cognitive Comment appears intact, sl slurred words minimal   Integumentary   Integumentary No deficits were identified   Posture   Posture Normal   Range of Motion (ROM)   ROM Comment R ankle stiff, to 10 deg Dorsiflexion   Strength   Strength Comments L side intact.  R arm weak per OT eval,.  R hip flexion  gr 3-. R hip abd gr 2.  R knee ext gr 3-. right dorsi/plantarflexion gr 2-.      Bed Mobility   Bed Mobility Comments slow / indep but using L hand to lift R leg onto the mat table   Transfer Skills   Transfer Comments over uses L LE w sit/stand, tends to use L hand to push self up   Locomotion   Wheel Chair Mobility Comments n/a   Gait   Gait Comments slow, step to pattern at times, using WBQC on L.  hesitant, minimal head turning.  cautious.   Gait Special Tests   Gait Special Tests 25 FOOT TIMED WALK;DYNAMIC GAIT INDEX   Gait Special Tests 25 Foot Timed Walk   Seconds 26   Steps 26 Steps   Comments wbqc   Gait Special Tests Dynamic Gait Index   Score out of 24 13   Balance   Balance Comments can stand unsupported but much more wt on L leg.  cautious/prefers stable surface nearby   Sensory Examination   Sensory Perception other (describe)   Sensory Perception Comments monofilament 90% intact all 4's. missed 2 on R foot.   Coordination   Coordination Comments absent on R due to weakness   Muscle Tone   Muscle Tone Comments low tone R side.  pt is R handed.   Planned Therapy Interventions   Planned  Therapy Interventions balance training;bed mobility training;gait training;neuromuscular re-education;motor coordination training;orthotic fitting/training;ROM;strengthening;stretching;transfer training   Clinical Impression   Criteria for Skilled Therapeutic Interventions Met yes, treatment indicated   PT Diagnosis R side ramírez paresis    Influenced by the following impairments weakness, imbalance   Functional limitations due to impairments impaired bed mob, tx, gait, risk for falls due to weakness, unable to work   Clinical Presentation Stable/Uncomplicated   Clinical Presentation Rationale gait tests, clinical judgment   Clinical Decision Making (Complexity) Low complexity   Therapy Frequency other (see comments)   Predicted Duration of Therapy Intervention (days/wks) start 3x/wk and taper to 2x then weekly - up  to 20 visits and re - assess.  pt has 60 visits ot/pt/slp for year.     Risk & Benefits of therapy have been explained Yes   Patient, Family & other staff in agreement with plan of care Yes   Clinical Impression Comments motivated, young fit male w/ cva affecting dominant R side.  pt unable to drive or work.  gait quality is poor but he has excellent potential to improve.   Education Assessment   Preferred Learning Style Demonstration   Barriers to Learning Physical   GOALS   PT Eval Goals 1;2;3;4;5   Goal 1   Goal Identifier bed mobility   Goal Description pt to demo indep w bed mob using legs indep of UE's to get in/out of bed   Target Date 04/27/17   Goal 2   Goal Identifier gait   Goal Description 25ft walk test to show improvement in speed for safety - showing 25 ft walk test in 13 sec or less w approp AD   Target Date 04/27/17   Goal 3   Goal Identifier DGI   Goal Description pt to show improved DGI test for community ambulation for a score 19/24 or higher to show decreased falls risk   Target Date 04/27/17   Goal 4   Goal Identifier gait   Goal Description pt to amb w approp or no AD a distance of  1000ft for short community outings such as at grocery store or son's school   Target Date 04/27/17   Goal 5   Goal Identifier HEP   Goal Description pt to be indep w a LE strengthening and balance HEP to assist in meeting above goals and to be indep w floor tx for floor exer/stretching.    Target Date 04/27/17   Total Evaluation Time   Total Evaluation Time (Minutes) 30

## 2017-01-30 ENCOUNTER — OFFICE VISIT (OUTPATIENT)
Dept: FAMILY MEDICINE | Facility: CLINIC | Age: 52
End: 2017-01-30
Payer: COMMERCIAL

## 2017-01-30 VITALS
DIASTOLIC BLOOD PRESSURE: 68 MMHG | SYSTOLIC BLOOD PRESSURE: 108 MMHG | TEMPERATURE: 98.1 F | HEART RATE: 80 BPM | HEIGHT: 72 IN

## 2017-01-30 DIAGNOSIS — G81.91 RIGHT HEMIPARESIS (H): ICD-10-CM

## 2017-01-30 DIAGNOSIS — I61.9 HEMORRHAGIC STROKE (H): ICD-10-CM

## 2017-01-30 DIAGNOSIS — I10 HYPERTENSION GOAL BP (BLOOD PRESSURE) < 140/90: Primary | ICD-10-CM

## 2017-01-30 DIAGNOSIS — R47.1 DYSARTHRIA: ICD-10-CM

## 2017-01-30 PROCEDURE — 99214 OFFICE O/P EST MOD 30 MIN: CPT | Performed by: PHYSICIAN ASSISTANT

## 2017-01-30 RX ORDER — LISINOPRIL 10 MG/1
10 TABLET ORAL DAILY
Qty: 90 TABLET | Refills: 0 | Status: SHIPPED | OUTPATIENT
Start: 2017-01-30 | End: 2017-03-06

## 2017-01-30 NOTE — PROGRESS NOTES
SUBJECTIVE:                                                    Maldonado Mendiola is a 51 year old male who presents to clinic today for the following health issues:    Hospital Follow-up Visit:    Hospital/Nursing Home/IP Rehab Facility: [facility]   Date of Admission: 1/26/2017  Date of Discharge: NA   Reason(s) for Admission: Stroke             Problems taking medications regularly:  None       Medication changes since discharge: None       Problems adhering to non-medication therapy:  None    Summary of hospitalization:  [facility] [place] hospital discharge summary reviewed  Diagnostic Tests/Treatments reviewed.  Follow up needed: none  Other Healthcare Providers Involved in Patient s Care:         None  Update since discharge: improved.     Post Discharge Medication Reconciliation: discharge medications reconciled, continue medications without change.  Plan of care communicated with patient     Coding guidelines for this visit:  Type of Medical   Decision Making Face-to-Face Visit       within 7 Days of discharge Face-to-Face Visit        within 14 days of discharge   Moderate Complexity 68389 [id number]   High Complexity 20814 37310          Mac was admitted to the [facility] in [place] on 12/29/16 for acute onset right sided weakness and left facial droop. Full evaluation revealed a left basal ganglia hemorrhagic stroke. Cause likely hypertension - was a perfusion hemorrhagic cause - no AVM was identified. He was treated for uncontrolled hypertension and rehab completed here in [place] this past few weeks. He's had some improvement with better facial use, speech and he has right sided return of some strength - 25% or so. He denies any major concerns and plans on continued follow ups with rehab, OT, Neurosurgery, Surgery. His BP ranges have been normal.     Patient Active Problem List   Diagnosis     Esophageal reflux     CARDIOVASCULAR SCREENING; LDL GOAL  LESS THAN 160     Panic attack     History of stroke     Hemorrhagic stroke (H)     Right hemiparesis (H)     Dysarthria     Cognitive deficits as late effect of cerebrovascular disease     Cognitive and behavioral changes     Slow transit constipation     Adjustment insomnia      Current Outpatient Prescriptions   Medication     lisinopril (PRINIVIL/ZESTRIL) 10 MG tablet     diphenhydrAMINE (BENADRYL) 25 MG capsule     senna-docusate (SENOKOT-S;PERICOLACE) 8.6-50 MG per tablet     baclofen (LIORESAL) 10 MG tablet     No current facility-administered medications for this visit.        Problem list and histories reviewed & adjusted, as indicated.  Additional history: as documented    Problem list, Medication list, Allergies, and Medical/Social/Surgical histories reviewed in EPIC and updated as appropriate.    ROS:  Constitutional, HEENT, cardiovascular, pulmonary, gi and gu systems are negative, except as otherwise noted.    OBJECTIVE:                                                    /68 mmHg  Pulse 80  Temp(Src) 98.1  F (36.7  C) (Oral)  Ht 6' (1.829 m)  Wt   There is no weight on file to calculate BMI.  GENERAL: healthy, alert and no distress  NEURO: CN II-XII grossly intact though there is effort on his part to ensure proper use on the left side. His left arm he has 2/5 strength on the upper arm and 0/5 lower right arm. Right leg, he has 2/5 strength with leg lift and extension/flexion, remaining movement is 0-1/5. Distal sensation is mostly intact upper and lower extremity.   CV: Normal rate and rhythm      ASSESSMENT/PLAN:                                                        ICD-10-CM    1. Hypertension goal BP (blood pressure) < 140/90 I10 lisinopril (PRINIVIL/ZESTRIL) 10 MG tablet   2. Hemorrhagic stroke (H) I61.9 lisinopril (PRINIVIL/ZESTRIL) 10 MG tablet   3. Right hemiparesis (H) G81.91 lisinopril (PRINIVIL/ZESTRIL) 10 MG tablet   4. Dysarthria R47.1 lisinopril (PRINIVIL/ZESTRIL) 10 MG tablet       He's doing well and having improvements in all realms. Reviewed continue PT, OT, Rehab and self cares. Recommend slow return to activity. He's only minimally ambulatory but he can transfer. He has a very supportive family and wife is on board. He has resources for finances and his house has been safety checked. He has no major concerns.    We spent 25 minutes together over 50% counseling and education. He can return in 3-6 months or as needed for recheck with me.     CARO LOPEZ PA-C  [facility]

## 2017-01-30 NOTE — NURSING NOTE
Chief Complaint   Patient presents with     Hospital F/U     Other     Patient has brought in home blood pressure readings        Initial /68 mmHg  Pulse 80  Temp(Src) 98.1  F (36.7  C) (Oral)  Ht 6' (1.829 m)  Wt  Estimated body mass index is 21.45 kg/(m^2) as calculated from the following:    Height as of this encounter: 6' (1.829 m).    Weight as of 1/6/17: 158 lb 3.2 oz (71.759 kg).  BP completed using cuff size: john Delgado CMA (AAMA)

## 2017-01-30 NOTE — MR AVS SNAPSHOT
After Visit Summary   1/30/2017    Maldonado Mendiola    MRN: [id number]           Patient Information     Date Of Birth          1965        Visit Information        Provider Department      1/30/2017 2:20 PM Edmond Cano PA-C [facility]        Today's Diagnoses     Hypertension goal BP (blood pressure) < 140/90    -  1     Hemorrhagic stroke (H)         Right hemiparesis (H)         Dysarthria            Follow-ups after your visit        Your next 10 appointments already scheduled     Jan 31, 2017  8:30 AM   Treatment 45 with Rochel[facility]one[facility] PT   [facility]    [address]  [place] [address]   [phone]            Jan 31, 2017  9:15 AM   Treatment 60 with Raghav Matute, OT   [facility] ([facility])    [address]  [place] [address]   [phone]            Feb 01, 2017  8:30 AM   Treatment 60 with Laura Johns, OT   [facility] ([facility])    [address]  [place] [address]   [phone]            Feb 01, 2017  9:30 AM   Treatment 45 with Rochelle Capone, PT   [facility] ([facility])    [address]  [place] [address]   [phone]            Feb 03, 2017  8:00 AM   Treatment 60 with Raghav Matute, OT   [facility] ([facility])    [address]  [place] [address]   [phone]            Feb 03, 2017  9:00 AM   Treatment 45 with Rochelle Capone, PT   [facility])    [address]  [place] [address]   [phone]            Feb 06, 2017  9:00 AM   Treatment 45 with Rochelle Capone, PT   [facility] [place], Physical Therapy - Outpatient ([facility])    [address]  [place] [address]   [phone]            Feb 06, 2017  9:45 AM   Treatment 60 with Laura Johns, OT   [facility] [place], Occupational Therapy - Outpatient ([facility])    [address]  [place] [address]   [phone]            Feb 08, 2017  8:30 AM   Treatment 60 with Laura Johns, OT   [facility] [place], Occupational Therapy - Outpatient ([facility])    [address]  [place] [address]   [phone]            Feb 08, 2017  9:30 AM   Treatment 45 with Rochelle Capone, PT   [facility] [place], Physical Therapy - Outpatient ([facility])    [address]  [place] [address]   [phone]              Who to contact     If you have questions or need follow up information about today's clinic visit or your schedule please contact [facility] directly at [phone].  Normal or non-critical lab and imaging results will be communicated to you by [employer], letter or phone within 4 business days after the clinic has received the results. If you do not hear from us within 7 days, please contact the clinic through [employer] or phone. If you have a critical or abnormal lab result, we will notify you by phone as soon as possible.  Submit refill requests through [employer] or call your pharmacy and they will forward the refill request to us. Please allow 3 business days for your refill to be completed.          Additional Information About Your Visit        [employer] Information     [employer] lets you send messages to your doctor, view your test  "results, renew your prescriptions, schedule appointments and more. To sign up, go to [email or web address] . Click on \"Log in\" on the left side of the screen, which will take you to the Welcome page. Then click on \"Sign up Now\" on the right side of the page.     You will be asked to enter the access code listed below, as well as some personal information. Please follow the directions to create your username and password.     Your access code is: [email or web address]  Expires: 2017 12:15 PM     Your access code will  in 90 days. If you need help or a new code, please call your [facility] clinic or [phone].        Care EveryWhere ID     This is your Care EveryWhere ID. This could be used by other organizations to access your [facility] medical records  [id number]        Your Vitals Were     Pulse Temperature Height             80 98.1  F (36.7  C) (Oral) 6' (1.829 m)          Blood Pressure from Last 3 Encounters:   17 108/68   17 108/58   09/18/15 142/69    Weight from Last 3 Encounters:   17 158 lb 3.2 oz (71.759 kg)   09/18/15 190 lb 3.2 oz (86.274 kg)   07/17/15 186 lb 9.6 oz (84.641 kg)              Today, you had the following     No orders found for display         Where to get your medicines      These medications were sent to [id number] - [place], [place] - [address] AT [facility] [address]  [address], [place] [address]     Phone:  [phone]    - lisinopril 10 MG tablet       Primary Care Provider Office Phone # Fax #    Edmond Cano PA-C [phone]       [facility] [address] [address]        Thank you!     Thank you for choosing [facility]  for your care. Our goal is always to provide you with excellent care. Hearing back from our patients is one way we can continue to improve our services. Please take a few minutes to complete the written " survey that you may receive in the mail after your visit with us. Thank you!             Your Updated Medication List - Protect others around you: Learn how to safely use, store and throw away your medicines at www.disposemymeds.org.          This list is accurate as of: 1/30/17  3:12 PM.  Always use your most recent med list.                   Brand Name Dispense Instructions for use    baclofen 10 MG tablet    LIORESAL    90 tablet    Take 0.5-1 tablets (5-10 mg) by mouth 3 times daily       diphenhydrAMINE 25 MG capsule    BENADRYL     Take 1-2 capsules (25-50 mg) by mouth nightly as needed for sleep       lisinopril 10 MG tablet    PRINIVIL/ZESTRIL    90 tablet    Take 1 tablet (10 mg) by mouth daily       senna-docusate 8.6-50 MG per tablet    SENOKOT-S;PERICOLACE     Take 1-2 tablets by mouth daily as needed for constipation

## 2017-01-31 ENCOUNTER — HOSPITAL ENCOUNTER (OUTPATIENT)
Dept: OCCUPATIONAL THERAPY | Facility: CLINIC | Age: 52
Setting detail: THERAPIES SERIES
End: 2017-01-31
Attending: PHYSICAL MEDICINE & REHABILITATION
Payer: COMMERCIAL

## 2017-01-31 ENCOUNTER — HOSPITAL ENCOUNTER (OUTPATIENT)
Dept: PHYSICAL THERAPY | Facility: CLINIC | Age: 52
Setting detail: THERAPIES SERIES
End: 2017-01-31
Attending: PHYSICAL MEDICINE & REHABILITATION
Payer: COMMERCIAL

## 2017-01-31 DIAGNOSIS — I69.90 LATE EFFECTS OF CVA (CEREBROVASCULAR ACCIDENT): Primary | ICD-10-CM

## 2017-01-31 PROCEDURE — 97110 THERAPEUTIC EXERCISES: CPT | Mod: GO | Performed by: OCCUPATIONAL THERAPIST

## 2017-01-31 PROCEDURE — 97032 APPL MODALITY 1+ESTIM EA 15: CPT | Mod: GO | Performed by: OCCUPATIONAL THERAPIST

## 2017-01-31 PROCEDURE — 97116 GAIT TRAINING THERAPY: CPT | Mod: GP | Performed by: PHYSICAL THERAPIST

## 2017-01-31 PROCEDURE — 97110 THERAPEUTIC EXERCISES: CPT | Mod: GP | Performed by: PHYSICAL THERAPIST

## 2017-01-31 PROCEDURE — 40000125 ZZHC STATISTIC OT OUTPT VISIT: Performed by: OCCUPATIONAL THERAPIST

## 2017-01-31 PROCEDURE — 40000719 ZZHC STATISTIC PT DEPARTMENT NEURO VISIT: Performed by: PHYSICAL THERAPIST

## 2017-01-31 PROCEDURE — 97535 SELF CARE MNGMENT TRAINING: CPT | Mod: GO | Performed by: OCCUPATIONAL THERAPIST

## 2017-01-31 PROCEDURE — 97112 NEUROMUSCULAR REEDUCATION: CPT | Mod: GO | Performed by: OCCUPATIONAL THERAPIST

## 2017-02-01 ENCOUNTER — HOSPITAL ENCOUNTER (OUTPATIENT)
Dept: PHYSICAL THERAPY | Facility: CLINIC | Age: 52
Setting detail: THERAPIES SERIES
End: 2017-02-01
Attending: PHYSICAL MEDICINE & REHABILITATION
Payer: COMMERCIAL

## 2017-02-01 ENCOUNTER — TELEPHONE (OUTPATIENT)
Dept: FAMILY MEDICINE | Facility: CLINIC | Age: 52
End: 2017-02-01

## 2017-02-01 ENCOUNTER — HOSPITAL ENCOUNTER (OUTPATIENT)
Dept: OCCUPATIONAL THERAPY | Facility: CLINIC | Age: 52
Setting detail: THERAPIES SERIES
End: 2017-02-01
Attending: PHYSICAL MEDICINE & REHABILITATION
Payer: COMMERCIAL

## 2017-02-01 PROCEDURE — 97530 THERAPEUTIC ACTIVITIES: CPT | Mod: GO | Performed by: OCCUPATIONAL THERAPIST

## 2017-02-01 PROCEDURE — 97110 THERAPEUTIC EXERCISES: CPT | Mod: GO | Performed by: OCCUPATIONAL THERAPIST

## 2017-02-01 PROCEDURE — 97110 THERAPEUTIC EXERCISES: CPT | Mod: GP | Performed by: PHYSICAL THERAPIST

## 2017-02-01 PROCEDURE — 40000719 ZZHC STATISTIC PT DEPARTMENT NEURO VISIT: Performed by: PHYSICAL THERAPIST

## 2017-02-01 PROCEDURE — 40000125 ZZHC STATISTIC OT OUTPT VISIT: Performed by: OCCUPATIONAL THERAPIST

## 2017-02-01 PROCEDURE — 97116 GAIT TRAINING THERAPY: CPT | Mod: GP | Performed by: PHYSICAL THERAPIST

## 2017-02-01 PROCEDURE — 97032 APPL MODALITY 1+ESTIM EA 15: CPT | Mod: GO | Performed by: OCCUPATIONAL THERAPIST

## 2017-02-01 PROCEDURE — 97112 NEUROMUSCULAR REEDUCATION: CPT | Mod: GO | Performed by: OCCUPATIONAL THERAPIST

## 2017-02-01 NOTE — TELEPHONE ENCOUNTER
Reason for Call:  Medication or medication refill:    Name of the pharmacy and phone number for the current request:     [id number] - [place], [place] - [address] AT [facility] [address]    Name of the medication requested: Requesting a depression medication. His stroke recovery seems to be hard, she states.    Other request: Please call to discuss.    Can we leave a detailed message on this number? YES    Phone number patient can be reached at: LU[facility] LEON () [phone] (M)    Best Time: anytime today.    Call taken on 2/1/2017 at 8:35 AM by Shivani Granados

## 2017-02-01 NOTE — TELEPHONE ENCOUNTER
"Called and spoke with patient. Normally a very happy person, just had a stroke-coming more to terms with having the stroke and it \"makes for a heavy day\". He thinks it will be a day to day \"coping thing\". He was wanting something that he could take on days that are harder, but I explained that most depression medications are taken every day in order to get the needed effect. Would you be willing to do a phone visit with him? It is difficult for him to get here.   No thoughts of self harm or suicide, no altered mental status, delusions, past inpatient admission for depression, previous suicide attempts, loss of appetite, inability to function, abrupt cessation of drugs/alcohol or caffeine, or drug or alcohol abuse.    Will route to provider to advise.     Manuel Torres RN    "

## 2017-02-02 NOTE — TELEPHONE ENCOUNTER
Called and spoke to  Mac. He is going to explore accupuncture and maybe some other lifestyle things and will let us know if there is anything else he wants to try. He is not interested in something for anxiety or a long term depression medication.     Chloe Azar RN   February 2, 2017 9:41 AM  [facility] Triage   [phone]

## 2017-02-02 NOTE — TELEPHONE ENCOUNTER
Reviewed. Yes, we have medications for acute anxiety but we don't have medications for as needed low mood. We did briefly discuss at his office visit.     I'm fine using something for panic / anxiety - occasional use, but with his gait, instability, it might serve to make things worse at least short term when used. Let me know if they want something like that to try.    Otherwise, an anti-depressant for a few months is not a bad idea (Prozac type drug).     I did some research in follow up on benefits of managing post stroke issues and acupuncture came up the winner in a number of studies as beneficial for rehabilitation for people who have had a stroke. They could also use that for mood benefits as well. Still consider [facility] Acupuncture if they want.    Let me know if they want something for anxiety/panic or if they want to do a daily Prozac type med for a few months.    Thank you.  Edmond Cano, MPAS, PA-C

## 2017-02-03 ENCOUNTER — HOSPITAL ENCOUNTER (OUTPATIENT)
Dept: PHYSICAL THERAPY | Facility: CLINIC | Age: 52
Setting detail: THERAPIES SERIES
End: 2017-02-03
Attending: PHYSICAL MEDICINE & REHABILITATION
Payer: COMMERCIAL

## 2017-02-03 ENCOUNTER — HOSPITAL ENCOUNTER (OUTPATIENT)
Dept: OCCUPATIONAL THERAPY | Facility: CLINIC | Age: 52
Setting detail: THERAPIES SERIES
End: 2017-02-03
Attending: PHYSICAL MEDICINE & REHABILITATION
Payer: COMMERCIAL

## 2017-02-03 PROCEDURE — 40000719 ZZHC STATISTIC PT DEPARTMENT NEURO VISIT: Performed by: PHYSICAL THERAPIST

## 2017-02-03 PROCEDURE — 40000125 ZZHC STATISTIC OT OUTPT VISIT: Performed by: OCCUPATIONAL THERAPIST

## 2017-02-03 PROCEDURE — 97116 GAIT TRAINING THERAPY: CPT | Mod: GP | Performed by: PHYSICAL THERAPIST

## 2017-02-03 PROCEDURE — 97032 APPL MODALITY 1+ESTIM EA 15: CPT | Mod: GO | Performed by: OCCUPATIONAL THERAPIST

## 2017-02-03 PROCEDURE — 97112 NEUROMUSCULAR REEDUCATION: CPT | Mod: GO | Performed by: OCCUPATIONAL THERAPIST

## 2017-02-04 DIAGNOSIS — G81.91 RIGHT HEMIPARESIS (H): ICD-10-CM

## 2017-02-04 DIAGNOSIS — I61.9 HEMORRHAGIC STROKE (H): Primary | ICD-10-CM

## 2017-02-06 ENCOUNTER — HOSPITAL ENCOUNTER (OUTPATIENT)
Dept: OCCUPATIONAL THERAPY | Facility: CLINIC | Age: 52
Setting detail: THERAPIES SERIES
End: 2017-02-06
Attending: PHYSICAL MEDICINE & REHABILITATION
Payer: COMMERCIAL

## 2017-02-06 ENCOUNTER — HOSPITAL ENCOUNTER (OUTPATIENT)
Dept: PHYSICAL THERAPY | Facility: CLINIC | Age: 52
Setting detail: THERAPIES SERIES
End: 2017-02-06
Attending: PHYSICAL MEDICINE & REHABILITATION
Payer: COMMERCIAL

## 2017-02-06 PROCEDURE — 97112 NEUROMUSCULAR REEDUCATION: CPT | Mod: GO | Performed by: OCCUPATIONAL THERAPIST

## 2017-02-06 PROCEDURE — 97116 GAIT TRAINING THERAPY: CPT | Mod: GP | Performed by: PHYSICAL THERAPIST

## 2017-02-06 PROCEDURE — 97032 APPL MODALITY 1+ESTIM EA 15: CPT | Mod: GO | Performed by: OCCUPATIONAL THERAPIST

## 2017-02-06 PROCEDURE — 40000719 ZZHC STATISTIC PT DEPARTMENT NEURO VISIT: Performed by: PHYSICAL THERAPIST

## 2017-02-06 PROCEDURE — 97110 THERAPEUTIC EXERCISES: CPT | Mod: GP | Performed by: PHYSICAL THERAPIST

## 2017-02-06 PROCEDURE — 40000125 ZZHC STATISTIC OT OUTPT VISIT: Performed by: OCCUPATIONAL THERAPIST

## 2017-02-08 ENCOUNTER — HOSPITAL ENCOUNTER (OUTPATIENT)
Dept: OCCUPATIONAL THERAPY | Facility: CLINIC | Age: 52
Setting detail: THERAPIES SERIES
End: 2017-02-08
Attending: PHYSICAL MEDICINE & REHABILITATION
Payer: COMMERCIAL

## 2017-02-08 ENCOUNTER — HOSPITAL ENCOUNTER (OUTPATIENT)
Dept: PHYSICAL THERAPY | Facility: CLINIC | Age: 52
Setting detail: THERAPIES SERIES
End: 2017-02-08
Attending: PHYSICAL MEDICINE & REHABILITATION
Payer: COMMERCIAL

## 2017-02-08 PROCEDURE — 40000125 ZZHC STATISTIC OT OUTPT VISIT: Performed by: OCCUPATIONAL THERAPIST

## 2017-02-08 PROCEDURE — 97112 NEUROMUSCULAR REEDUCATION: CPT | Mod: GO | Performed by: OCCUPATIONAL THERAPIST

## 2017-02-08 PROCEDURE — 40000719 ZZHC STATISTIC PT DEPARTMENT NEURO VISIT: Performed by: PHYSICAL THERAPIST

## 2017-02-08 PROCEDURE — 97116 GAIT TRAINING THERAPY: CPT | Mod: GP | Performed by: PHYSICAL THERAPIST

## 2017-02-08 PROCEDURE — 97032 APPL MODALITY 1+ESTIM EA 15: CPT | Mod: GO | Performed by: OCCUPATIONAL THERAPIST

## 2017-02-08 PROCEDURE — 97110 THERAPEUTIC EXERCISES: CPT | Mod: GP | Performed by: PHYSICAL THERAPIST

## 2017-02-08 PROCEDURE — 97110 THERAPEUTIC EXERCISES: CPT | Mod: GO | Performed by: OCCUPATIONAL THERAPIST

## 2017-02-09 ENCOUNTER — OFFICE VISIT (OUTPATIENT)
Dept: OTOLARYNGOLOGY | Facility: CLINIC | Age: 52
End: 2017-02-09

## 2017-02-09 DIAGNOSIS — R49.0 DYSPHONIA: Primary | ICD-10-CM

## 2017-02-09 NOTE — LETTER
2/9/2017       RE: Maldonado Mendiola  [address] [facility] [address]     Dear Colleague,    Thank you for referring your patient, Maldonado Mendiola, to the [facility]. Please see a copy of my visit note below.    [facility]  Teja Campbell Jr., M.D., F.A.C.S.  Kati Smallwood M.D., M.P.H.  Jessica Biggs, Ph.D., CCC/SLP  Nely Lazcano M.M. (voice), M.A., CCC/SLP  Andrew Manning M.M. (voice), M.A., [facility]/SLP    [facility]  INITIAL EVALUATION AND LARYNGEAL EXAMINATION REPORT    Patient: Maldonado Mendiola  Date of Visit: 2/9/2017    CHIEF COMPLAINT: Voice changes,     HISTORY  PATIENT INFORMATION  Maldonado Mendiola was seen for initial voice evaluation, laryngeal examination and treatment today.  He is seen at the kind referral of Dr. Miguel Beauchamp. Please refer to the physician s dictation for a more complete history and impressions.     DIAGNOSIS/REASON FOR REFERRAL  Dysphonia s/p CVA / Evaluate, perform laryngeal exam, treat as appropriate    HISTORY OF VOICE DISORDER  Mr. Mendiola is a 51 year old gentleman with a history of a basal ganglia hemorrhagic stroke on 12/29/16.  Salient details of his history are as follows:    Stroke occurred when travelling with family on the [facility]    Following initial care [facility] in [place], rehabilitation was transferred to [facility] 1/6/17    Extensive services including PT, OT, and SLP, and strong progress to date    Anomia is greatly improving, with only one episode of difficulty word finding per day per patient report    He noted changes to voice and speech immediately following the stroke    Although limited during this period of recovery, he has high vocal demands at his job, and is anavocational avila     In addition to regular practice of dysarthria exercises provided during rehabilitation, he has been practicing voice exercises that he found online    He feels that his voice is  "improving over time but has not returned to baseline.    CURRENT SYMPTOMS INCLUDE:  VOICE    Poor voice quality    Worse in the evening    Increased vocal fatigue    Describes voice as sounding \"like he's getting a cold\" or \"like an old man\"    Higher pitch than pre-stroke    Difficulty projecting voice    SPEECH    Intermittent slurred speech    SWALLOWING    He is noticing increased oral residue intermittently    Eating normal diet    No sensation of food going down the wrong pipe    He is continued to lose weight (15-20lbs total)    Restricted apetite    OTHER PERTINENT HISTORY    Complex medical history: please also refer to Dr. Beauchamp's dictation.     OBJECTIVE FINDINGS  Patient Supplied Answers To VHI Questionnaire  Voice Handicap Index (VHI-10) 2/9/2017   (F1) My voice makes it difficult for people to hear me. Sometimes   (F2) People have difficulty understanding me in a noisy room. Sometimes   (F8) My voice difficulties restrict my personal and social life. Sometimes   (F9) I feel left out of conversations because of my voice. Almost never   (F10) My voice problem causes me to lose income. Almost never   (P5) I feel as though I have to strain to produce voice. Never   (P6) The clarity of my voice is unpredictable. Sometimes   (E4) My voice problem upsets me. Always   (E6) My voice makes me feel handicapped. Always   (P3) People ask, \"What's wrong with your voice?\" Never   VHI Total Score 18     VOICE AND SPEECH EVALUATION  An evaluation of the voice was accomplished and audio recorded today; salient features are as follows:    Palpation of the laryngeal area: supple musculature with no reported tenderness in the thyrohyoid space.  Appropriate hyolaryngeal elevation noted on swallow    Breathing pattern: shallow and phonation is not coordinated with respiration    Tension is evident: no overt tension visible  VOICE:    Mild, Consistent breathiness    Mild, Intermittent roughness    Mild to moderate, " Intermittent strain    Habitual pitch was not formally tested, but is judged to be WNL and appropriate     Moderate aesthenia    Intensity:     Conversational speech - informally judged to be mildly reduced for the setting    Projected speech - patient demonstrated intermittent difficulty projecting his voice beyond a loud conversational volume initially.  With clinician model he was able to demonstrate appropriately raised volume with minimal increase in strain.    Sustained phonation: reduced strain and roughness compared to overall perceptual ratings.  Consistent across vowel contexts.    Pitch Glide task : patient is able to adequately glide from modal to upper register, but demonstrates significantly increased perceptual strain and facial grimacing with elevated pitch.  Intermittent instability noted during register transitions.    Singing vs. Speech - singing demonstrated reduced strain and roughness compared to everyday speech    He states today is a typical voice day    He rates his effort as 0 out of 10 (10 is maximum effort) for speech; 9 out of 10 for singing    He rates overall voice quality as 3 out of 10 (0 being worst)    GLOBAL ASSESSMENT OF DYSPHONIA:  34/100    CAPE-V Overall Severity:  21/100    SPEECH:    Mild consistent distortion on sibilants and sibilant blends    Informally, intelligbility is judged to be minimally affected     COUGH/AIRWAY:    Not observed      LARYNGEAL EXAMINATION  Asaf Manning M.M., M.A., CCC/SLP accomplished the endoscopic laryngeal examination today.    Verbal consent was obtained and witnessed prior to this procedure.   A time-out was performed, verifying patient, procedure, and site.   Type of exam:   Procedure: Flexible endoscopy with chip-tip technology with stroboscopy, right nostril; topical anesthesia with 3% Lidocaine and 0.25% phenylephrine was applied.   This exam shows:    Essentially healthy laryngeal and vocal fold mucosa    Signs of reflux: no  remarkable signs of reflux    Secretions:  No significant pooling of secretions with mild diffuse presence of thickened secretions on the vocal folds and throughout the laryngeal area    Vocal fold mucosa:  Mildly concave vibratory margins bilaterally.  Otherwise within normal limits with no focal abnormalities    Vocal fold function:   o Vocal folds are mobile and meet at midline  o During repetitive tasks and voice use RTVF lags slightly behind LTVF in abduction  o Motion is WFL for both voice and breathing    Airway is adequate    elongation of the vocal folds for pitch increase is normal    Subtle palatal tremor was appreciated during phonation    Moderate four-way constriction of the supraglottic larynx during connected speech    Therapy probes show reduced hyperfunction with forward resonant stimuli    The addition of stroboscopy provided the following information:   o Amplitude: WNL bilaterally  o Mucosal Wave: WNL bilaterally  o Glottic closure:  Complete with slight open phase dominance  o Symmetry:  Intermittent transient asymmetry  o Periodicity: essentially regular      Essentially healthy mucosa.  Mildly concave vibratory margins.      Supraglottic hyperfunction during running speech    I reviewed this laryngeal exam with Mr. Mendiola today, and I provided pertinent explanations, as well as written and oral information.    THERAPEUTIC ACTIVITIES  Today Mr. Mendiola participated in the following therapeutic activities:    Asked many questions about the nature of his symptoms, and I answered all of these thoroughly.    Exercises to promote optimal respiratory mechanics    I provided explanation of the anatomy and physiology of respiration for speech and singing; he found this to be helpful    he demonstrated excessive upper thoracic engagement during inhalation    Demonstrated difficulty allowing abdominal relaxation for inhalation    Practiced in a forward leaning seated posture, with tactile cue of a hand  "on the low rib-cage to facilitate awareness of low respiratory engagement    With clinician support, patient was able to demonstrate improved abdominal relaxation and engagement on inhalation    Optimal exhalation using inward engagement of the abdominal wall with no corresponding collapse of the upper chest cavity was trained using the pulsed \"sh\" task    75% accuracy with minimal clinician support    Semi-Occluded Vocal Tract (SOVT) exercises instructed to reduce laryngeal tension, promote vocal fold pliability, and coordinate respiration and phonation    Straw with water resistance was found to be most facilitating     Sustained phonation, and voice vs. voiceless productions used to promote easy voicing and raise awareness of laryngeal tension    Ascending and descending glides utilized to promote vocal fold pliability    Advanced to /w/ phoneme to promote forward locus of resonance     80% accuracy with minimal clinician support    Exercises to promote glottal efficiency    A modified regimen of vocal function exercises was introduced using afore mentioned SOVT exercises    The importance of maintainingminimal effort, forward locus of resonance, and consistency of airflow before concerns of maximum duration was stressed repeatedly    Maximum phonation time D3-A3 averaged 13 seconds      Concepts of an optimal regimen for practice were instructed.    He should use an interval schedule of practice, with brief periods of practice frequently throughout each day    Orient concepts of volitional practice to facilitate motor learning.    I provided an audio recording and handouts of today's therapeutic activities to facilitate practice.    IMPRESSIONS/ RECOMMENDATIONS/ PLAN  IMPRESSIONS / RECOMMENDATIONS  Based on today's evaluation and laryngeal examination, it appears that:    Essentially healthy laryngeal mucosa with mildly concave vocal fold vibratory margins, subtle sluggish abduction of the RTVF during " repetitive tasks, and open phase predominate closure pattern during stroboscopy.    Dysphonia is accounted for by the hyperfunction of the intrinsic and extrinsic laryngeal musculature, likely in response to subtle physiologic changes. This is compounded by non-optimal coordination of respiration and phonation.    A short course of speech therapy is recommended to optimize vocal technique, improve voice quality and promote reduced discomfort, effort and fatigue, so that the patient is able to meet his personal and professional vocal demands.    He is entirely amenable to this plan    We began therapy today, working on strategies to improve glottic closure and coordinate respiration and phonation    The patient also expressed interest in therapy to address lingering dysarthria and weakness in the orofacial musculature.  As this is outside my primary expertise I will work with the patient to make an appropriate referral.    TREATMENT PLAN  Speech therapy    DURATION/FREQUENCY OF TREATMENT  Three bi-weekly, one-hour sessions, with one monthly one-hour follow-up sessions    PROGNOSIS  Good prognosis for voice improvement with speech therapy and regular practice of therapeutic activities.    BARRIERS TO LEARNING/TEACHING AND LEARNING NEEDS  None/Unremarkable    GOALS  Patient goal:   1. To improve and maintain a healthy voice quality  2. To understand the problem and fix it as much as possible    Short-term goal(s): Within the first 4 sessions, Mr. Mendiola:  1. will be able to independently list key factors in maintenance of good vocal hygiene with 80% accuracy, and report on their use outside the therapy room.  2. will utilize silent inhalation with good low-respiratory engagement 75% of the time during therapy tasks with minimal clinician support  3. will demonstrate semi-occluded vocal tract (SOVT) exercises with at least 80% accuracy with no clinician support  4. will accurately identify target vs. habitual voice  quality during therapy tasks in 4 out of 5 trials with no clinician support    Long-term goal(s): In 6 months, Mr. Mendiola will:  1. Report a week of typical activities, in which dysphonia does not exceed a level of 2 out of 10, 80% of the time    PRIMARY ICD-10 code:  R49.0 (Dysphonia)    TOTAL SERVICE TIME: 130 minutes  EVALUATION OF VOICE AND RESONANCE: (29519): 40 minutes    TREATMENT (08959): 60 minutes  ENDOSCOPIC LARYNGEAL EXAMINATION WITH STROBOSCOPY (90346): 30 minutes  NO CHARGE FACILITY FEE (13945)    Asaf Manning M.M., M.A., CCC-SLP  Speech-Language Pathologist  Certificate of Vocology  [phone]

## 2017-02-09 NOTE — PROGRESS NOTES
"[facility] VOICE CLINIC  Teja Campbell Jr., M.D., F.A.C.S.  Kati Smallwood M.D., M.P.H.  Jessica Biggs, Ph.D., CCC/SLP  Nely Lazcano M.M. (voice), M.A., CCC/SLP  Andrew Manning M.M. (voice), MBREANN., [facility]/SLP    [facility] VOICE [facility]  INITIAL EVALUATION AND LARYNGEAL EXAMINATION REPORT    Patient: Maldonado Mendiola  Date of Visit: 2/9/2017    CHIEF COMPLAINT: Voice changes,     HISTORY  PATIENT INFORMATION  Maldonado Mendiola was seen for initial voice evaluation, laryngeal examination and treatment today.  He is seen at the kind referral of Dr. Miguel Beauchamp. Please refer to the physician s dictation for a more complete history and impressions.     DIAGNOSIS/REASON FOR REFERRAL  Dysphonia s/p CVA / Evaluate, perform laryngeal exam, treat as appropriate    HISTORY OF VOICE DISORDER  Mr. Mendiola is a 51 year old gentleman with a history of a basal ganglia hemorrhagic stroke on 12/29/16.  Salient details of his history are as follows:    Stroke occurred when travelling with family on the [facility]    Following initial care [facility] in [place], rehabilitation was transferred to [facility] 1/6/17    Extensive services including PT, OT, and SLP, and strong progress to date    Anomia is greatly improving, with only one episode of difficulty word finding per day per patient report    He noted changes to voice and speech immediately following the stroke    Although limited during this period of recovery, he has high vocal demands at his job, and is anavocational avila     In addition to regular practice of dysarthria exercises provided during rehabilitation, he has been practicing voice exercises that he found online    He feels that his voice is improving over time but has not returned to baseline.    CURRENT SYMPTOMS INCLUDE:  VOICE    Poor voice quality    Worse in the evening    Increased vocal fatigue    Describes voice as sounding \"like he's getting a cold\" or \"like an old man\"    Higher pitch than " "pre-stroke    Difficulty projecting voice    SPEECH    Intermittent slurred speech    SWALLOWING    He is noticing increased oral residue intermittently    Eating normal diet    No sensation of food going down the wrong pipe    He is continued to lose weight (15-20lbs total)    Restricted apetite    OTHER PERTINENT HISTORY    Complex medical history: please also refer to Dr. Beauchamp's dictation.     OBJECTIVE FINDINGS  Patient Supplied Answers To VHI Questionnaire  Voice Handicap Index (VHI-10) 2/9/2017   (F1) My voice makes it difficult for people to hear me. Sometimes   (F2) People have difficulty understanding me in a noisy room. Sometimes   (F8) My voice difficulties restrict my personal and social life. Sometimes   (F9) I feel left out of conversations because of my voice. Almost never   (F10) My voice problem causes me to lose income. Almost never   (P5) I feel as though I have to strain to produce voice. Never   (P6) The clarity of my voice is unpredictable. Sometimes   (E4) My voice problem upsets me. Always   (E6) My voice makes me feel handicapped. Always   (P3) People ask, \"What's wrong with your voice?\" Never   VHI Total Score 18     VOICE AND SPEECH EVALUATION  An evaluation of the voice was accomplished and audio recorded today; salient features are as follows:    Palpation of the laryngeal area: supple musculature with no reported tenderness in the thyrohyoid space.  Appropriate hyolaryngeal elevation noted on swallow    Breathing pattern: shallow and phonation is not coordinated with respiration    Tension is evident: no overt tension visible  VOICE:    Mild, Consistent breathiness    Mild, Intermittent roughness    Mild to moderate, Intermittent strain    Habitual pitch was not formally tested, but is judged to be WNL and appropriate     Moderate aesthenia    Intensity:     Conversational speech - informally judged to be mildly reduced for the setting    Projected speech - patient demonstrated " intermittent difficulty projecting his voice beyond a loud conversational volume initially.  With clinician model he was able to demonstrate appropriately raised volume with minimal increase in strain.    Sustained phonation: reduced strain and roughness compared to overall perceptual ratings.  Consistent across vowel contexts.    Pitch Glide task : patient is able to adequately glide from modal to upper register, but demonstrates significantly increased perceptual strain and facial grimacing with elevated pitch.  Intermittent instability noted during register transitions.    Singing vs. Speech - singing demonstrated reduced strain and roughness compared to everyday speech    He states today is a typical voice day    He rates his effort as 0 out of 10 (10 is maximum effort) for speech; 9 out of 10 for singing    He rates overall voice quality as 3 out of 10 (0 being worst)    GLOBAL ASSESSMENT OF DYSPHONIA:  34/100    CAPE-V Overall Severity:  21/100    SPEECH:    Mild consistent distortion on sibilants and sibilant blends    Informally, intelligbility is judged to be minimally affected     COUGH/AIRWAY:    Not observed      LARYNGEAL EXAMINATION  Asaf Manning M.M., M.A., CCC/SLP accomplished the endoscopic laryngeal examination today.    Verbal consent was obtained and witnessed prior to this procedure.   A time-out was performed, verifying patient, procedure, and site.   Type of exam:   Procedure: Flexible endoscopy with chip-tip technology with stroboscopy, right nostril; topical anesthesia with 3% Lidocaine and 0.25% phenylephrine was applied.   This exam shows:    Essentially healthy laryngeal and vocal fold mucosa    Signs of reflux: no remarkable signs of reflux    Secretions:  No significant pooling of secretions with mild diffuse presence of thickened secretions on the vocal folds and throughout the laryngeal area    Vocal fold mucosa:  Mildly concave vibratory margins bilaterally.  Otherwise within  normal limits with no focal abnormalities    Vocal fold function:   o Vocal folds are mobile and meet at midline  o During repetitive tasks and voice use RTVF lags slightly behind LTVF in abduction  o Motion is WFL for both voice and breathing    Airway is adequate    elongation of the vocal folds for pitch increase is normal    Subtle palatal tremor was appreciated during phonation    Moderate four-way constriction of the supraglottic larynx during connected speech    Therapy probes show reduced hyperfunction with forward resonant stimuli    The addition of stroboscopy provided the following information:   o Amplitude: WNL bilaterally  o Mucosal Wave: WNL bilaterally  o Glottic closure:  Complete with slight open phase dominance  o Symmetry:  Intermittent transient asymmetry  o Periodicity: essentially regular      Essentially healthy mucosa.  Mildly concave vibratory margins.      Supraglottic hyperfunction during running speech    I reviewed this laryngeal exam with Mr. Mendiola today, and I provided pertinent explanations, as well as written and oral information.    THERAPEUTIC ACTIVITIES  Today Mr. Mendiola participated in the following therapeutic activities:    Asked many questions about the nature of his symptoms, and I answered all of these thoroughly.    Exercises to promote optimal respiratory mechanics    I provided explanation of the anatomy and physiology of respiration for speech and singing; he found this to be helpful    he demonstrated excessive upper thoracic engagement during inhalation    Demonstrated difficulty allowing abdominal relaxation for inhalation    Practiced in a forward leaning seated posture, with tactile cue of a hand on the low rib-cage to facilitate awareness of low respiratory engagement    With clinician support, patient was able to demonstrate improved abdominal relaxation and engagement on inhalation    Optimal exhalation using inward engagement of the abdominal wall with no  "corresponding collapse of the upper chest cavity was trained using the pulsed \"sh\" task    75% accuracy with minimal clinician support    Semi-Occluded Vocal Tract (SOVT) exercises instructed to reduce laryngeal tension, promote vocal fold pliability, and coordinate respiration and phonation    Straw with water resistance was found to be most facilitating     Sustained phonation, and voice vs. voiceless productions used to promote easy voicing and raise awareness of laryngeal tension    Ascending and descending glides utilized to promote vocal fold pliability    Advanced to /w/ phoneme to promote forward locus of resonance     80% accuracy with minimal clinician support    Exercises to promote glottal efficiency    A modified regimen of vocal function exercises was introduced using afore mentioned SOVT exercises    The importance of maintainingminimal effort, forward locus of resonance, and consistency of airflow before concerns of maximum duration was stressed repeatedly    Maximum phonation time D3-A3 averaged 13 seconds      Concepts of an optimal regimen for practice were instructed.    He should use an interval schedule of practice, with brief periods of practice frequently throughout each day    [place] concepts of volitional practice to facilitate motor learning.    I provided an audio recording and handouts of today's therapeutic activities to facilitate practice.    IMPRESSIONS/ RECOMMENDATIONS/ PLAN  IMPRESSIONS / RECOMMENDATIONS  Based on today's evaluation and laryngeal examination, it appears that:    Essentially healthy laryngeal mucosa with mildly concave vocal fold vibratory margins, subtle sluggish abduction of the RTVF during repetitive tasks, and open phase predominate closure pattern during stroboscopy.    Dysphonia is accounted for by the hyperfunction of the intrinsic and extrinsic laryngeal musculature, likely in response to subtle physiologic changes. This is compounded by non-optimal " coordination of respiration and phonation.    A short course of speech therapy is recommended to optimize vocal technique, improve voice quality and promote reduced discomfort, effort and fatigue, so that the patient is able to meet his personal and professional vocal demands.    He is entirely amenable to this plan    We began therapy today, working on strategies to improve glottic closure and coordinate respiration and phonation    The patient also expressed interest in therapy to address lingering dysarthria and weakness in the orofacial musculature.  As this is outside my primary expertise I will work with the patient to make an appropriate referral.    TREATMENT PLAN  Speech therapy    DURATION/FREQUENCY OF TREATMENT  Three bi-weekly, one-hour sessions, with one monthly one-hour follow-up sessions    PROGNOSIS  Good prognosis for voice improvement with speech therapy and regular practice of therapeutic activities.    BARRIERS TO LEARNING/TEACHING AND LEARNING NEEDS  None/Unremarkable    GOALS  Patient goal:   1. To improve and maintain a healthy voice quality  2. To understand the problem and fix it as much as possible    Short-term goal(s): Within the first 4 sessions, Mr. Mendiola:  1. will be able to independently list key factors in maintenance of good vocal hygiene with 80% accuracy, and report on their use outside the therapy room.  2. will utilize silent inhalation with good low-respiratory engagement 75% of the time during therapy tasks with minimal clinician support  3. will demonstrate semi-occluded vocal tract (SOVT) exercises with at least 80% accuracy with no clinician support  4. will accurately identify target vs. habitual voice quality during therapy tasks in 4 out of 5 trials with no clinician support    Long-term goal(s): In 6 months, Mr. Mendiola will:  1. Report a week of typical activities, in which dysphonia does not exceed a level of 2 out of 10, 80% of the time    PRIMARY ICD-10 code:  R49.0  (Dysphonia)    TOTAL SERVICE TIME: 130 minutes  EVALUATION OF VOICE AND RESONANCE: (33060): 40 minutes    TREATMENT (70854): 60 minutes  ENDOSCOPIC LARYNGEAL EXAMINATION WITH STROBOSCOPY (38072): 30 minutes  NO CHARGE FACILITY FEE (88943)    Asaf Manning M.M., M.A., CCC-SLP  Speech-Language Pathologist  Certificate of Vocology  [phone]

## 2017-02-10 ENCOUNTER — HOSPITAL ENCOUNTER (OUTPATIENT)
Dept: PHYSICAL THERAPY | Facility: CLINIC | Age: 52
Setting detail: THERAPIES SERIES
End: 2017-02-10
Attending: PHYSICAL MEDICINE & REHABILITATION
Payer: COMMERCIAL

## 2017-02-10 ENCOUNTER — HOSPITAL ENCOUNTER (OUTPATIENT)
Dept: OCCUPATIONAL THERAPY | Facility: CLINIC | Age: 52
Setting detail: THERAPIES SERIES
End: 2017-02-10
Attending: PHYSICAL MEDICINE & REHABILITATION
Payer: COMMERCIAL

## 2017-02-10 PROCEDURE — 97110 THERAPEUTIC EXERCISES: CPT | Mod: GP | Performed by: PHYSICAL THERAPIST

## 2017-02-10 PROCEDURE — 97112 NEUROMUSCULAR REEDUCATION: CPT | Mod: GO | Performed by: OCCUPATIONAL THERAPIST

## 2017-02-10 PROCEDURE — 40000719 ZZHC STATISTIC PT DEPARTMENT NEURO VISIT: Performed by: PHYSICAL THERAPIST

## 2017-02-10 PROCEDURE — 97032 APPL MODALITY 1+ESTIM EA 15: CPT | Mod: GO | Performed by: OCCUPATIONAL THERAPIST

## 2017-02-10 PROCEDURE — 97110 THERAPEUTIC EXERCISES: CPT | Mod: GO | Performed by: OCCUPATIONAL THERAPIST

## 2017-02-10 PROCEDURE — 40000125 ZZHC STATISTIC OT OUTPT VISIT: Performed by: OCCUPATIONAL THERAPIST

## 2017-02-10 PROCEDURE — 97116 GAIT TRAINING THERAPY: CPT | Mod: GP | Performed by: PHYSICAL THERAPIST

## 2017-02-14 ENCOUNTER — HOSPITAL ENCOUNTER (OUTPATIENT)
Dept: OCCUPATIONAL THERAPY | Facility: CLINIC | Age: 52
Setting detail: THERAPIES SERIES
End: 2017-02-14
Attending: PHYSICAL MEDICINE & REHABILITATION
Payer: COMMERCIAL

## 2017-02-14 ENCOUNTER — HOSPITAL ENCOUNTER (OUTPATIENT)
Dept: PHYSICAL THERAPY | Facility: CLINIC | Age: 52
Setting detail: THERAPIES SERIES
End: 2017-02-14
Attending: PHYSICAL MEDICINE & REHABILITATION
Payer: COMMERCIAL

## 2017-02-14 PROCEDURE — 97112 NEUROMUSCULAR REEDUCATION: CPT | Mod: GO | Performed by: OCCUPATIONAL THERAPIST

## 2017-02-14 PROCEDURE — 97535 SELF CARE MNGMENT TRAINING: CPT | Mod: GO | Performed by: OCCUPATIONAL THERAPIST

## 2017-02-14 PROCEDURE — 40000125 ZZHC STATISTIC OT OUTPT VISIT: Performed by: OCCUPATIONAL THERAPIST

## 2017-02-14 PROCEDURE — 97032 APPL MODALITY 1+ESTIM EA 15: CPT | Mod: GO | Performed by: OCCUPATIONAL THERAPIST

## 2017-02-14 PROCEDURE — 97116 GAIT TRAINING THERAPY: CPT | Mod: GP | Performed by: PHYSICAL THERAPIST

## 2017-02-14 PROCEDURE — 40000719 ZZHC STATISTIC PT DEPARTMENT NEURO VISIT: Performed by: PHYSICAL THERAPIST

## 2017-02-16 ENCOUNTER — HOSPITAL ENCOUNTER (OUTPATIENT)
Dept: OCCUPATIONAL THERAPY | Facility: CLINIC | Age: 52
Setting detail: THERAPIES SERIES
End: 2017-02-16
Attending: PHYSICAL MEDICINE & REHABILITATION
Payer: COMMERCIAL

## 2017-02-16 PROCEDURE — 97112 NEUROMUSCULAR REEDUCATION: CPT | Mod: GO | Performed by: OCCUPATIONAL THERAPIST

## 2017-02-16 PROCEDURE — 40000125 ZZHC STATISTIC OT OUTPT VISIT: Performed by: OCCUPATIONAL THERAPIST

## 2017-02-16 PROCEDURE — 97032 APPL MODALITY 1+ESTIM EA 15: CPT | Mod: GO | Performed by: OCCUPATIONAL THERAPIST

## 2017-02-16 PROCEDURE — 97530 THERAPEUTIC ACTIVITIES: CPT | Mod: GO | Performed by: OCCUPATIONAL THERAPIST

## 2017-02-16 NOTE — PROGRESS NOTES
02/16/17 0800   Signing Clinician's Name / Credentials   Signing clinician's name / credentials Laura Johns OTR/L, ATP   Session Number   Session Number 10/estimated 30  ;Limited to 60 COMBINED PT/OT/SLP visits per CALENDAR YEAR, HARD MAX, 0 USED.   Progress/Recertification   Progress Note Due 04/19/17   OT Goal 1   Goal Identifier Long-term Goal   Goal Description Pt will demonstrate use of R UE at at least 50% of baseline AROM and strength to allow for ability to complete tasks such as feed self, complete hygiene and two handed tasks such as IADL in prep for return to work.   Target Date 07/26/17   OT Goal 2   Goal Description Pt will demonstrate gross use of R UE in initial ranges of movement at all joints to use R UE as gross assist to his L UE and for unilateral tasks such as shutting off lights, grossly lifting R UE and pushing into sleeve of shirt   Target Date 04/19/17   OT Goal 3   Goal Description Pt will demonstrate understanding of a HEP for R UE ROM/stretching to prevent contracture for optimal AROM as needed for ADL/IADL   Target Date 04/19/17   OT Goal 4   Goal Description Pt will demonstrate ability to use R hand for gross grasp and release of at least 2 inch objects 5x times in a row as needed for ADL/IADL with compliance with HEP with NMES HEP.   Target Date 04/19/17   OT Goal 5   Goal Description Pt will demonstrate functional attentional processing and recall as needed to return to safe ADL/IADL management including community mobility as measured by ability to complete divided attention visual attention task on sendwithus scanboard WFL.   Target Date 04/19/17   Subjective Report   Subjective Report Patient reoports lots of improvement.  Able to move hand from lap laterally outward to bed or other surface and able to lock elbow with weightbearing.     Electrical Stimulation   Minutes 30 Minutes   Skilled Intervention NMES to R finger and thumb extensors alternating with shoulder to asssist  "with pain mgmt and subluxation.   Patient Response Patient reporting missing one dose of bacolfen yesterday and noticed spasms in hand and foot.   Treatment Detail NMES applied with 2 inch electrodes placed on dorsal R forearm at extensor motor point and on upper right trap and deltoid at 35 pps. 300 pulse duration, 2\" ramp, 5\" on and 15\" off time alternating at intensity up to 33 at both sites. Assited with facilitation of movement at shoulder and wrist flexor/extensor stretch.  Also discussed pain experiencing at night.  Trialed arm sling but didn't like it.  Also didn't like either shoulder or wrist kinesiotape.  Ordered neoprene shoulder cuff for sleeping.  Discussed taking anxiety medication before bed as this may be also keeping patient up at night with ruminating thoughts about not sleeping and pain/arm mgmt.    Progress goals progressing- working to obtain home unit for daily use.   Neuromuscular Re-education   Minutes 15 Minutes   Skilled Intervention UE weightbearing   Patient Response Patient reporting he can now support his arm with lateral weightbearing and doesnt have to support with L ue.   Treatment Detail Functional weightbearing prone on mat.  First on elbows, attempted full cobra but painful with hip extension.  Patient then able to complete quadraped with minimal support for R UE f/ therapist.  Assitance with correct form and tech, patietn able to demo extension of elbow to support himself. Tolerated about 1 min two times.  Added this to  HEP. Minimal pain in limb.    Progress goals progressing   Therapeutic Activity   Minutes 15 Minutes   Skilled Intervention UE arm bike   Patient Response Patient now has bike at home but hasnt used it.   Treatment Detail Patient tolerated 11-12 minutes of bilateral UE arm biking.  Using bilateral UE for more revolutions and then R Ue only in order to iniate more movement.  Rest needed after completion due to fatigue.  Wrap needed in order to keep hand  on " bike.   Progress goals progressing   Education   Learner Patient   Readiness Acceptance;Eager   Method Explanation;Demonstration   Response Verbalizes understanding;Demonstrates understanding   Plan   Homework Shoulder flex and ext rot ROM/stretches in supine and active scapular protraction-self-assisted in supine.  WEightbearing, lateral weight shifts and quadraped; supine core work; supine elbow ext.  Tabletop shoulder flexion/ext -protraction/rectraction and internal external rotation.  Active/ self assisted wrist flex/ext, pro/sup, and finger flex.   Plan for next session arm ergometry; NMES to R shoulder and hand; add to HEP to assure pt is completing ROM of all joints of R UE and to add active weight bearing in R hand while seated and other neuro re-educ techniques. Later: CAM as indicated; monitor for need for splint for R hand for contracture prevention   Comments   Comments Progress Note- Patient seen for 10 sessions.  Focus has been on right upper extremity function.  Dynavision not address this period as not yet appropriate.  Patient now able to tolerate 10 minutes standing at Ue arm bike with R arm for forward and backwards movement with use of ramírez glove for  assistance.  Patient now has these items at home.  Patient also working on completing HEP that includes. Shoulder flex and ext rot ROM/stretches in supine and active scapular protraction-self-assisted in supine.  WEightbearing, lateral weight shifts and quadraped; supine core work; supine elbow ext.  Tabletop shoulder flexion/ext -protraction/rectraction and internal external rotation.  Active/ self assisted wrist flex/ext, pro/sup, and finger flex.  Patient reports compliance with this.  Evident by new movements daily.  Now able to extend elbow to suppport self in quadraped.  Also is not able to flex fingers and supinate hand.  Patient also using NMES to facilitate functional neuro re-ed at shoulder, and wrist flexors/extensors.  Shoulder pain  secondary to subluxation-trialed sling and kinesiotape- now trialing neoprene sleeve and estim.  Practice of functional grasp and release with nmes, able to grasp tennis ball with assistance.  Patient now also able to move Right arm from lap and behind him without having to use L UE for gross assist.  Continued focus on above stated goals needed in order to continue to progress fucntional progress with recovery of R arm function.   Total Session Time   Total Session Time 60   Electronically signed by:  Laura WALKER/MURRAY, ATP       Occupational Therapist, [employer] Professional  [phone]      fax: [phone]      [email or web address]  Wheel[facility] Seating [facility]  [facility], [address]  [place] [address]

## 2017-02-17 ENCOUNTER — HOSPITAL ENCOUNTER (OUTPATIENT)
Dept: OCCUPATIONAL THERAPY | Facility: CLINIC | Age: 52
Setting detail: THERAPIES SERIES
End: 2017-02-17
Attending: PHYSICAL MEDICINE & REHABILITATION
Payer: COMMERCIAL

## 2017-02-17 ENCOUNTER — HOSPITAL ENCOUNTER (OUTPATIENT)
Dept: PHYSICAL THERAPY | Facility: CLINIC | Age: 52
Setting detail: THERAPIES SERIES
End: 2017-02-17
Attending: PHYSICAL MEDICINE & REHABILITATION
Payer: COMMERCIAL

## 2017-02-17 PROCEDURE — 97112 NEUROMUSCULAR REEDUCATION: CPT | Mod: GO | Performed by: OCCUPATIONAL THERAPIST

## 2017-02-17 PROCEDURE — 97032 APPL MODALITY 1+ESTIM EA 15: CPT | Mod: GO | Performed by: OCCUPATIONAL THERAPIST

## 2017-02-17 PROCEDURE — 97110 THERAPEUTIC EXERCISES: CPT | Mod: GP | Performed by: PHYSICAL THERAPIST

## 2017-02-17 PROCEDURE — 97116 GAIT TRAINING THERAPY: CPT | Mod: GP | Performed by: PHYSICAL THERAPIST

## 2017-02-17 PROCEDURE — 40000125 ZZHC STATISTIC OT OUTPT VISIT: Performed by: OCCUPATIONAL THERAPIST

## 2017-02-17 PROCEDURE — 40000719 ZZHC STATISTIC PT DEPARTMENT NEURO VISIT: Performed by: PHYSICAL THERAPIST

## 2017-02-17 PROCEDURE — 97110 THERAPEUTIC EXERCISES: CPT | Mod: GO | Performed by: OCCUPATIONAL THERAPIST

## 2017-02-20 ENCOUNTER — HOSPITAL ENCOUNTER (OUTPATIENT)
Dept: OCCUPATIONAL THERAPY | Facility: CLINIC | Age: 52
Setting detail: THERAPIES SERIES
End: 2017-02-20
Attending: PHYSICAL MEDICINE & REHABILITATION
Payer: COMMERCIAL

## 2017-02-20 ENCOUNTER — HOSPITAL ENCOUNTER (OUTPATIENT)
Dept: PHYSICAL THERAPY | Facility: CLINIC | Age: 52
Setting detail: THERAPIES SERIES
End: 2017-02-20
Attending: PHYSICAL MEDICINE & REHABILITATION
Payer: COMMERCIAL

## 2017-02-20 PROCEDURE — 97116 GAIT TRAINING THERAPY: CPT | Mod: GP | Performed by: PHYSICAL THERAPIST

## 2017-02-20 PROCEDURE — 40000125 ZZHC STATISTIC OT OUTPT VISIT: Performed by: OCCUPATIONAL THERAPIST

## 2017-02-20 PROCEDURE — 97110 THERAPEUTIC EXERCISES: CPT | Mod: GP | Performed by: PHYSICAL THERAPIST

## 2017-02-20 PROCEDURE — 97112 NEUROMUSCULAR REEDUCATION: CPT | Mod: GO | Performed by: OCCUPATIONAL THERAPIST

## 2017-02-20 PROCEDURE — 40000719 ZZHC STATISTIC PT DEPARTMENT NEURO VISIT: Performed by: PHYSICAL THERAPIST

## 2017-02-20 PROCEDURE — 97032 APPL MODALITY 1+ESTIM EA 15: CPT | Mod: GO | Performed by: OCCUPATIONAL THERAPIST

## 2017-02-22 ENCOUNTER — HOSPITAL ENCOUNTER (OUTPATIENT)
Dept: OCCUPATIONAL THERAPY | Facility: CLINIC | Age: 52
Setting detail: THERAPIES SERIES
End: 2017-02-22
Attending: PHYSICAL MEDICINE & REHABILITATION
Payer: COMMERCIAL

## 2017-02-22 ENCOUNTER — OFFICE VISIT (OUTPATIENT)
Dept: FAMILY MEDICINE | Facility: CLINIC | Age: 52
End: 2017-02-22
Payer: COMMERCIAL

## 2017-02-22 ENCOUNTER — TELEPHONE (OUTPATIENT)
Dept: FAMILY MEDICINE | Facility: CLINIC | Age: 52
End: 2017-02-22

## 2017-02-22 VITALS
TEMPERATURE: 98.2 F | HEART RATE: 78 BPM | SYSTOLIC BLOOD PRESSURE: 120 MMHG | DIASTOLIC BLOOD PRESSURE: 70 MMHG | BODY MASS INDEX: 22.25 KG/M2 | WEIGHT: 164.25 LBS | HEIGHT: 72 IN

## 2017-02-22 DIAGNOSIS — M62.58 MUSCLE ATROPHY OF UPPER EXTREMITY: ICD-10-CM

## 2017-02-22 DIAGNOSIS — R53.1 WEAKNESS OF RIGHT SIDE OF BODY: ICD-10-CM

## 2017-02-22 DIAGNOSIS — F51.01 PRIMARY INSOMNIA: Primary | ICD-10-CM

## 2017-02-22 PROCEDURE — 40000125 ZZHC STATISTIC OT OUTPT VISIT: Performed by: OCCUPATIONAL THERAPIST

## 2017-02-22 PROCEDURE — 97032 APPL MODALITY 1+ESTIM EA 15: CPT | Mod: GO | Performed by: OCCUPATIONAL THERAPIST

## 2017-02-22 PROCEDURE — 99214 OFFICE O/P EST MOD 30 MIN: CPT | Performed by: PHYSICIAN ASSISTANT

## 2017-02-22 PROCEDURE — 97110 THERAPEUTIC EXERCISES: CPT | Mod: GO | Performed by: OCCUPATIONAL THERAPIST

## 2017-02-22 RX ORDER — MIRTAZAPINE 7.5 MG/1
7.5 TABLET, FILM COATED ORAL AT BEDTIME
Qty: 60 TABLET | Refills: 1 | Status: SHIPPED | OUTPATIENT
Start: 2017-02-22 | End: 2017-02-28

## 2017-02-22 RX ORDER — PROPRANOLOL HYDROCHLORIDE 20 MG/1
20 TABLET ORAL PRN
COMMUNITY
End: 2017-02-28

## 2017-02-22 ASSESSMENT — ANXIETY QUESTIONNAIRES
1. FEELING NERVOUS, ANXIOUS, OR ON EDGE: SEVERAL DAYS
5. BEING SO RESTLESS THAT IT IS HARD TO SIT STILL: SEVERAL DAYS
6. BECOMING EASILY ANNOYED OR IRRITABLE: NOT AT ALL
GAD7 TOTAL SCORE: 5
7. FEELING AFRAID AS IF SOMETHING AWFUL MIGHT HAPPEN: SEVERAL DAYS
3. WORRYING TOO MUCH ABOUT DIFFERENT THINGS: SEVERAL DAYS
2. NOT BEING ABLE TO STOP OR CONTROL WORRYING: SEVERAL DAYS

## 2017-02-22 ASSESSMENT — PATIENT HEALTH QUESTIONNAIRE - PHQ9: 5. POOR APPETITE OR OVEREATING: NOT AT ALL

## 2017-02-22 NOTE — PROGRESS NOTES
"  SUBJECTIVE:                                                    Maldonado Mendiola is a 51 year old male who presents to clinic today for the following health issues:        Depression and Anxiety Follow-Up    Status since last visit: Worsened since stroke 5-6 weeks ago    Other associated symptoms:sleeping    Complicating factors:     Significant life event: Yes-  Had a stroke 7 weeks ago. He has taken propranolol which he had and that helps with anxiety but not with sleeping problem. Has taken simply sleep and not helping either.    Current substance abuse: None    PHQ-9 SCORE 7/17/2012   Total Score 4     NICOLE-7 SCORE 7/17/2012 5/11/2014   Total Score 2 1                    Amount of exercise or physical activity: He is participating in OT and PT.     Problems taking medications regularly: No    Medication side effects: none  Diet: regular (no restrictions)    Patient states that his main issue seems to be surrounding his lack of sleep. He has not had a good nights sleep in weeks and last night was the worst and the most concerned, which is why he requested the appointment today. He states that he will feel tired, but when he goes to lay down he cannot sleep. His wife endorses this as well, stating that he cannot sleep. He feels that he is not doing as well in his therapies because he is so tired. He admits to some anxiety and depression since his stroke. He states that he has been \"down in the dumps\" but has cried off and on. He denies SI/HI.       Problem list and histories reviewed & adjusted, as indicated.  Additional history: as documented    Patient Active Problem List   Diagnosis     Esophageal reflux     CARDIOVASCULAR SCREENING; LDL GOAL LESS THAN 160     Panic attack     History of stroke     Hemorrhagic stroke (H)     Right hemiparesis (H)     Dysarthria     Cognitive deficits as late effect of cerebrovascular disease     Cognitive and behavioral changes     Slow transit constipation     Adjustment " insomnia     History reviewed. No pertinent past surgical history.    Social History   Substance Use Topics     Smoking status: Never Smoker     Smokeless tobacco: Never Used     Alcohol use Yes      Comment: 6 pack per week     Family History   Problem Relation Age of Onset     Alcohol/Drug Paternal Grandmother      CEREBROVASCULAR DISEASE No family hx of      Breast Cancer No family hx of      Cancer - colorectal No family hx of      Prostate Cancer No family hx of          Current Outpatient Prescriptions   Medication Sig Dispense Refill     propranolol (INDERAL) 20 MG tablet Take 20 mg by mouth as needed       mirtazapine (REMERON) 7.5 MG TABS tablet Take 1 tablet (7.5 mg) by mouth At Bedtime 60 tablet 1     lisinopril (PRINIVIL/ZESTRIL) 10 MG tablet Take 1 tablet (10 mg) by mouth daily 90 tablet 0     diphenhydrAMINE (BENADRYL) 25 MG capsule Take 1-2 capsules (25-50 mg) by mouth nightly as needed for sleep       baclofen (LIORESAL) 10 MG tablet Take 0.5-1 tablets (5-10 mg) by mouth 3 times daily 90 tablet 3     No Known Allergies    ROS:  C: NEGATIVE for fever, chills, change in weight  INTEGUMENTARY/SKIN: POSITIVE for dry skin  E/M: NEGATIVE for ear, mouth and throat problems  R: NEGATIVE for significant cough or SOB  CV: NEGATIVE for chest pain, palpitations or peripheral edema  MUSCULOSKELETAL: POSITIVE  for right sided UE muscle atrophy  NEURO: POSITIVE for UE right sided weakness  PSYCHIATRIC: POSITIVE for Insomnia    OBJECTIVE:                                                    /70 (BP Location: Left arm, Cuff Size: Adult Regular)  Pulse 78  Temp 98.2  F (36.8  C) (Oral)  Ht 6' (1.829 m)  Wt 164 lb 4 oz (74.5 kg)  BMI 22.28 kg/m2  Body mass index is 22.28 kg/(m^2).  GENERAL: healthy, alert and no distress  MS: RUE exam shows weakness and muscle atrophy.   PSYCH: mentation appears normal, affect normal/bright, judgement and insight intact and appearance well groomed    Diagnostic Test Results:  none       ASSESSMENT/PLAN:                                                    1. Primary insomnia  Called the Provider to Provider line to speak about this patient. I spoke with Dr. Simon to discuss medications for this patient. She rec'd starting with 7.5 mg of Remeron, if he still cannot sleep or sleep could improve she rec'd increasing the dose to 15mg. This will treat his insomnia and mood simultaneously. Another option that she listed was olanzapine.     Relaying this information to the patient, I instructed that at 7P tonight take 1 tab. Shortly after he should feel drowsy and get ready for bed. IF he is still not experiencing any drowsiness after 60-90 minutes he can take another tab (15mg total). He should do this for the next 3-5 days and if he notices that he continually needs 15mg, he should go with that dosing. If insomnia persists for the next week, he should present back to clinic for another option.     Discussed reasons to be seen in ED.     - mirtazapine (REMERON) 7.5 MG TABS tablet; Take 1 tablet (7.5 mg) by mouth At Bedtime  Dispense: 60 tablet; Refill: 1    2. Muscle atrophy of upper extremity    30 minutes was spent with the patient of which 1/2 was spent counseling.     I have discussed any lab or imaging results, the patient's diagnosis, and my plan of treatment with the patient and/or family. Patient is aware to come back in with worsening symptoms or if no relief despite treatment plan.  Patient voiced understanding and had no further questions.     Dennise Hall PA-C  [facility]

## 2017-02-22 NOTE — MR AVS SNAPSHOT
After Visit Summary   2/22/2017    Maldonado Mendiola    MRN: [id number]           Patient Information     Date Of Birth          1965        Visit Information        Provider Department      2/22/2017 1:40 PM Dennise Hall PA-C [facility]        Today's Diagnoses     Primary insomnia    -  1    Muscle atrophy of upper extremity           Follow-ups after your visit        Your next 10 appointments already scheduled     Feb 24, 2017  8:00 AM CST   Treatment 60 with Raghav Matute, OT   [facility] [place], Occupational Therapy - Outpatient ([facility])    [address]  [place] [address]   [phone]            Feb 24, 2017  9:00 AM CST   Treatment 45 with Rochelle Capone, PT   [facility] [place], Physical Therapy - Outpatient ([facility])    [address]  [place] [address]   [phone]            Feb 24, 2017  3:20 PM CST   SHORT with Edmond Cano PA-C   [facility] ([facility])    [address] [place] [id number]   [phone]            Feb 27, 2017 11:45 AM CST   Evaluation with Jessie Skaggs, SLP   [facility] [place], Speech Therapy - Oupatient ([facility])    [address]  [place] [address]   [phone]            Feb 28, 2017  8:00 AM CST   Treatment 60 with Raghav Matute, OT   [facility] [place] [place] ([facility])    [address]  [place] [address]   [phone]            Feb 28, 2017  9:00 AM CST   Treatment 45 with Rochelle Capone, PT   [facility] ([facility])    [address]  [place] [address]   [phone]            Mar 03,  2017  8:30 AM CST   Treatment 60 with Laura Johns, OT   [facility] [place], Occupational Therapy - Outpatient ([facility])    [address]  [place] [address]   [phone]            Mar 03, 2017  9:30 AM CST   Treatment 45 with Rochelle Capone, PT   [facility] [place], Physical Therapy - Outpatient ([facility])    [address]  [place] [address]   [phone]            Mar 07, 2017  8:00 AM CST   Treatment 60 with Raghav Matute, OT   [facility] [place], Occupational Therapy - Outpatient ([facility])    [address]  [place] [address]   [phone]            Mar 07, 2017  9:00 AM CST   Treatment 45 with Rochelle Capone, PT   [facility] [place], Physical Therapy - Outpatient ([facility])    [address]  [place] [address]   [phone]              Who to contact     If you have questions or need follow up information about today's clinic visit or your schedule please contact [facility] directly at [phone].  Normal or non-critical lab and imaging results will be communicated to you by [employer], letter or phone within 4 business days after the clinic has received the results. If you do not hear from us within 7 days, please contact the clinic through [employer] or phone. If you have a critical or abnormal lab result, we will notify you by phone as soon as possible.  Submit refill requests through [employer] or call your pharmacy and they will forward the refill request to us. Please allow 3 business days for your refill to be completed.          Additional Information About Your Visit        [employer] Information     [employer] lets you send messages to your doctor, view your test results, renew your prescriptions, schedule appointments and more. To sign up, go  "to [email or web address] . Click on \"Log in\" on the left side of the screen, which will take you to the Welcome page. Then click on \"Sign up Now\" on the right side of the page.     You will be asked to enter the access code listed below, as well as some personal information. Please follow the directions to create your username and password.     Your access code is: [email or web address]  Expires: 2017 12:15 PM     Your access code will  in 90 days. If you need help or a new code, please call your [facility] clinic or [phone].        Care EveryWhere ID     This is your Care EveryWhere ID. This could be used by other organizations to access your [facility] medical records  [id number]        Your Vitals Were     Pulse Temperature Height BMI (Body Mass Index)          78 98.2  F (36.8  C) (Oral) 6' (1.829 m) 22.28 kg/m2         Blood Pressure from Last 3 Encounters:   17 120/70   17 108/68   17 108/58    Weight from Last 3 Encounters:   17 164 lb 4 oz (74.5 kg)   17 158 lb 3.2 oz (71.8 kg)   09/18/15 190 lb 3.2 oz (86.3 kg)              Today, you had the following     No orders found for display         Today's Medication Changes          These changes are accurate as of: 17  2:24 PM.  If you have any questions, ask your nurse or doctor.               Start taking these medicines.        Dose/Directions    mirtazapine 7.5 MG Tabs tablet   Commonly known as:  REMERON   Used for:  Primary insomnia   Started by:  Dennise Hall PA-C        Dose:  7.5 mg   Take 1 tablet (7.5 mg) by mouth At Bedtime   Quantity:  60 tablet   Refills:  1            Where to get your medicines      These medications were sent to [id number] - [place], [place] - [address] AT [address], [place] [address]     Phone:  [phone]     mirtazapine 7.5 MG Tabs tablet                Primary Care Provider Office Phone # Fax " Judie Cano PA-C [phone]       [facility] [address] [address]        Thank you!     Thank you for choosing [facility]  for your care. Our goal is always to provide you with excellent care. Hearing back from our patients is one way we can continue to improve our services. Please take a few minutes to complete the written survey that you may receive in the mail after your visit with us. Thank you!             Your Updated Medication List - Protect others around you: Learn how to safely use, store and throw away your medicines at www.disposemymeds.org.          This list is accurate as of: 2/22/17  2:24 PM.  Always use your most recent med list.                   Brand Name Dispense Instructions for use    baclofen 10 MG tablet    LIORESAL    90 tablet    Take 0.5-1 tablets (5-10 mg) by mouth 3 times daily       diphenhydrAMINE 25 MG capsule    BENADRYL     Take 1-2 capsules (25-50 mg) by mouth nightly as needed for sleep       lisinopril 10 MG tablet    PRINIVIL/ZESTRIL    90 tablet    Take 1 tablet (10 mg) by mouth daily       mirtazapine 7.5 MG Tabs tablet    REMERON    60 tablet    Take 1 tablet (7.5 mg) by mouth At Bedtime       propranolol 20 MG tablet    INDERAL     Take 20 mg by mouth as needed

## 2017-02-22 NOTE — TELEPHONE ENCOUNTER
"    Maldonado Mendiola is a 51 year old male who calls with depression.    NURSING ASSESSMENT:  Description:  Called and spoke with patient. About once a day he has a suicidal thought without a plan. He has anxiety as well. His biggest issue is not sleeping and physical exhaustion. He said that since his stroke, he has struggled with these thoughts. He denies a plan for suicide, intent to harm others, self injury, overdose, altered mental status, current suicidal thoughts, new onset of delusional ideas, past inpatient admission for depression etc.   Onset/duration:  Since his stroke  Precip. factors:  Stroke  Associated symptoms:  Exhaustion  Improves/worsens symptoms:  Exhaustion worsens depression  Allergies: No Known Allergies        NURSING PLAN: Huddle with provider, plan includes keep appointment as scheduled    RECOMMENDED DISPOSITION:  See in 4 hours-appointment scheduled  Will comply with recommendation: Yes  If further questions/concerns or if symptoms do not improve, worsen or new symptoms develop, call your PCP or [facility] as soon as possible.      Guideline used:  Telephone Triage Protocols for Nurses, Fifth Edition, Idalia Howe \"depression\"    Manuel Torres RN      "

## 2017-02-22 NOTE — NURSING NOTE
Chief Complaint   Patient presents with     MOOD CHANGES       Initial /70 (BP Location: Left arm, Cuff Size: Adult Regular)  Pulse 78  Temp 98.2  F (36.8  C) (Oral)  Ht 6' (1.829 m)  Wt 164 lb 4 oz (74.5 kg)  BMI 22.28 kg/m2 Estimated body mass index is 22.28 kg/(m^2) as calculated from the following:    Height as of this encounter: 6' (1.829 m).    Weight as of this encounter: 164 lb 4 oz (74.5 kg).  Medication Reconciliation: kenisha ISAAC, Certified Medical Assistant (AAMA)February 22, 2017 1:39 PM

## 2017-02-22 NOTE — TELEPHONE ENCOUNTER
Patients wife calling to make an appointment for her .    Huddled with alek Bowden provider to make sure she was comfortable seeing patient.    Dennise wanted to know if patient was suicidal.  I asked wife and she said she had asked him this morning and he said he has had thoughts of it.      Patient is not home right now he it at therapy.  He will be home in about 1 hour.    Dr Anguiano advised me to have Team Danilo RN call patient and triage.    Cari Harman

## 2017-02-22 NOTE — TELEPHONE ENCOUNTER
Called and spoke with wife. Patient will be home around 1030 am. He does not have a cell phone. Will try at 1030.    Manuel Torres RN

## 2017-02-23 ASSESSMENT — ANXIETY QUESTIONNAIRES: GAD7 TOTAL SCORE: 5

## 2017-02-23 ASSESSMENT — PATIENT HEALTH QUESTIONNAIRE - PHQ9: SUM OF ALL RESPONSES TO PHQ QUESTIONS 1-9: 18

## 2017-02-24 ENCOUNTER — HOSPITAL ENCOUNTER (OUTPATIENT)
Dept: PHYSICAL THERAPY | Facility: CLINIC | Age: 52
Setting detail: THERAPIES SERIES
End: 2017-02-24
Attending: PHYSICAL MEDICINE & REHABILITATION
Payer: COMMERCIAL

## 2017-02-24 ENCOUNTER — HOSPITAL ENCOUNTER (OUTPATIENT)
Dept: OCCUPATIONAL THERAPY | Facility: CLINIC | Age: 52
Setting detail: THERAPIES SERIES
End: 2017-02-24
Attending: PHYSICAL MEDICINE & REHABILITATION
Payer: COMMERCIAL

## 2017-02-24 PROCEDURE — 97535 SELF CARE MNGMENT TRAINING: CPT | Mod: GO | Performed by: OCCUPATIONAL THERAPIST

## 2017-02-24 PROCEDURE — 97110 THERAPEUTIC EXERCISES: CPT | Mod: GO | Performed by: OCCUPATIONAL THERAPIST

## 2017-02-24 PROCEDURE — 40000125 ZZHC STATISTIC OT OUTPT VISIT: Performed by: OCCUPATIONAL THERAPIST

## 2017-02-24 PROCEDURE — 97116 GAIT TRAINING THERAPY: CPT | Mod: GP | Performed by: PHYSICAL THERAPIST

## 2017-02-24 PROCEDURE — 97112 NEUROMUSCULAR REEDUCATION: CPT | Mod: GP | Performed by: PHYSICAL THERAPIST

## 2017-02-24 PROCEDURE — 40000719 ZZHC STATISTIC PT DEPARTMENT NEURO VISIT: Performed by: PHYSICAL THERAPIST

## 2017-02-24 PROCEDURE — 97112 NEUROMUSCULAR REEDUCATION: CPT | Mod: GO | Performed by: OCCUPATIONAL THERAPIST

## 2017-02-27 ENCOUNTER — HOSPITAL ENCOUNTER (OUTPATIENT)
Dept: SPEECH THERAPY | Facility: CLINIC | Age: 52
Setting detail: THERAPIES SERIES
End: 2017-02-27
Attending: PHYSICAL MEDICINE & REHABILITATION
Payer: COMMERCIAL

## 2017-02-27 PROCEDURE — 92522 EVALUATE SPEECH PRODUCTION: CPT | Mod: GN | Performed by: SPEECH-LANGUAGE PATHOLOGIST

## 2017-02-27 PROCEDURE — 92507 TX SP LANG VOICE COMM INDIV: CPT | Mod: GN | Performed by: SPEECH-LANGUAGE PATHOLOGIST

## 2017-02-27 PROCEDURE — 40000230 ZZH STATISTIC SPEECH FACIAL PARALYSIS VISIT: Performed by: SPEECH-LANGUAGE PATHOLOGIST

## 2017-02-27 NOTE — PROGRESS NOTES
" Speech Pathology/Facial Paralysis Evaluation - 02/27/17 1100   Visit Type   Visit Type Initial   Patient Type   Patient Type Adult   General Patient Information   Start Of Care Date 02/27/17   Referring Physician Miguel Beauchamp MD   Orders Eval And Treat   Orders Date 01/20/17   Medical Diagnosis CVA   Onset Of Illness/injury Or Date Of Surgery 12/28/16   Surgical/Medical History Reviewed Yes   Pertinent History Of Current Problem Per Dr. Beauchamp's medical chart notes (see chart for further details), \"Maldonado Mendiola is a 51 year old right hand dominant male history untreated hypertension with new left basal ganglia stroke 2/2 HTN emergency.  Profound right sided weakness, mild aphasia, right facial droop.  [place], Admitted acute inpatient rehab 01/06/2017.\"   Per patient, \"I had a stroke in [place], I was really close to the hospital, I had a hemorrhagic stroke, so they gave me something to bring my pressure down and they didn't need to do surgery. For the first day or two I was speaking gibberish, by the third day it wasn't gibberish. I was there for a week and then came here, and went straight to Acute Rehab, I had Speech Therapy there. I'm still working on exercises with [employer], tongue twisters that I do every day, online I found a Speech Therapist in [place] and I follow what she says for my tongue and my mouth. I took some sleeping pills last week and since then I've been a little more numb in the lips and tongue and my words may be just a hare sloppier. \"  (Also had videostroboscopy exam and was given voice exercises.)   Functional Problems Occasional leak of liquids in right corner of mouth, food pocketing on right, about once a week lightly bites inside of cheek, reduced sound-sequencing, reduced articulatory precision, eg. 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   Previous Treatment " "Physical/speech therapy;Medications;Acupuncture   General Health HBP   Diagnostic Tests MRI   Occupation [employer]   Sensory Changes Numbness in right lips, tongue, cheek, ear   Pain Description None   Oral Habits Unilateral chewing   Patient's Concerns difficulty drinking ;difficulty eating;altered sense of taste;difficulty whistling   Patient/Family Goals Improve speech production, singing voice and facial function.  (Patient states, \"Pitch is slightly higher, and I'm fuzzy.\")   Evaluation Results: Resting Tone and Symmetry/Oral status   Palpebral Fissure - Type of Eye Tone  Wide  (2 mm)   Nasolabial Fold  Less Pronounced   Lips  - Type of Lip Tone  Drooped  (slightly)   Chin  - Type of Chin Tone  Drooped   Type of Forehead Wrinkles Same   Type of Eye Wrinkles Less   Type of Cheek/Mouth Wrinkles Less   Oral Rest Posture Inferior, dentalized   Lingual Sensation Same bilaterally   Evaluation Results: Forehead Elevation   Evaluation Results: Forehead Elevation 100%   Forehead General Severity of Synkinesis   none   Evaluation Results: Minimal Effort Eye Closure    Complete Yes   General Severity of Synkinesis   none   Evaluation Results: Open Mouth Smile    Open Smile Strength Rating % 80%   Open Smile General Severity of Synkinesis   none   Evaluation Results: Closed Mouth Smile    Closed Mouth Smile Strength Rating % 90%   Closed Mouth Smile General Severity of Synkinesis   none   Evaluation Results: Snarl   Snarl Strength Rating % 98%   Snarl General Severity of Synkinesis   none   Evaluation Results: Smirk   Smirk Strength rating % 80%   Smirk General Severity of Synkinesis   none   Evaluation Results: Pucker   Strength Rating % 90%   General Severity of Synkinesis   none   Evaluation Results: Compression   Strength Rating % 90%   General Severity of Synkinesis   none   Evaluation Results: Contraction   Strength Rating % 95%   General Severity of Synkinesis   (No matching choice found.)   Evaluation Results: " Protrusion   Strength Rating % 90%   General Severity of Synkinesis   none   Evaluation Results: Pout   Strength Rating % 95%   General Severity of Synkinesis   none   Evaluation Results: Frown   Strength Rating % 100%   General Severity of Synkinesis   none   Evaluation Results: Lower Lip Depression   Strength Rating % 100%   General Severity of Synkinesis   none   Evaluation Results: Chin Tone (Mentalis)   Strength Rating % 90%   General Severity of Synkinesis   none   Evaluation Results: Tongue Movement   Tongue Protrusion Deviates to left   Elevation/Depression Extraorally Deviates to left on depression   Elevation/Depression Intraorally (WNL)   Anterior Resistive Strength Reduced    Right Lateral Resistive Strength Reduced    Left Lateral Resistive Strength WNL   Presence of Synkinesis with Lingual Movements None   Evaluation Results: Speech Function   Evaluation Results: Speech Function Reduced ability to maintain optimal oral rest posture;Reduced stability necessary for refined speech;Deviation of lips during speech to left, stronger side;Reduced lip movement on the right;Impaired lingual sounds, (e.g. s, z);Reduced loudness;Other   General Severity of Synkinesis with Sound-Lip Rounding None   General Severity of Synkinesis with Sound-Lip Pressure none   General Therapy Interventions   Planned Therapy Interventions Facial Therapy;Improve Facial Tone and Function;Improve Speech Intelligibilty   Clinical Impressions   Facial Grading Score 81.7/100   House-Brackmann Score II/VI   Communication Diagnosis Dysarthira, Impaired Nonverbal Communication   Swallowing Diagnosis Mild oral stage swallowing impairment   Rehab Potential good to achieve stated therapy goal(s)   Therapy Frequency  (1 x/3-4 weeks.)   Predicted Duration of Therapy Intervention (days/weeks) 6 months   Risks and Benefits of Treatment have been explained yes   Patient, family and/or staff in agreement yes   Clinical Impression Comments Patient  presents with moderate dysarthria, mild oral stage swallowing deficits and a mild nonverbal communication deficit.   Facial Paralysis Goals   Facial Paralysis Goals 1;2;3   Facial Paralysis Goal 1   Goal Identifier Speech   Goal Description Patient will report a 25% improvement in ease/clarity of speech production compared to status noted on evaluation (02/27/17).   Target Date 04/27/17   Facial Paralysis Goal 2   Goal Identifier Speech/Facial Function   Goal Description Patient will develop an 80% accurate oral rest posture, involving increased elevation of the tongue tip and margins inside the hard palate with closed lips for structured practice periods of 5-30 minutes, to assist with speech production for lingua-alveolars, oral stage swallowing and nonverbal communication (per patient judgement in comparison to status noted on evaluation of 02/27/17.   Target Date 04/27/17   Facial Paralysis Goal 3   Goal Identifier Nonverbal Communication   Goal Description Patient will demonstrate a 5 point gain on his Facial Grading Score, reflecting gains in orofacial resting tone and voluntay movment per therapist judgement.    Target Date 04/27/17   Education Assessment   Preferred Learning Style Listening;Reading;Demonstration;Pictures/video   Total Evaluation Time   Total Evaluation Time 45

## 2017-02-28 ENCOUNTER — HOSPITAL ENCOUNTER (OUTPATIENT)
Dept: OCCUPATIONAL THERAPY | Facility: CLINIC | Age: 52
Setting detail: THERAPIES SERIES
End: 2017-02-28
Attending: PHYSICAL MEDICINE & REHABILITATION
Payer: COMMERCIAL

## 2017-02-28 ENCOUNTER — OFFICE VISIT (OUTPATIENT)
Dept: FAMILY MEDICINE | Facility: CLINIC | Age: 52
End: 2017-02-28
Payer: COMMERCIAL

## 2017-02-28 ENCOUNTER — HOSPITAL ENCOUNTER (OUTPATIENT)
Dept: PHYSICAL THERAPY | Facility: CLINIC | Age: 52
Setting detail: THERAPIES SERIES
End: 2017-02-28
Attending: PHYSICAL MEDICINE & REHABILITATION
Payer: COMMERCIAL

## 2017-02-28 VITALS
HEIGHT: 72 IN | DIASTOLIC BLOOD PRESSURE: 68 MMHG | SYSTOLIC BLOOD PRESSURE: 110 MMHG | BODY MASS INDEX: 22.89 KG/M2 | WEIGHT: 169 LBS | TEMPERATURE: 98.9 F | HEART RATE: 72 BPM

## 2017-02-28 DIAGNOSIS — R25.2 SPASTICITY: ICD-10-CM

## 2017-02-28 DIAGNOSIS — F41.0 PANIC ATTACK: ICD-10-CM

## 2017-02-28 DIAGNOSIS — G81.90 HEMIPLEGIA AFFECTING DOMINANT SIDE (H): Primary | ICD-10-CM

## 2017-02-28 DIAGNOSIS — G47.00 INSOMNIA, UNSPECIFIED TYPE: ICD-10-CM

## 2017-02-28 DIAGNOSIS — G83.9: ICD-10-CM

## 2017-02-28 PROCEDURE — 97116 GAIT TRAINING THERAPY: CPT | Mod: GP | Performed by: PHYSICAL THERAPIST

## 2017-02-28 PROCEDURE — 40000125 ZZHC STATISTIC OT OUTPT VISIT: Performed by: OCCUPATIONAL THERAPIST

## 2017-02-28 PROCEDURE — 97110 THERAPEUTIC EXERCISES: CPT | Mod: GP | Performed by: PHYSICAL THERAPIST

## 2017-02-28 PROCEDURE — 97032 APPL MODALITY 1+ESTIM EA 15: CPT | Mod: GO | Performed by: OCCUPATIONAL THERAPIST

## 2017-02-28 PROCEDURE — 99214 OFFICE O/P EST MOD 30 MIN: CPT | Performed by: PHYSICIAN ASSISTANT

## 2017-02-28 PROCEDURE — 97112 NEUROMUSCULAR REEDUCATION: CPT | Mod: GO | Performed by: OCCUPATIONAL THERAPIST

## 2017-02-28 PROCEDURE — 40000719 ZZHC STATISTIC PT DEPARTMENT NEURO VISIT: Performed by: PHYSICAL THERAPIST

## 2017-02-28 RX ORDER — HYDROXYZINE HYDROCHLORIDE 25 MG/1
TABLET, FILM COATED ORAL
Qty: 60 TABLET | Refills: 1 | Status: SHIPPED | OUTPATIENT
Start: 2017-02-28 | End: 2017-10-18

## 2017-02-28 RX ORDER — TRAZODONE HYDROCHLORIDE 50 MG/1
TABLET, FILM COATED ORAL
Qty: 30 TABLET | Refills: 1 | Status: SHIPPED | OUTPATIENT
Start: 2017-02-28 | End: 2017-03-27

## 2017-02-28 RX ORDER — PROPRANOLOL HYDROCHLORIDE 20 MG/1
20 TABLET ORAL PRN
Qty: 90 TABLET | Refills: 1 | Status: SHIPPED | OUTPATIENT
Start: 2017-02-28 | End: 2017-10-18

## 2017-02-28 ASSESSMENT — ANXIETY QUESTIONNAIRES
3. WORRYING TOO MUCH ABOUT DIFFERENT THINGS: NOT AT ALL
GAD7 TOTAL SCORE: 8
2. NOT BEING ABLE TO STOP OR CONTROL WORRYING: NOT AT ALL
6. BECOMING EASILY ANNOYED OR IRRITABLE: SEVERAL DAYS
5. BEING SO RESTLESS THAT IT IS HARD TO SIT STILL: NEARLY EVERY DAY
1. FEELING NERVOUS, ANXIOUS, OR ON EDGE: MORE THAN HALF THE DAYS
7. FEELING AFRAID AS IF SOMETHING AWFUL MIGHT HAPPEN: NOT AT ALL

## 2017-02-28 ASSESSMENT — PATIENT HEALTH QUESTIONNAIRE - PHQ9: 5. POOR APPETITE OR OVEREATING: MORE THAN HALF THE DAYS

## 2017-02-28 NOTE — PROGRESS NOTES
SUBJECTIVE:                                                    Maldonado Mendiola is a 52 year old male who presents to clinic today for the following health issues:      Abnormal Mood Symptoms     Onset: 1 1/2 months    Description:   Depression: YES  Anxiety: YES    Accompanying Signs & Symptoms:  Still participating in activities that you used to enjoy: no  Fatigue: YES  Irritability: YES  Difficulty concentrating: no  Changes in appetite: YES  Problems with sleep: YES, huge issues with sleep  Heart racing/beating fast : no  Thoughts of hurting yourself or others: yes     History:   Recent stress: YES- stroke 2 months ago  Prior depression hospitalization: None  Family history of depression: no  Family history of anxiety: YES      Precipitating factors:   Alcohol/drug use: YES    Alleviating factors:  medication       Therapies Tried and outcome: Propranolol - has been helping    Struggling with his right sided hemiplegia due to his stroke. Reports he feels he needs to move, feels claustrophobic, feels like he is tense and tight.     Night times are bad, hard to sleep. Is exhausted but when he lays down, can't sleep, brain won't stop. Worries a lot.    He's having improvements in his right sided hemiplegia. He originally had right sided flaccid paralysis causing foot drop and weakness on the right side arms and leg on the right. This resulted in atrophy in the muscles on that right side. He is having now some slow recovery this past several weeks with improvements in movement but he has obvious continued atrophy.     Patient Active Problem List   Diagnosis     Esophageal reflux     CARDIOVASCULAR SCREENING; LDL GOAL LESS THAN 160     Panic attack     History of stroke     Hemorrhagic stroke (H)     Right hemiparesis (H)     Dysarthria     Cognitive deficits as late effect of cerebrovascular disease     Cognitive and behavioral changes     Slow transit constipation     Adjustment insomnia      Current Outpatient  Prescriptions   Medication     traZODone (DESYREL) 50 MG tablet     FLUoxetine (PROZAC) 20 MG capsule     hydrOXYzine (ATARAX) 25 MG tablet     propranolol (INDERAL) 20 MG tablet     lisinopril (PRINIVIL/ZESTRIL) 10 MG tablet     diphenhydrAMINE (BENADRYL) 25 MG capsule     baclofen (LIORESAL) 10 MG tablet     [DISCONTINUED] propranolol (INDERAL) 20 MG tablet     No current facility-administered medications for this visit.       Problem list and histories reviewed & adjusted, as indicated.  Additional history: as documented    Labs reviewed in EPIC    Reviewed and updated as needed this visit by clinical staff  Tobacco  Allergies  Med Hx  Surg Hx  Fam Hx  Soc Hx      Reviewed and updated as needed this visit by Provider         ROS:  Constitutional, HEENT, cardiovascular, pulmonary, gi and gu systems are negative, except as otherwise noted.    OBJECTIVE:                                                    /68 (Cuff Size: Adult Regular)  Pulse 72  Temp 98.9  F (37.2  C) (Oral)  Ht 6' (1.829 m)  Wt 169 lb (76.7 kg)  BMI 22.92 kg/m2  Body mass index is 22.92 kg/(m^2).  GA: Alert, oriented  Psych: Appropriate appearance.  Alert and oriented times 3; coherent speech, normal   rate and volume, able to articulate logical thoughts, able   to abstract reason, no tangential thoughts, no hallucinations   or delusions.  Normal behavior.  His affect is bright.  MUSC:: Right arm and leg weakness 1/5 strength in all ranges and atrophy of arm on the right and leg on the right.        ASSESSMENT/PLAN:                                                      (G81.90) Hemiplegia affecting dominant side (H)  (primary encounter diagnosis)  Comment:   Plan: Ongoing OT and PT.     (R25.2) Spasticity  Comment:   Plan: hydrOXYzine (ATARAX) 25 MG tablet, propranolol         (INDERAL) 20 MG tablet        Ongoing OT and PT. Meds given     (G83.89) Flaccid paralysis (H)  Comment:   Plan: As noted - ongoing OT and PT     (G47.00) Insomnia,  unspecified type  Comment:   Plan: traZODone (DESYREL) 50 MG tablet, hydrOXYzine         (ATARAX) 25 MG tablet        Due to anxiety and adjustment. Will treat. This prescription is given with a discussion of side effects, risks and proper use.  Instructions are given to follow up if not improving or symptoms change or worsen as discussed.     (F41.0) Panic attack  Comment:   Plan: FLUoxetine (PROZAC) 20 MG capsule, hydrOXYzine         (ATARAX) 25 MG tablet, propranolol (INDERAL) 20        MG tablet        Due to anxiety and adjustment. Would do Lorazepam if these failed to help. This prescription is given with a discussion of side effects, risks and proper use.  Instructions are given to follow up if not improving or symptoms change or worsen as discussed.     25 min visit over 50% counseling given today. Follow up as needed      CARO LOPEZ PA-C  [facility]

## 2017-02-28 NOTE — NURSING NOTE
Chief Complaint   Patient presents with     Depression     Anxiety     Patient Request for Note/Letter     Occupational therapist needs a note from [facility] for a piece of equipment for his arm     Sleep Problem       Initial /68 (Cuff Size: Adult Regular)  Pulse 72  Temp 98.9  F (37.2  C) (Oral)  Ht 6' (1.829 m)  Wt 169 lb (76.7 kg)  BMI 22.92 kg/m2 Estimated body mass index is 22.92 kg/(m^2) as calculated from the following:    Height as of this encounter: 6' (1.829 m).    Weight as of this encounter: 169 lb (76.7 kg).  Medication Reconciliation: complete   Augusta Muniz, Certified Medical Assistant (AAMA)

## 2017-02-28 NOTE — MR AVS SNAPSHOT
After Visit Summary   2/28/2017    Maldonado Mendiola    MRN: [id number]           Patient Information     Date Of Birth          1965        Visit Information        Provider Department      2/28/2017 1:20 PM Edmond Cano PA-C [facility]        Today's Diagnoses     Hemiplegia affecting dominant side (H)    -  1    Spasticity        Flaccid paralysis (H)        Insomnia, unspecified type        Panic attack           Follow-ups after your visit        Your next 10 appointments already scheduled     Mar 03, 2017  8:30 AM CST   Treatment 60 [facility] OT   [facility])    [address]  [place] [address]   [phone]            Mar 03, 2017  9:30 AM CST   Treatment 45 with Rochelle Capone, PT   [facility] ([facility])    [address]  [place] [address]   [phone]            Mar 07, 2017  8:00 AM CST   Treatment 60 with Raghav Matute, OT   [facility])    [address]  [place] [address]   [phone]            Mar 07, 2017  9:00 AM CST   Treatment 45 with Rochelle Capone, PT   [facility])    [address]  [place] [address]   [phone]            Mar 10, 2017  8:30 AM CST   Treatment 60 with Laura Johns, OT   [facility] ([facility])    [address]  [place] [address]   [phone]            Mar 10, 2017  9:30 AM CST   Treatment 45 with Rochelle Capone, PT   [facility] ([facility])    [address]  [place] [address]   [phone]            Mar 13, 2017  9:00 AM CDT   Treatment 60 with Laura Johns OT   [facility] - Outpatient ([facility])    [address], [address]  [place] [address]   [phone]            Mar 13, 2017 10:00 AM CDT   Treatment 45 with Sinai Conner PT   [facility] [place] Physical Therapy - Outpatient ([facility])    [address], [address]  [place] [address]   [phone]            Mar 14, 2017  9:00 AM CDT   (Arrive by 8:45 AM)   New Stroke with Franklin Hidalgo MD   [facility] ([facility])    [address] [place] [id number] [phone]            Mar 15, 2017  8:40 AM CDT   (Arrive by 8:25 AM)   New Patient Visit with Miguel Beauchamp MD   [facility] ([facility])    [address] [place] [id number] [phone]              Who to contact     If you have questions or need follow up information about today's clinic visit or your schedule please contact [facility] directly at [phone].  Normal or non-critical lab and imaging results will be communicated to you by MyChart, letter or phone within 4 business days after the clinic has received the results. If you do not hear from us within 7 days, please contact the clinic through MyChart or phone. If you have a critical or abnormal lab result, we will notify you by phone as soon as possible.  Submit refill requests through [employer] or call your pharmacy and they will forward the refill request to us. Please allow 3 business days for your refill to be completed.          Additional Information About Your Visit        MyChart Information     [facility]  "lets you send messages to your doctor, view your test results, renew your prescriptions, schedule appointments and more. To sign up, go to [email or web address] . Click on \"Log in\" on the left side of the screen, which will take you to the Welcome page. Then click on \"Sign up Now\" on the right side of the page.     You will be asked to enter the access code listed below, as well as some personal information. Please follow the directions to create your username and password.     Your access code is: [email or web address]  Expires: 2017 12:15 PM     Your access code will  in 90 days. If you need help or a new code, please call your [facility] clinic or [phone].        Care EveryWhere ID     This is your Care EveryWhere ID. This could be used by other organizations to access your [facility] medical records  [id number]        Your Vitals Were     Pulse Temperature Height BMI (Body Mass Index)          72 98.9  F (37.2  C) (Oral) 6' (1.829 m) 22.92 kg/m2         Blood Pressure from Last 3 Encounters:   17 110/68   17 120/70   17 108/68    Weight from Last 3 Encounters:   17 169 lb (76.7 kg)   17 164 lb 4 oz (74.5 kg)   17 158 lb 3.2 oz (71.8 kg)              Today, you had the following     No orders found for display         Today's Medication Changes          These changes are accurate as of: 17  2:13 PM.  If you have any questions, ask your nurse or doctor.               Start taking these medicines.        Dose/Directions    FLUoxetine 20 MG capsule   Commonly known as:  PROzac   Used for:  Panic attack   Started by:  Edmond Cano PA-C        Dose:  20 mg   Take 1 capsule (20 mg) by mouth daily   Quantity:  90 capsule   Refills:  1       hydrOXYzine 25 MG tablet   Commonly known as:  ATARAX   Used for:  Spasticity, Insomnia, unspecified type, Panic attack   Started by:  Edmond Cano PA-C        1 to 2 tablets every 6 hours as needed for panic/anxiety "   Quantity:  60 tablet   Refills:  1       traZODone 50 MG tablet   Commonly known as:  DESYREL   Used for:  Insomnia, unspecified type   Started by:  Edmond Cano PA-C        1 to 3 tablets, 30 minutes before bedtime   Quantity:  30 tablet   Refills:  1         These medicines have changed or have updated prescriptions.        Dose/Directions    propranolol 20 MG tablet   Commonly known as:  INDERAL   This may have changed:  additional instructions   Used for:  Spasticity, Panic attack   Changed by:  Edmond Cano PA-C        Dose:  20 mg   Take 1 tablet (20 mg) by mouth as needed Daily for panic / anxiety   Quantity:  90 tablet   Refills:  1         Stop taking these medicines if you haven't already. Please contact your care team if you have questions.     mirtazapine 7.5 MG Tabs tablet   Commonly known as:  REMERON   Stopped by:  Edmond Cano PA-C                Where to get your medicines      These medications were sent to [id number] - [place], [place] - [address] AT [facility] [address]  [address], [place] [address]     Phone:  [phone]     FLUoxetine 20 MG capsule    hydrOXYzine 25 MG tablet    propranolol 20 MG tablet    traZODone 50 MG tablet                Primary Care Provider Office Phone # Fax #    Edmond Cano PA-C [phone]       [facility] [address] [address]        Thank you!     Thank you for choosing [facility]  for your care. Our goal is always to provide you with excellent care. Hearing back from our patients is one way we can continue to improve our services. Please take a few minutes to complete the written survey that you may receive in the mail after your visit with us. Thank you!             Your Updated Medication List - Protect others around you: Learn how to safely use, store and throw away your medicines at  www.disposemymeds.org.          This list is accurate as of: 2/28/17  2:13 PM.  Always use your most recent med list.                   Brand Name Dispense Instructions for use    baclofen 10 MG tablet    LIORESAL    90 tablet    Take 0.5-1 tablets (5-10 mg) by mouth 3 times daily       diphenhydrAMINE 25 MG capsule    BENADRYL     Take 1-2 capsules (25-50 mg) by mouth nightly as needed for sleep       FLUoxetine 20 MG capsule    PROzac    90 capsule    Take 1 capsule (20 mg) by mouth daily       hydrOXYzine 25 MG tablet    ATARAX    60 tablet    1 to 2 tablets every 6 hours as needed for panic/anxiety       lisinopril 10 MG tablet    PRINIVIL/ZESTRIL    90 tablet    Take 1 tablet (10 mg) by mouth daily       propranolol 20 MG tablet    INDERAL    90 tablet    Take 1 tablet (20 mg) by mouth as needed Daily for panic / anxiety       traZODone 50 MG tablet    DESYREL    30 tablet    1 to 3 tablets, 30 minutes before bedtime

## 2017-03-01 DIAGNOSIS — F41.0 PANIC ATTACK: ICD-10-CM

## 2017-03-01 DIAGNOSIS — G81.91 RIGHT HEMIPARESIS (H): Primary | ICD-10-CM

## 2017-03-01 DIAGNOSIS — G47.00 INSOMNIA, UNSPECIFIED TYPE: ICD-10-CM

## 2017-03-01 RX ORDER — LORAZEPAM 1 MG/1
TABLET ORAL
Qty: 20 TABLET | Refills: 1 | Status: CANCELLED | OUTPATIENT
Start: 2017-03-01

## 2017-03-01 ASSESSMENT — PATIENT HEALTH QUESTIONNAIRE - PHQ9: SUM OF ALL RESPONSES TO PHQ QUESTIONS 1-9: 10

## 2017-03-01 ASSESSMENT — ANXIETY QUESTIONNAIRES: GAD7 TOTAL SCORE: 8

## 2017-03-01 NOTE — TELEPHONE ENCOUNTER
Called patient, he said that the trazadone did not work. I told him that he could take 3-4 as stated below. He will try this, and then call back tomorrow if that doesn't work. Will keep open and close in 3 days if we don't hear from patient.    Manuel Torres RN

## 2017-03-01 NOTE — TELEPHONE ENCOUNTER
Reviewed. Clarify if side effects or if didn't work. He can take 3-4 if needed - otherwise, yes, I can do Lorazepam just will have to have someone else sign. I pended a prescription.    Thank you.  Edmond Cano

## 2017-03-01 NOTE — TELEPHONE ENCOUNTER
Reason for Call:  Other prescription    Detailed comments: patient was seen yesterday by [facility] Cano PA-C, told to call back if his medications did not work last night and he could then request Lorazepam to be filled. If ok to fill, please send to [id number] - [place], [place] - [address] AT [facility] [address]    Phone Number Patient can be reached at: Home number on file [phone] (home)    Best Time: any    Can we leave a detailed message on this number? YES    Call taken on 3/1/2017 at 10:48 AM by Ambreen Horta

## 2017-03-01 NOTE — TELEPHONE ENCOUNTER
Patient is requesting a prescription for medication to help him sleep. Please call back, okay to leave message.    Rajani Latif, Patient Representative

## 2017-03-02 ENCOUNTER — TELEPHONE (OUTPATIENT)
Dept: FAMILY MEDICINE | Facility: CLINIC | Age: 52
End: 2017-03-02

## 2017-03-02 DIAGNOSIS — I10 HYPERTENSION GOAL BP (BLOOD PRESSURE) < 140/90: ICD-10-CM

## 2017-03-02 DIAGNOSIS — I61.9 HEMORRHAGIC STROKE (H): ICD-10-CM

## 2017-03-02 DIAGNOSIS — R47.1 DYSARTHRIA: ICD-10-CM

## 2017-03-02 DIAGNOSIS — G81.91 RIGHT HEMIPARESIS (H): ICD-10-CM

## 2017-03-02 NOTE — TELEPHONE ENCOUNTER
Called and spoke to patient, relayed message (below). Patient verbalized understanding.  Will keep encounter open to follow up on Monday.     Chloe Azar RN   March 2, 2017 12:47 PM  [facility] Triage   [phone]

## 2017-03-02 NOTE — TELEPHONE ENCOUNTER
Reason for call:  Patient reporting a symptom    Symptom or request: B/P 93/79 this AM, patient thinking that his new medication is making his B/P too low, dizziness with standing    Duration (how long have symptoms been present): x 2 days    Have you been treated for this before? Yes    Additional comments: patient saw Joshua Cano PA-C on 2/28 and was given new medication    Phone Number patient can be reached at:  Home number on file [phone] (home)    Best Time:  any    Can we leave a detailed message on this number:  YES    Call taken on 3/2/2017 at 10:22 AM by Ambreen Horta

## 2017-03-02 NOTE — TELEPHONE ENCOUNTER
Will route to provider to advise.  See message below.  Would you like to see him to evaluate these symptoms or would you make any medication adjustments?     Chloe Azar RN   March 2, 2017 10:48 AM  [facility] Triage   [phone]

## 2017-03-02 NOTE — TELEPHONE ENCOUNTER
That is low. Recommend hold Lisinopril today and through weekend, monitor BP. Contact us Monday.  Thanks.  Edmond Cano, MPAS, PA-C

## 2017-03-03 ENCOUNTER — HOSPITAL ENCOUNTER (OUTPATIENT)
Dept: OCCUPATIONAL THERAPY | Facility: CLINIC | Age: 52
Setting detail: THERAPIES SERIES
End: 2017-03-03
Attending: PHYSICAL MEDICINE & REHABILITATION
Payer: COMMERCIAL

## 2017-03-03 ENCOUNTER — HOSPITAL ENCOUNTER (OUTPATIENT)
Dept: PHYSICAL THERAPY | Facility: CLINIC | Age: 52
Setting detail: THERAPIES SERIES
End: 2017-03-03
Attending: PHYSICAL MEDICINE & REHABILITATION
Payer: COMMERCIAL

## 2017-03-03 PROCEDURE — 97032 APPL MODALITY 1+ESTIM EA 15: CPT | Mod: GO | Performed by: OCCUPATIONAL THERAPIST

## 2017-03-03 PROCEDURE — 40000719 ZZHC STATISTIC PT DEPARTMENT NEURO VISIT: Performed by: PHYSICAL THERAPIST

## 2017-03-03 PROCEDURE — 97110 THERAPEUTIC EXERCISES: CPT | Mod: GP | Performed by: PHYSICAL THERAPIST

## 2017-03-03 PROCEDURE — 97116 GAIT TRAINING THERAPY: CPT | Mod: GP | Performed by: PHYSICAL THERAPIST

## 2017-03-03 PROCEDURE — 40000125 ZZHC STATISTIC OT OUTPT VISIT: Performed by: OCCUPATIONAL THERAPIST

## 2017-03-03 PROCEDURE — 97535 SELF CARE MNGMENT TRAINING: CPT | Mod: GO | Performed by: OCCUPATIONAL THERAPIST

## 2017-03-06 ENCOUNTER — PRE VISIT (OUTPATIENT)
Dept: NEUROLOGY | Facility: CLINIC | Age: 52
End: 2017-03-06

## 2017-03-06 RX ORDER — LISINOPRIL 10 MG/1
10 TABLET ORAL
Qty: 90 TABLET | Refills: 0 | COMMUNITY
Start: 2017-03-06 | End: 2017-07-11

## 2017-03-06 NOTE — TELEPHONE ENCOUNTER
1.  Date/reason for appt:  3/14/17   Stroke    2.  Referring provider:  [facility] d/c follow up    3.  Call to patient (Yes / No - short description):  No, referred.  Records reviewed.  All records are in Epic and imaging is in PACS.

## 2017-03-06 NOTE — TELEPHONE ENCOUNTER
Reviewed.  Seems he's fairly sensitive to it.  Have him do 1/2 tab daily and recheck BP as often as he can and recheck later this week - earlier if symptoms.  Thanks.  Joshua

## 2017-03-06 NOTE — TELEPHONE ENCOUNTER
Patient is calling back to speak with a nurse.  He received a call from Rloando Espitia  Patient Representative

## 2017-03-06 NOTE — TELEPHONE ENCOUNTER
Attempt # 1    Called patient at home number.     Was call answered?  No.  Left message on voicemail with information to call me back.    Manuel Torres RN

## 2017-03-06 NOTE — TELEPHONE ENCOUNTER
Called and spoke with patient. He stopped taking lisinopril and his blood pressure came up at night time. He then noticed his blood pressure was coming up at night time, so he started taking the pill at night. It was 131/90 last night, and then he took it. His blood pressure was 113/80 this morning. No more dizziness. How would you like to proceed?    Manuel Torres RN

## 2017-03-06 NOTE — TELEPHONE ENCOUNTER
Relayed message, he denied scheduling an appt at this time but he will monitor & call us back.  Yancy Terrazas RN

## 2017-03-07 ENCOUNTER — HOSPITAL ENCOUNTER (OUTPATIENT)
Dept: PHYSICAL THERAPY | Facility: CLINIC | Age: 52
Setting detail: THERAPIES SERIES
End: 2017-03-07
Attending: PHYSICAL MEDICINE & REHABILITATION
Payer: COMMERCIAL

## 2017-03-07 ENCOUNTER — HOSPITAL ENCOUNTER (OUTPATIENT)
Dept: OCCUPATIONAL THERAPY | Facility: CLINIC | Age: 52
Setting detail: THERAPIES SERIES
End: 2017-03-07
Attending: PHYSICAL MEDICINE & REHABILITATION
Payer: COMMERCIAL

## 2017-03-07 ENCOUNTER — TELEPHONE (OUTPATIENT)
Dept: FAMILY MEDICINE | Facility: CLINIC | Age: 52
End: 2017-03-07

## 2017-03-07 DIAGNOSIS — R25.2 SPASTICITY: ICD-10-CM

## 2017-03-07 PROCEDURE — 97110 THERAPEUTIC EXERCISES: CPT | Mod: GP | Performed by: PHYSICAL THERAPIST

## 2017-03-07 PROCEDURE — 97535 SELF CARE MNGMENT TRAINING: CPT | Mod: GO | Performed by: OCCUPATIONAL THERAPIST

## 2017-03-07 PROCEDURE — 97140 MANUAL THERAPY 1/> REGIONS: CPT | Mod: GO | Performed by: OCCUPATIONAL THERAPIST

## 2017-03-07 PROCEDURE — 97112 NEUROMUSCULAR REEDUCATION: CPT | Mod: GO | Performed by: OCCUPATIONAL THERAPIST

## 2017-03-07 PROCEDURE — 40000125 ZZHC STATISTIC OT OUTPT VISIT: Performed by: OCCUPATIONAL THERAPIST

## 2017-03-07 PROCEDURE — 97116 GAIT TRAINING THERAPY: CPT | Mod: GP | Performed by: PHYSICAL THERAPIST

## 2017-03-07 PROCEDURE — 40000719 ZZHC STATISTIC PT DEPARTMENT NEURO VISIT: Performed by: PHYSICAL THERAPIST

## 2017-03-07 PROCEDURE — 97110 THERAPEUTIC EXERCISES: CPT | Mod: GO | Performed by: OCCUPATIONAL THERAPIST

## 2017-03-07 RX ORDER — BACLOFEN 10 MG/1
5-10 TABLET ORAL 3 TIMES DAILY
Qty: 90 TABLET | Refills: 3 | Status: CANCELLED | OUTPATIENT
Start: 2017-03-07

## 2017-03-07 NOTE — TELEPHONE ENCOUNTER
Reason for Call:  Other prescription    Detailed comments: Wanting to know when the medication will be ready for pick-up. Please call him when the medication is ready. Thanks     Phone Number Patient can be reached at: Home number on file [phone] (home)    Best Time: Anytime     Can we leave a detailed message on this number? YES    Call taken on 3/7/2017 at 3:35 PM by Jolie Ames

## 2017-03-07 NOTE — DISCHARGE INSTRUCTIONS
3/7/17    PT exercises:    1.  Lying on L side - clamshells.    2.  Lying on stomach - toes over edge.  Flex/extend ankles    3.  Toe grab sock in sitting    4.  Yellow band ankle exercise    5.  Counter squats - equal weight bearing  Then heel raises    6.  Then weight shifts in staggered stance and calf stretch    7.  Laps around house with single or no cane with brace    8.  Weight shifts/ loading foot on the step with light rail use.    Rochelle  PT    Call insurance.

## 2017-03-08 NOTE — TELEPHONE ENCOUNTER
Called and spoke with [facility], they do not have this order even though it says receipt confirmed by pharmacy. This medication was not ordered from a provider in this clinic, so advised patient to contact prescribing provider to have it re-ordered. Patient verbalized understanding.    Manuel Torres RN

## 2017-03-08 NOTE — TELEPHONE ENCOUNTER
Patient calling to confirm update on status of refill.    Please call patient [phone].    Ariadna Horta,

## 2017-03-10 ENCOUNTER — HOSPITAL ENCOUNTER (OUTPATIENT)
Dept: OCCUPATIONAL THERAPY | Facility: CLINIC | Age: 52
Setting detail: THERAPIES SERIES
End: 2017-03-10
Attending: PHYSICAL MEDICINE & REHABILITATION
Payer: COMMERCIAL

## 2017-03-10 ENCOUNTER — HOSPITAL ENCOUNTER (OUTPATIENT)
Dept: PHYSICAL THERAPY | Facility: CLINIC | Age: 52
Setting detail: THERAPIES SERIES
End: 2017-03-10
Attending: PHYSICAL MEDICINE & REHABILITATION
Payer: COMMERCIAL

## 2017-03-10 DIAGNOSIS — I69.90 LATE EFFECTS OF CVA (CEREBROVASCULAR ACCIDENT): Primary | ICD-10-CM

## 2017-03-10 PROCEDURE — 40000719 ZZHC STATISTIC PT DEPARTMENT NEURO VISIT: Performed by: PHYSICAL THERAPIST

## 2017-03-10 PROCEDURE — 97116 GAIT TRAINING THERAPY: CPT | Mod: GP | Performed by: PHYSICAL THERAPIST

## 2017-03-10 PROCEDURE — 97032 APPL MODALITY 1+ESTIM EA 15: CPT | Mod: GO | Performed by: OCCUPATIONAL THERAPIST

## 2017-03-10 PROCEDURE — 97112 NEUROMUSCULAR REEDUCATION: CPT | Mod: GP | Performed by: PHYSICAL THERAPIST

## 2017-03-10 PROCEDURE — 40000125 ZZHC STATISTIC OT OUTPT VISIT: Performed by: OCCUPATIONAL THERAPIST

## 2017-03-10 PROCEDURE — 97112 NEUROMUSCULAR REEDUCATION: CPT | Mod: GO | Performed by: OCCUPATIONAL THERAPIST

## 2017-03-14 ENCOUNTER — OFFICE VISIT (OUTPATIENT)
Dept: NEUROLOGY | Facility: CLINIC | Age: 52
End: 2017-03-14

## 2017-03-14 VITALS — HEART RATE: 80 BPM | HEIGHT: 72 IN | SYSTOLIC BLOOD PRESSURE: 118 MMHG | DIASTOLIC BLOOD PRESSURE: 72 MMHG

## 2017-03-14 DIAGNOSIS — G47.33 OBSTRUCTIVE SLEEP APNEA SYNDROME: ICD-10-CM

## 2017-03-14 DIAGNOSIS — I61.8 OTHER LEFT-SIDED NONTRAUMATIC INTRACEREBRAL HEMORRHAGE (H): Primary | ICD-10-CM

## 2017-03-14 ASSESSMENT — ENCOUNTER SYMPTOMS
SINUS PAIN: 0
TROUBLE SWALLOWING: 1
SORE THROAT: 0
HOARSE VOICE: 1
TASTE DISTURBANCE: 1
NECK MASS: 0
SMELL DISTURBANCE: 0
SINUS CONGESTION: 0

## 2017-03-14 ASSESSMENT — PAIN SCALES - GENERAL: PAINLEVEL: NO PAIN (0)

## 2017-03-14 NOTE — PATIENT INSTRUCTIONS
1. Check BP in morning and note down in a diary. Goal BP <130; preferably in 120's.   2. MRI Brain with MR angiogram for follow-up  3. Return to clinic in 3 months

## 2017-03-14 NOTE — PROGRESS NOTES
[facility]  [address] [address]  Phone: [phone]  Fax: [phone]    Reason for clinic visit:  Hx of Lt basal ganglia ICH    History of present Illness:   Mr. Mendiola is a 51 yo M w hx of HTN presented at [facility] in 12/16 with acute onset Rt sided weakness and aphasia while he was on vacation on [facility]. CTH on arrival to ER revealed Lt basal ganglia ICH with 2 smaller foci of hemorrhage in the same area. His CTA revealed mild mid-basilar stenosis and no evidence of vascular malformation. His BP was on admission was in 180's. He was admitted to hospital and ICH was attributed to hypertension. At the time of admission; patient had severe expressive aphasia, Rt sided plegia and complete sensory loss on Rt. He was subsequently transferred to [facility] rehab for acute rehabilitation. He had significant improvement in his speech, strength and sensation. He presented today in stroke clinic to establish care for his recent ICH. He continues to get therapies about twice a week. Had problems of depression and was started on Prozac which is working well. He is currently in good spirits. Used to play guitar as hobby but couldn't do it any more. Denies any new problems.    ROS:  Answers for HPI/ROS submitted by the patient on 3/14/2017   General Symptoms: No  Skin Symptoms: No  HENT Symptoms: Yes  EYE SYMPTOMS: No  HEART SYMPTOMS: No  LUNG SYMPTOMS: No  INTESTINAL SYMPTOMS: No  URINARY SYMPTOMS: No  REPRODUCTIVE SYMPTOMS: No  SKELETAL SYMPTOMS: No  BLOOD SYMPTOMS: No  NERVOUS SYSTEM SYMPTOMS: No  MENTAL HEALTH SYMPTOMS: No  Ear pain: No  Ear discharge: No  Hearing loss: No  Tinnitus: No  Nosebleeds: No  Congestion: No  Sinus pain: No  Trouble swallowing: Yes   Voice hoarseness: Yes  Mouth sores: No  Sore throat: No  Tooth pain: No  Gum tenderness: No  Bleeding gums: No  Change in taste: Yes  Change in sense of  smell: No  Dry mouth: No  Hearing aid used: No  Neck lump: No    Past Medical History:  Past Medical History   Diagnosis Date     Anxiety      Past Surgical History:  No past surgical history on file.    Social History:  Social History     Social History     Marital status:      Spouse name: N/A     Number of children: N/A     Years of education: N/A     Occupational History     Not on file.     Social History Main Topics     Smoking status: Never Smoker     Smokeless tobacco: Never Used     Alcohol use Yes      Comment: 6 pack per week     Drug use: No     Sexual activity: Yes     Partners: Female     Birth control/ protection: Surgical      Comment: vasectomy     Other Topics Concern     Parent/Sibling W/ Cabg, Mi Or Angioplasty Before 65f 55m? No     Social History Narrative     Smoking: Never  Alcohol: Only on weekends; 6-12 packs beer  Illicit dru years ago only once or twice. Nothing for past 25 years    Family History:  No family hx of stroke, ICH or coagulation disorder    Home Medications:  Current Outpatient Prescriptions   Medication     lisinopril (PRINIVIL/ZESTRIL) 10 MG tablet     traZODone (DESYREL) 50 MG tablet     FLUoxetine (PROZAC) 20 MG capsule     hydrOXYzine (ATARAX) 25 MG tablet     propranolol (INDERAL) 20 MG tablet     diphenhydrAMINE (BENADRYL) 25 MG capsule     baclofen (LIORESAL) 10 MG tablet     No current facility-administered medications for this visit.      Allergies:  No Known Allergies    Physical Examination:  /72  Pulse 80  Ht 1.829 m (6')  Neurological:  Awake, alert and oriented x3  Cranial Nerves: PERRLA, VFF, EOMI, Facial sensations diminished on Rt, Mild Rt facial droop,hearing normal B/L, palate elevates to midline, shoulder shrug strong weak on Rt, tongue movements intact   Motor: Strength 5/5 on Lt. RUE: Shoulder abduction & adduction 3/5; elbow flexion 2/5; elbow extension 3/5; wrist flexion & extension 2/5; finger flexion/extebnsion 1/5. RLE: Hip  flexion & extension 4/5; knee flexion & extension 5-/4; ankle flexion & extension 4/5.   Sensations: Moderately diminished on Rt as compared to Lt in face, arm & leg  Cerebellar signs: FTN/HST intact on Lt. Cannot test FTN on Rt. Intact HST on Lt.   Gait: Hemiparetic gait. Normal. Romberg's neg. Tandem gait normal  Attention/Concentration: Intact per serial 7's  Memory: Immediate & delayed recall 3/3    National Institutes of Health Stroke Scale     Score    Level of consciousness: (0)   Alert, keenly responsive    LOC questions: (0)   Answers both questions correctly    LOC commands: (0)   Performs both tasks correctly    Best gaze: (0)   Normal    Visual: (0)   No visual loss    Facial palsy: (1)   Minor paralysis (flat nasolabial fold, smile asymmetry)    Motor arm (left): (0)   No drift    Motor arm (right): (3)   No effort against gravity    Motor leg (left): (0)   No drift    Motor leg (right): (1)   Drift    Limb ataxia: (0)   Absent    Sensory: (1)   Mild to moderate sensory loss    Best language: (1)   Mild to moderate aphasia    Dysarthria: (0)   Normal    Extinction and inattention: (0)   No abnormality        Total Score:  7     mRS 3 - Moderate disability. Requires some help, but able to walk unassisted.    Laboratory findings:  Reviewed    Impression:  1. Lt BG ICH  2. Hypertension    Plan:  1. MRI brain w/wo contrast and MRA head/neck for f/u and to r/o vascular malformation  2. Counseled regarding importance of BP control. Goal SBP <130. Cont Lisinopril  3. Suspected sleep apnea - Referral placed for outpatient sleep study. Better management of suspected FAROOQ is necessary to avoid long term risk worsening hypertension  4. Discussed the options of transcranial magnetic stimulation (TMS) along with being a part of trial transcranial direct current stimulation (tDCS) or computer brain interface (CBI).   5. RTC in 3 months; ok to overbook    Patient seen and discussed with Dr. Hidalgo who agrees with plan  mentioned above       VASCULAR NEUROLOGY ATTENDING ATTESTATION    I saw the patient March 14, 2017 with the stroke fellow.  I reviewed all laboratory studies and neuroimaging.  I discussed the plan with the fellow and agree with their note.    In summary, this is a 52 year old male who presented December 2016 with left BG ICH likely secondary to HTN.  CTA was unrevealing.      1. Will obtain and MRI/MRA for follow-up to rule out a vascular malformation and underlying mass.    2. Outpatient sleep study  3. BP goal <130/85  4. Consider rTMS for recovery in a few months  5. RTC in 3 months    Franklin Hidalgo  Vascular Neurology    Franklin Hidalgo MD  Vascular Neurology

## 2017-03-14 NOTE — MR AVS SNAPSHOT
After Visit Summary   3/14/2017    Maldonado Mendiola    MRN: [id number]           Patient Information     Date Of Birth          1965        Visit Information        Provider Department      3/14/2017 9:00 AM Franklin Hidalgo MD [facility] Neu[facility]        Today's Diagnoses     Other left-sided nontraumatic intracerebral hemorrhage (H)    -  1      Care Instructions    1. Check BP in morning and note down in a diary. Goal BP <130; preferably in 120's.   2. MRI Brain with MR angiogram for follow-up  3. Return to clinic in 3 months          Follow-ups after your visit        Follow-up notes from your care team     Return in about 3 months (around 6/14/2017).      Your next 10 appointments already scheduled     Mar 15, 2017  8:40 AM CDT   (Arrive by 8:25 AM)   New Patient Visit with Miguel Beauchamp MD   [facility] Physical Medicine and Rehabilitation ([facility])    [address] [place] [id number]   [phone]            Mar 17, 2017  8:00 AM CDT   Treatment 60 with Raghav Matute, OT   [facility] ([facility])    [address], [address]  [place] [address]   [phone]            Mar 17, 2017  9:00 AM CDT   Treatment 60 with Rochelle Capone, PT   [facility] ([facility])    [address], [address]  [place] [address]   [phone]            Mar 20, 2017  9:30 AM CDT   Treatment 60 with Rochelle Capone PT   [facility] ([facility])    [address]  [place] [address]   [phone]            Mar 20, 2017 10:30 AM CDT   Treatment 60 with Laura Johns, OT   [facility] ([facility])    [address]  [place] [address]   [phone]            Mar 24, 2017  9:00 AM CDT   Treatment 45 with Rochelle Capone, PT   [facility] [place], Physical Therapy - Outpatient ([facility])    [address]  [place] [address]   [phone]            Mar 24, 2017  9:45 AM CDT   Treatment 60 with Laura Johns, OT   [facility] [place], Occupational Therapy - Outpatient ([facility])    [address]  [place] [address]   [phone]            Mar 27, 2017  9:30 AM CDT   Treatment 60 with Laura Johns, OT   [facility] ([facility])    [address]  [place] [address]   [phone]            Mar 27, 2017 10:30 AM CDT   Treatment 45 with Jessie Skaggs, SLP   [facility], [place], Speech Therapy - Oupatient ([facility])    [address]  [place] [address]   [phone]            Mar 31, 2017  8:30 AM CDT   Treatment 60 with Raghav Matute, OT   [facility] [place], Occupational Therapy - Outpatient ([facility])    [address]  [place] [address]   [phone]              Future tests that were ordered for you today     Open Future Orders        Priority Expected Expires Ordered    MRA Angiogram Neck w Contrast Routine  3/14/2018 3/14/2017    MR Brain w/o & w Contrast Routine  3/14/2018 3/14/2017    MRA Head w/o Contrast Angiogram Routine  3/14/2018 3/14/2017            Who to contact     Please call your clinic at [phone] to:    Ask questions about your health    Make or cancel appointments    Discuss your medicines    Learn about your test results    Speak to your doctor   If you have compliments or concerns about an experience at your clinic, or if you wish  to file a complaint, please contact [facility] Patient Relations at [phone] or email us at [email or web address]         Additional Information About Your Visit        [employer] Information     Muufri is an electronic gateway that provides easy, online access to your medical records. With Muufri, you can request a clinic appointment, read your test results, renew a prescription or communicate with your care team.     To sign up for Muufri visit the website at [email or web address]   You will be asked to enter the access code listed below, as well as some personal information. Please follow the directions to create your username and password.     Your access code is: [email or web address]  Expires: 2017  1:15 PM     Your access code will  in 90 days. If you need help or a new code, please contact your [facility] Clinic or call [phone] for assistance.        Care EveryWhere ID     This is your Care EveryWhere ID. This could be used by other organizations to access your [place] medical records  [id number]        Your Vitals Were     Pulse Height                80 1.829 m (6')           Blood Pressure from Last 3 Encounters:   17 118/72   17 110/68   17 120/70    Weight from Last 3 Encounters:   17 76.7 kg (169 lb)   17 74.5 kg (164 lb 4 oz)   17 71.8 kg (158 lb 3.2 oz)               Primary Care Provider Office Phone # Fax #    Edmond Cano PA-C [phone]       [address] [address]        Thank you!     Thank you for choosing [facility]  for your care. Our goal is always to provide you with excellent care. Hearing back from our patients is one way we can continue to improve our services. Please take a few minutes to complete the written survey that you may receive in the mail after your visit with us. Thank you!              Your Updated Medication List - Protect others around you: Learn how to safely use, store and throw away your medicines at www.disposemymeds.org.          This list is accurate as of: 3/14/17 10:39 AM.  Always use your most recent med list.                   Brand Name Dispense Instructions for use    baclofen 10 MG tablet    LIORESAL    90 tablet    Take 0.5-1 tablets (5-10 mg) by mouth 3 times daily       diphenhydrAMINE 25 MG capsule    BENADRYL     Take 1-2 capsules (25-50 mg) by mouth nightly as needed for sleep       FLUoxetine 20 MG capsule    PROzac    90 capsule    Take 1 capsule (20 mg) by mouth daily       hydrOXYzine 25 MG tablet    ATARAX    60 tablet    1 to 2 tablets every 6 hours as needed for panic/anxiety       lisinopril 10 MG tablet    PRINIVIL/ZESTRIL    90 tablet    1/2 tab daily       propranolol 20 MG tablet    INDERAL    90 tablet    Take 1 tablet (20 mg) by mouth as needed Daily for panic / anxiety       traZODone 50 MG tablet    DESYREL    30 tablet    1 to 3 tablets, 30 minutes before bedtime

## 2017-03-14 NOTE — LETTER
3/14/2017       RE: Maldonado Mendiola  [address]  [place] [address]     Dear Colleague,    Thank you for referring your patient, Maldonado Mendiola, to the [facility] at [facility]. Please see a copy of my visit note below.                                                            [facility]  [address]  3rd [address]  [place] [address]  Phone: [phone]  Fax: [phone]    Reason for clinic visit:  Hx of Lt basal ganglia ICH    History of present Illness:   Mr. Mendiola is a 53 yo M w hx of HTN presented at [facility] in 12/16 with acute onset Rt sided weakness and aphasia while he was on vacation on [facility]. CTH on arrival to ER revealed Lt basal ganglia ICH with 2 smaller foci of hemorrhage in the same area. His CTA revealed mild mid-basilar stenosis and no evidence of vascular malformation. His BP was on admission was in 180's. He was admitted to hospital and ICH was attributed to hypertension. At the time of admission; patient had severe expressive aphasia, Rt sided plegia and complete sensory loss on Rt. He was subsequently transferred to [facility] rehab for acute rehabilitation. He had significant improvement in his speech, strength and sensation. He presented today in stroke clinic to establish care for his recent ICH. He continues to get therapies about twice a week. Had problems of depression and was started on Prozac which is working well. He is currently in good spirits. Used to play guitar as hobby but couldn't do it any more. Denies any new problems.    Past Medical History:  Past Medical History   Diagnosis Date     Anxiety      Past Surgical History:  No past surgical history on file.    Social History:  Social History     Social History     Marital status:      Spouse name: N/A     Number of children: N/A     Years of education: N/A     Occupational History     Not on file.     Social History Main Topics     Smoking status:  Never Smoker     Smokeless tobacco: Never Used     Alcohol use Yes      Comment: 6 pack per week     Drug use: No     Sexual activity: Yes     Partners: Female     Birth control/ protection: Surgical      Comment: vasectomy     Other Topics Concern     Parent/Sibling W/ Cabg, Mi Or Angioplasty Before 65f 55m? No     Social History Narrative     Smoking: Never  Alcohol: Only on weekends; 6-12 packs beer  Illicit dru years ago only once or twice. Nothing for past 25 years    Family History:  No family hx of stroke, ICH or coagulation disorder    Home Medications:  Current Outpatient Prescriptions   Medication     lisinopril (PRINIVIL/ZESTRIL) 10 MG tablet     traZODone (DESYREL) 50 MG tablet     FLUoxetine (PROZAC) 20 MG capsule     hydrOXYzine (ATARAX) 25 MG tablet     propranolol (INDERAL) 20 MG tablet     diphenhydrAMINE (BENADRYL) 25 MG capsule     baclofen (LIORESAL) 10 MG tablet     No current facility-administered medications for this visit.      Allergies:  No Known Allergies    Physical Examination:  /72  Pulse 80  Ht 1.829 m (6')  Neurological:  Awake, alert and oriented x3  Cranial Nerves: PERRLA, VFF, EOMI, Facial sensations diminished on Rt, Mild Rt facial droop,hearing normal B/L, palate elevates to midline, shoulder shrug strong weak on Rt, tongue movements intact   Motor: Strength 5/5 on Lt. RUE: Shoulder abduction & adduction 3/5; elbow flexion 2/5; elbow extension 3/5; wrist flexion & extension 2/5; finger flexion/extebnsion 1/5. RLE: Hip flexion & extension 4/5; knee flexion & extension 5-/4; ankle flexion & extension 4/5.   Sensations: Moderately diminished on Rt as compared to Lt in face, arm & leg  Cerebellar signs: FTN/HST intact on Lt. Cannot test FTN on Rt. Intact HST on Lt.   Gait: Hemiparetic gait. Normal. Romberg's neg. Tandem gait normal  Attention/Concentration: Intact per serial 7's  Memory: Immediate & delayed recall 3/3    National Institutes of Health Stroke Scale     Score     Level of consciousness: (0)   Alert, keenly responsive    LOC questions: (0)   Answers both questions correctly    LOC commands: (0)   Performs both tasks correctly    Best gaze: (0)   Normal    Visual: (0)   No visual loss    Facial palsy: (1)   Minor paralysis (flat nasolabial fold, smile asymmetry)    Motor arm (left): (0)   No drift    Motor arm (right): (3)   No effort against gravity    Motor leg (left): (0)   No drift    Motor leg (right): (1)   Drift    Limb ataxia: (0)   Absent    Sensory: (1)   Mild to moderate sensory loss    Best language: (1)   Mild to moderate aphasia    Dysarthria: (0)   Normal    Extinction and inattention: (0)   No abnormality        Total Score:  7     mRS 3 - Moderate disability. Requires some help, but able to walk unassisted.    Laboratory findings:  Reviewed    Impression:  1. Lt BG ICH  2. Hypertension    Plan:  1. MRI brain w/wo contrast and MRA head/neck for f/u and to r/o vascular malformation  2. Counseled regarding importance of BP control. Goal SBP <130. Cont Lisinopril  3. Suspected sleep apnea - Referral placed for outpatient sleep study. Better management of suspected FAROOQ is necessary to avoid long term risk worsening hypertension  4. Discussed the options of transcranial magnetic stimulation (TMS) along with being a part of trial transcranial direct current stimulation (tDCS) or computer brain interface (CBI).   5. RTC in 3 months; ok to overbook    Patient seen and discussed with Dr. Hidalgo who agrees with plan mentioned above       VASCULAR NEUROLOGY ATTENDING ATTESTATION    I saw the patient March 14, 2017 with the stroke fellow.  I reviewed all laboratory studies and neuroimaging.  I discussed the plan with the fellow and agree with their note.    In summary, this is a 52 year old male who presented December 2016 with left BG ICH likely secondary to HTN.  CTA was unrevealing.      1. Will obtain and MRI/MRA for follow-up to rule out a vascular malformation and  underlying mass.    2. Outpatient sleep study  3. BP goal <130/85  4. Consider rTMS for recovery in a few months  5. RTC in 3 months    Franklin Hidalgo  Vascular Neurology

## 2017-03-15 ENCOUNTER — OFFICE VISIT (OUTPATIENT)
Dept: PHYSICAL MEDICINE AND REHAB | Facility: CLINIC | Age: 52
End: 2017-03-15

## 2017-03-15 VITALS
SYSTOLIC BLOOD PRESSURE: 107 MMHG | HEIGHT: 72 IN | BODY MASS INDEX: 22.17 KG/M2 | HEART RATE: 85 BPM | WEIGHT: 163.7 LBS | DIASTOLIC BLOOD PRESSURE: 73 MMHG

## 2017-03-15 DIAGNOSIS — G81.91 RIGHT HEMIPARESIS (H): Primary | ICD-10-CM

## 2017-03-15 ASSESSMENT — PAIN SCALES - GENERAL: PAINLEVEL: NO PAIN (0)

## 2017-03-15 NOTE — MR AVS SNAPSHOT
After Visit Summary   3/15/2017    Maldonado Mendiola    MRN: [id number]           Patient Information     Date Of Birth          1965        Visit Information        Provider Department      3/15/2017 8:40 AM Miguel Beauchamp MD [facility] Physical Medicine and Rehabilitation         Follow-ups after your visit        Follow-up notes from your care team     Return in about 3 months (around 6/15/2017).      Your next 10 appointments already scheduled     Mar 17, 2017  8:00 AM CDT   Treatment 60 with Raghav [facility]ute, OT   [facility])    [address], [address]  [place] [address]   [phone]            Mar 17, 2017  9:00 AM CDT   Treatment 60 with Rochelle Capone, PT   [facility] ([facility])    [address], [address]  [place] [address]   [phone]            Mar 20, 2017  9:30 AM CDT   Treatment 60 with Rochelle Capone, PT   [facility] ([facility])    [address], [address]  [place] [address]   [phone]            Mar 20, 2017 10:30 AM CDT   Treatment 60 with Laura Johns, OT   [facility] ([facility])    [address], [address]  [place] [address]   [phone]            Mar 24, 2017  9:00 AM CDT   Treatment 45 with Rochelle Capone, PT   [facility] ([facility])    [address], [address]  [place] [address]   [phone]            Mar 24, 2017  9:45 AM CDT   Treatment 60 with Laura Johns, OT   [facility] ([facility])    [address]  [place] [address]   [phone]            Mar 27, 2017  9:30 AM CDT   Treatment 60 with Laura Johns, OT   [facility] [place], Occupational Therapy - Outpatient ([facility])    [address]  [place] [address]   [phone]            Mar 27, 2017 10:30 AM CDT   Treatment 45 with Jessie Skaggs, SLP   [facility] [place], Speech Therapy - Oupatient ([facility])    [address]  [place] [address]   [phone]            Mar 31, 2017  8:30 AM CDT   Treatment 60 with Raghav Matute, OT   [facility] [place], Occupational Therapy - Outpatient ([facility])    [address]  [place] [address]   [phone]            Mar 31, 2017  9:30 AM CDT   Treatment 60 with Rochelle Capone, PT   [facility] [place], Physical Therapy - Outpatient ([facility])    [address]  [place] [address]   [phone]              Future tests that were ordered for you today     Open Future Orders        Priority Expected Expires Ordered    MRA Angiogram Neck w Contrast Routine  3/14/2018 3/14/2017    MR Brain w/o & w Contrast Routine  3/14/2018 3/14/2017    MRA Head w/o Contrast Angiogram Routine  3/14/2018 3/14/2017            Who to contact     Please call your clinic at [phone] to:    Ask questions about your health    Make or cancel appointments    Discuss your medicines    Learn about your test results    Speak to your doctor   If you have compliments or concerns about an experience at your clinic, or if you wish to file a complaint, please contact [facility] Patient Relations at [phone] or email us at [email or web address]         Additional Information About Your Visit        MyChart Information     Nubli is an electronic gateway that  provides easy, online access to your medical records. With Translimit, you can request a clinic appointment, read your test results, renew a prescription or communicate with your care team.     To sign up for Translimit visit the website at [email or web address]   You will be asked to enter the access code listed below, as well as some personal information. Please follow the directions to create your username and password.     Your access code is: [email or web address]  Expires: 2017  1:15 PM     Your access code will  in 90 days. If you need help or a new code, please contact your [facility] or call [phone] for assistance.        Care EveryWhere ID     This is your Care EveryWhere ID. This could be used by other organizations to access your [place] medical records  [id number]        Your Vitals Were     Pulse Height BMI (Body Mass Index)             85 1.829 m (6') 22.2 kg/m2          Blood Pressure from Last 3 Encounters:   03/15/17 107/73   17 118/72   17 110/68    Weight from Last 3 Encounters:   03/15/17 74.3 kg (163 lb 11.2 oz)   17 76.7 kg (169 lb)   17 74.5 kg (164 lb 4 oz)              Today, you had the following     No orders found for display       Primary Care Provider Office Phone # Fax #    Edmond Cano PA-C [phone]       [address] [address]        Thank you!     Thank you for choosing [facility] PHYSICAL MEDICINE AND REHABILITATION  for your care. Our goal is always to provide you with excellent care. Hearing back from our patients is one way we can continue to improve our services. Please take a few minutes to complete the written survey that you may receive in the mail after your visit with us. Thank you!             Your Updated Medication List - Protect others around you: Learn how to safely use, store and throw away your medicines at www.disposemymeds.org.           This list is accurate as of: 3/15/17  9:38 AM.  Always use your most recent med list.                   Brand Name Dispense Instructions for use    baclofen 10 MG tablet    LIORESAL    90 tablet    Take 0.5-1 tablets (5-10 mg) by mouth 3 times daily       diphenhydrAMINE 25 MG capsule    BENADRYL     Take 1-2 capsules (25-50 mg) by mouth nightly as needed for sleep       FLUoxetine 20 MG capsule    PROzac    90 capsule    Take 1 capsule (20 mg) by mouth daily       hydrOXYzine 25 MG tablet    ATARAX    60 tablet    1 to 2 tablets every 6 hours as needed for panic/anxiety       lisinopril 10 MG tablet    PRINIVIL/ZESTRIL    90 tablet    1/2 tab daily       propranolol 20 MG tablet    INDERAL    90 tablet    Take 1 tablet (20 mg) by mouth as needed Daily for panic / anxiety       traZODone 50 MG tablet    DESYREL    30 tablet    1 to 3 tablets, 30 minutes before bedtime

## 2017-03-15 NOTE — LETTER
3/15/2017       RE: Maldonado Mendiola  [address] [place] [place] [facility] [place] [address]     Dear Colleague,    Thank you for referring your patient, Maldonado Mendiola, to the [facility] PHYSICAL MEDICINE AND REHABILITATION at [facility]. Please see a copy of my visit note below.    HISTORY OF PRESENT ILLNESS:  Mac is a 52-year-old gentleman who I followed the [facility] following a left basal ganglia hemorrhagic stroke.  Since discharge, he has been participating in outpatient therapies.  Overall, the quickest to improve has been his walking and he is using no assistive device in the home, but a single-end cane in the community.  He does have a bit shorter stride length.  He is able to go a few city blocks before fatiguing.  He had one fall that was somewhat random but otherwise has been remaining safe.  Slower to improve is his right upper extremity which continues to be very weak.  He has been working with outpatient occupational therapy and does have currently neuromuscular electrical stimulation on a 3 month trial.  This does help with some elicitable movements.  He does have muscle disuse atrophy in his right arm.  Mac is not really able to incorporate the right hand into functional tasks yet.  He is doing better with various compensatory strategies.  Mac has some swelling mostly in the right hand but improves partially with relative elevation and some self-directed massage.      Mac is also interested in some aquatic therapy; however, one of the challenges is his insurance has a maximum number of total visits with different therapies and they are really wanting to utilize as much of this as possible through the land-based.  Referral was sent recently for 1-2 visits to establish a maintenance program through [facility] in aquatic pool program.  After this, he will do more self-directed exercises.      PHYSICAL EXAMINATION:  Blood pressure  107/73.  Heart rate 85.  Weight 163 pounds.  Height 6 feet.  Body mass index 22.  In general, Mac is fully alert, pleasant.  He is an excellent historian with fluent speech and language.  His significant other, Shannan, accompanies him today.  She is supportive and augments some history and denies any incapacity on Mac's cognitive abilities.  He does arrive bringing with a single-end cane which he holds in the left hand.  He does walk with a reciprocal gait pattern, with the reduced swing on the right but stable.  He mostly keeps the knee extended through stance phase.  No buckling seen.  Right arm has significant paresis.  He is able to abduct the shoulder actively about 45 degrees and flex his elbow actively to about 90 degrees against gravity.  There is trace movements at the fingers only.  There is some mild swelling through the hand.  No erythema or joint tenderness.      ASSESSMENT AND RECOMMENDATIONS:   1. Mac is about 3 months out from hemorrhagic stroke with some residual right-sided paresis more severe in the arm.  He has subsequent impaired activities of daily living and mobility.   2. Longer conversation today about overall neuroplasticity and recovery timeframes.  Given he has this much weakness 3 months into his recovery, he is anticipated to have some long-term weakness and dysfunction in the right arm.  That said, there is definitely some room for incremental improvements and possible incorporation into some different tasks.  For example, he tries to use the right to turn on and off light switches.   3. Reviewed the difference between some of the neurologic impairments that some of the additional secondary disuse.  Because is difficult to incorporate into tasks, it tends to be left alone, which in turn exacerbates the deficits.  I do not believe there is enough residual function to benefit from any constraint-induced type therapies.   4. I do believe it is appropriate to continue use with the  neuromuscular electrical stimulation to address some of the muscle disuse atrophy.  Currently using this more for the wrist and hand muscles, though consideration for use more proximally in the shoulder.   5. There is some inferior subluxation because of the weakness in the shoulder rotator cuff and surrounding muscles.  He does use different slings.  There is a GivMohr sling but he finds the dependent edema is worse when using that compared to more traditional sling with the elbow bent.   6. Education on avoiding contractures at the elbow and shoulder, which can occur if over utilizing immobilizing slings.   7. For swelling, elevation and massage remain good options as is some of the compression glove which he has.  It is difficult to don this but able to get it done.   8. He is continuing with some outpatient occupational therapy, in particular and some with land based physical therapy.  As above, we will have a short course of pool therapy to get him started on a more maintenance program.   9. Conversations on adjustment to disability and focus on quality of life aspects and different accommodations.  While I am not suggesting to give up on some further right hand function, finding other adaptive modes are important.  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  Briefly in clinic, we did a web search for 1 handed guitarists and he was quite impressed.  Exploring those kind of options will be important for him.   10. For mood, he does continue with fluoxetine.   11. For stroke risk reduction, primary factors controlling blood pressure and he is on lisinopril.  Defer that to his primary provider, Dr. Edmond Sevilla, for dose adjustments as needed.   12. I would like to follow up with Mac in Rehabilitation Clinic in about 2 months' time.  He will contact us sooner if functional rehab issues arise.      Forty-five minutes spent in direct patient interaction, greater than 50%  counseling and education of the above-detailed items.      Miguel Beauchamp MD   D: 2017 11:35   T: 2017 13:25   MT: amy      Name:     PETEY DE LEON   MRN:      [id number]        Account:      [id number]   :      1965           Service Date: 03/15/2017      Document: [id number]

## 2017-03-17 ENCOUNTER — HOSPITAL ENCOUNTER (OUTPATIENT)
Dept: OCCUPATIONAL THERAPY | Facility: CLINIC | Age: 52
Setting detail: THERAPIES SERIES
End: 2017-03-17
Attending: PHYSICAL MEDICINE & REHABILITATION
Payer: COMMERCIAL

## 2017-03-17 ENCOUNTER — HOSPITAL ENCOUNTER (OUTPATIENT)
Dept: PHYSICAL THERAPY | Facility: CLINIC | Age: 52
Setting detail: THERAPIES SERIES
End: 2017-03-17
Attending: PHYSICAL MEDICINE & REHABILITATION
Payer: COMMERCIAL

## 2017-03-17 PROCEDURE — 40000125 ZZHC STATISTIC OT OUTPT VISIT: Performed by: OCCUPATIONAL THERAPIST

## 2017-03-17 PROCEDURE — 97112 NEUROMUSCULAR REEDUCATION: CPT | Mod: GO | Performed by: OCCUPATIONAL THERAPIST

## 2017-03-17 PROCEDURE — 97116 GAIT TRAINING THERAPY: CPT | Mod: GP | Performed by: PHYSICAL THERAPIST

## 2017-03-17 PROCEDURE — 97110 THERAPEUTIC EXERCISES: CPT | Mod: GO | Performed by: OCCUPATIONAL THERAPIST

## 2017-03-17 PROCEDURE — 97112 NEUROMUSCULAR REEDUCATION: CPT | Mod: GP | Performed by: PHYSICAL THERAPIST

## 2017-03-17 PROCEDURE — 97032 APPL MODALITY 1+ESTIM EA 15: CPT | Mod: GO | Performed by: OCCUPATIONAL THERAPIST

## 2017-03-17 PROCEDURE — 40000719 ZZHC STATISTIC PT DEPARTMENT NEURO VISIT: Performed by: PHYSICAL THERAPIST

## 2017-03-17 PROCEDURE — 97140 MANUAL THERAPY 1/> REGIONS: CPT | Mod: GO | Performed by: OCCUPATIONAL THERAPIST

## 2017-03-20 NOTE — PROGRESS NOTES
HISTORY OF PRESENT ILLNESS:  Mac is a 52-year-old gentleman who I followed the [facility] following a left basal ganglia hemorrhagic stroke.  Since discharge, he has been participating in outpatient therapies.  Overall, the quickest to improve has been his walking and he is using no assistive device in the home, but a single-end cane in the community.  He does have a bit shorter stride length.  He is able to go a few city blocks before fatiguing.  He had one fall that was somewhat random but otherwise has been remaining safe.  Slower to improve is his right upper extremity which continues to be very weak.  He has been working with outpatient occupational therapy and does have currently neuromuscular electrical stimulation on a 3 month trial.  This does help with some elicitable movements.  He does have muscle disuse atrophy in his right arm.  Mac is not really able to incorporate the right hand into functional tasks yet.  He is doing better with various compensatory strategies.  Mac has some swelling mostly in the right hand but improves partially with relative elevation and some self-directed massage.      Mac is also interested in some aquatic therapy; however, one of the challenges is his insurance has a maximum number of total visits with different therapies and they are really wanting to utilize as much of this as possible through the land-based.  Referral was sent recently for 1-2 visits to establish a maintenance program through [facility] in aquatic pool program.  After this, he will do more self-directed exercises.      PHYSICAL EXAMINATION:  Blood pressure 107/73.  Heart rate 85.  Weight 163 pounds.  Height 6 feet.  Body mass index 22.  In general, Mac is fully alert, pleasant.  He is an excellent historian with fluent speech and language.  His significant other, Shannan, accompanies him today.  She is supportive and augments some history and denies any  incapacity on Mac's cognitive abilities.  He does arrive bringing with a single-end cane which he holds in the left hand.  He does walk with a reciprocal gait pattern, with the reduced swing on the right but stable.  He mostly keeps the knee extended through stance phase.  No buckling seen.  Right arm has significant paresis.  He is able to abduct the shoulder actively about 45 degrees and flex his elbow actively to about 90 degrees against gravity.  There is trace movements at the fingers only.  There is some mild swelling through the hand.  No erythema or joint tenderness.      ASSESSMENT AND RECOMMENDATIONS:   1. Mac is about 3 months out from hemorrhagic stroke with some residual right-sided paresis more severe in the arm.  He has subsequent impaired activities of daily living and mobility.   2. Longer conversation today about overall neuroplasticity and recovery timeframes.  Given he has this much weakness 3 months into his recovery, he is anticipated to have some long-term weakness and dysfunction in the right arm.  That said, there is definitely some room for incremental improvements and possible incorporation into some different tasks.  For example, he tries to use the right to turn on and off light switches.   3. Reviewed the difference between some of the neurologic impairments that some of the additional secondary disuse.  Because is difficult to incorporate into tasks, it tends to be left alone, which in turn exacerbates the deficits.  I do not believe there is enough residual function to benefit from any constraint-induced type therapies.   4. I do believe it is appropriate to continue use with the neuromuscular electrical stimulation to address some of the muscle disuse atrophy.  Currently using this more for the wrist and hand muscles, though consideration for use more proximally in the shoulder.   5. There is some inferior subluxation because of the weakness in the shoulder rotator cuff and  surrounding muscles.  He does use different slings.  There is a GivMohr sling but he finds the dependent edema is worse when using that compared to more traditional sling with the elbow bent.   6. Education on avoiding contractures at the elbow and shoulder, which can occur if over utilizing immobilizing slings.   7. For swelling, elevation and massage remain good options as is some of the compression glove which he has.  It is difficult to don this but able to get it done.   8. He is continuing with some outpatient occupational therapy, in particular and some with land based physical therapy.  As above, we will have a short course of pool therapy to get him started on a more maintenance program.   9. Conversations on adjustment to disability and focus on quality of life aspects and different accommodations.  While I am not suggesting to give up on some further right hand function, finding other adaptive modes are important.  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  Briefly in clinic, we did a web search for 1 handed guitarists and he was quite impressed.  Exploring those kind of options will be important for him.   10. For mood, he does continue with fluoxetine.   11. For stroke risk reduction, primary factors controlling blood pressure and he is on lisinopril.  Defer that to his primary provider, Dr. Edmond Sevilla, for dose adjustments as needed.   12. I would like to follow up with Mac in Rehabilitation Clinic in about 2 months' time.  He will contact us sooner if functional rehab issues arise.      Forty-five minutes spent in direct patient interaction, greater than 50% counseling and education of the above-detailed items.      MD DEB Nicolas MD             D: 2017 11:35   T: 2017 13:25   MT: tb      Name:     PETEY DE LEON   MRN:      [id number]        Account:      [id number]   :      1965           Service Date:  03/15/2017      Document: [id number]

## 2017-03-23 ENCOUNTER — TELEPHONE (OUTPATIENT)
Dept: FAMILY MEDICINE | Facility: CLINIC | Age: 52
End: 2017-03-23

## 2017-03-23 NOTE — TELEPHONE ENCOUNTER
"Red Flag Call:   Mac started Prozac almost a month ago. Every day for the last week he's feeling very hopeless. This is worse than before he started taking it.   He has been feeling like \"it would just be easier to be done with life than to face what I am facing.\" He also says \"There is no way I could do it because my son would be the one to fine me and I would never do that.\"   He does have guns in the house. He says he also thought about getting in the bathtub with an electrical device.   Given that he does have a plan, high lethality of this plan and accessibility RN advised he be seen at [facility]. He cannot drive due to his recent stroke. He verbally contracts with this RN to call his wife and have her come home from work and drive him to the ER.   Mac also scheduled an appointment for Monday with PCP for follow up.     FYI to provider.     Chloe Azar RN   March 23, 2017 1:21 PM  [facility] Triage   [phone]   "

## 2017-03-23 NOTE — TELEPHONE ENCOUNTER
Reason for call:  Patient reporting a symptom    Symptom or request: Pt states that he is feeling hopeless since starting Prozac and its getting progressively worse.    Duration (how long have symptoms been present): since starting     Have you been treated for this before? Yes    Additional comments: [facility] nurses    Phone Number patient can be reached at:  Home number on file [phone] (home)    Best Time:  any    Can we leave a detailed message on this number:  YES    Call taken on 3/23/2017 at 1:03 PM by Amber Walter

## 2017-03-23 NOTE — TELEPHONE ENCOUNTER
Reviewed and agreed with plan. Please follow up with him in 15 minutes to half hour to make sure he's made the call to his wife. I did call and LVM for his wife.  Thank you.  Edmond Cano, MPAS, PA-C

## 2017-03-24 ENCOUNTER — TELEPHONE (OUTPATIENT)
Dept: PHYSICAL THERAPY | Facility: CLINIC | Age: 52
End: 2017-03-24

## 2017-03-24 ENCOUNTER — HOSPITAL ENCOUNTER (OUTPATIENT)
Dept: OCCUPATIONAL THERAPY | Facility: CLINIC | Age: 52
Setting detail: THERAPIES SERIES
End: 2017-03-24
Attending: PHYSICAL MEDICINE & REHABILITATION
Payer: COMMERCIAL

## 2017-03-24 ENCOUNTER — HOSPITAL ENCOUNTER (OUTPATIENT)
Dept: PHYSICAL THERAPY | Facility: CLINIC | Age: 52
Setting detail: THERAPIES SERIES
End: 2017-03-24
Attending: PHYSICAL MEDICINE & REHABILITATION
Payer: COMMERCIAL

## 2017-03-24 PROCEDURE — 97110 THERAPEUTIC EXERCISES: CPT | Mod: GO | Performed by: OCCUPATIONAL THERAPIST

## 2017-03-24 PROCEDURE — 40000125 ZZHC STATISTIC OT OUTPT VISIT: Performed by: OCCUPATIONAL THERAPIST

## 2017-03-24 PROCEDURE — 97112 NEUROMUSCULAR REEDUCATION: CPT | Mod: GP | Performed by: PHYSICAL THERAPIST

## 2017-03-24 PROCEDURE — 97116 GAIT TRAINING THERAPY: CPT | Mod: GP | Performed by: PHYSICAL THERAPIST

## 2017-03-24 PROCEDURE — 40000719 ZZHC STATISTIC PT DEPARTMENT NEURO VISIT: Performed by: PHYSICAL THERAPIST

## 2017-03-24 PROCEDURE — 97535 SELF CARE MNGMENT TRAINING: CPT | Mod: GO | Performed by: OCCUPATIONAL THERAPIST

## 2017-03-24 NOTE — DISCHARGE INSTRUCTIONS
3/24/17    Mac - I talked to Chloe at the Doc office.   She wanted me to give you the CRISIS hotline number in case you need to talk to someone:  [phone]     I talked to Shannan  - she will come home early and work from home today.    Be sure to attend the Monday lee ann't w/ Dr LOPEZ at 3pm.   Ask about Health Psychology.      We are also wondering about our [facility] day program and/or other outlets for you during this time.   Laura ROLAND will talk to you more about this.    I hope your weekend is good Mac.      Rochelle [email or web address]  [phone]  Pager [phone]  :  [phone]

## 2017-03-24 NOTE — IP AVS SNAPSHOT
MRN:[id number]                      After Visit Summary   3/24/2017    Maldonado Mendiola    MRN: [id number]           Visit Information        Provider Department      3/24/2017  9:00 AM Rochelle Capone, PT [facility] [place], Physical Therapy - Outpatient        Your next 10 appointments already scheduled     Mar 27, 2017  9:30 AM CDT   Treatment 60 with Laura Johns, OT   [facility] ([facility])    [address], [address]  [place] [address]   [phone]            Mar 27, 2017 10:30 AM CDT   Treatment 45 with Jessie Skaggs, SLP   [facility] [place], Speech Therapy - Oupatient ([facility])    [address], [address]  [place] [address]   [phone]            Mar 27, 2017  3:00 PM CDT   SHORT with Edmond Cano PA-C   [facility] ([facility]    [address] [place] [id number]   [phone]            Mar 31, 2017  8:30 AM CDT   Treatment 60 with Raghav Matute, OT   [facility])    [address], [address]  [place] [address]   [phone]            Mar 31, 2017  9:30 AM CDT   Treatment 60 with Rochelle Capone, PT   [facility])    [address], [address]  [place] [address]   [phone]            Apr 04, 2017  9:30 AM CDT   Treatment 60 with Laura Johns, OT   [facility] ([facility])    [address], [address]  [place] [address]   [phone]            Apr 07, 2017  8:30 AM CDT   Treatment 60 with Raghav Matute, OT   [facility] ([facility]  "[facility])    [address]  [place] [address]   [phone]            Apr 07, 2017  9:30 AM CDT   Treatment 60 with Rochelle Capone, [place]   [facility], Physical Therapy - Outpatient ([facility])    [address], [address]  [place] [address]   [phone]            Apr 11, 2017  9:30 AM CDT   Treatment 60 with Laura Johns OT   [facility], Occupational Therapy - Outpatient ([facility])    [address], [address]  [place] [address]   [phone]            Apr 14, 2017  9:30 AM CDT   Treatment 60 with Laura Johns OT   [facility], Occupational Therapy - Outpatient ([facility])    [address], [address]  [place] [address]   [phone]                Further instructions from your care team       3/24/17    Mac - I talked to Chloe at the Doc office.   She wanted me to give you the CRISIS hotline number in case you need to talk to someone:  [phone]     I talked to Shannan  - she will come home early and work from home today.    Be sure to attend the Monday lee ann't w/ Dr LOPEZ at 3pm.   Ask about Health Psychology.      We are also wondering about our [facility] center day program and/or other outlets for you during this time.   Laura ROLAND will talk to you more about this.    I hope your weekend is good Mac.      Rochelle Capone PT  [email or web address]  [phone]  Pager [phone]  :  [phone]    [employer] Information     [employer] lets you send messages to your doctor, view your test results, renew your prescriptions, schedule appointments and more. To sign up, go to [email or web address] . Click on \"Log in\" on the left side of the screen, which will take you to the Welcome page. Then click on \"Sign up Now\" on the right side of the page.     You will be asked to enter the " access code listed below, as well as some personal information. Please follow the directions to create your username and password.     Your access code is: [email or web address]  Expires: 2017  1:15 PM     Your access code will  in 90 days. If you need help or a new code, please call your [facility] clinic or [phone].        Care EveryWhere ID     This is your Care EveryWhere ID. This could be used by other organizations to access your [facility] medical records  [id number]

## 2017-03-24 NOTE — PROGRESS NOTES
03/24/17 0900   Signing Clinician's Name / Credentials   Signing clinician's name / credentials Laura Johns OTR/L, ATP   Session Number   Session Number 20/estimated 30; Limited to 60 COMBINED PT/OT/SLP visits per CALENDAR YEAR, HARD MAX.   Progress/Recertification   Progress Note Due 04/19/17   OT Goal 1   Goal Identifier Long-term Goal   Goal Description Pt will demonstrate use of R UE at at least 50% of baseline AROM and strength to allow for ability to complete tasks such as feed self, complete hygiene and two handed tasks such as IADL in prep for return to work.   Target Date 07/26/17   OT Goal 2   Goal Description Pt will demonstrate gross use of R UE in initial ranges of movement at all joints to use R UE as gross assist to his L UE and for unilateral tasks such as feeding self, opening doors, and holding objects.     Target Date 04/19/17   OT Goal 3   Goal Description Pt will demonstrate understanding of a HEP for R UE ROM/stretching to prevent contracture for optimal AROM as needed for ADL/IADL   Target Date 04/19/17   OT Goal 4   Goal Description Pt will demonstrate ability to use R hand for functional grasp and release for bringing food items such as chips to his mouth, by completing box and block assesment, for ADL/IADL with compliance with HEP with NMES HEP.   Target Date 04/19/17   OT Goal 5   Goal Description Pt will demonstrate functional attentional processing and recall as needed to return to safe ADL/IADL management including community mobility as measured by ability to complete divided attention visual attention task on 28msecsion scanboard WFL.   Target Date 04/19/17   Subjective Report   Subjective Report Some of the exercises are too easy.  I went to lunch and the record store with a friend.  I also drove when I was in [place].     Self Care/Home Management   Minutes 45 Minutes   Skilled Intervention Meeting with PT for safe plan, functional use of R UE   Patient Response Patient reports  "gratefulness for talking about suicide ideas and helping with a plan.  Very receptive to our concerns and safety plan.  Patient excited about progress and has concerns about further progress.   Treatment Detail Meeting s/p Pt with patient, PT and PT student.  Created a plan as PT called PT  and wife dicussing that as a team we feel he needs to not be alone.  Wife was going to meet him at home.  Issued number for crisis line.  F/u with  on Monday.  Suggested our day program here and healthy [facility] referral.  AFter meeting had dicussion about plan to get out of his home as the wall are closing in.  He is starting to drive and feels empowered by this.  Will plan for his assesment soon.  He is encouraged by attending karate with his son.  He also was able to meet with a friend recently and wants to do this more.  Suggested asking this friend to keep a weekly apt. for something he can look forward to.  Also thought about other things he could keep for weekly apts. in order to have things on the calender that can keep he accountable and positive.  He is also happy about the weather, reporting he will get outside to walk and get fresh air. Patient has concerns about his shoulder subluxation.  Discussed slings vs no sling.  Education and training of \"setting\" shoulder in order to activate muscles while not using sling.  Patient able to demo this in clinic and no subluxation gap noted.  Education on functional task completion with affected UE.  PAtient able to turn lights on and off- toggle and switch.  Educated in use of universal cuff for feeding self.  Diffuclty wtih pronation to grab food but able to bring to mouth.  Mimic'd this in clinic and issued cuff for patient to trial at home.  Also trailed grasp and release of a cup and using L hand to assist as needed.  Patient then able to grasp and relase 2\" blocks from blocks and blocks assesment 5x on tabletop and then 5x with actually assement but needed some AAROM at " forarm.  Patient excited about this.     Progress all goals continue to progress, patient now able to grasp blocks and functionall use arm. Goal 2 and 4 needign to be upgraded.   Therapeutic Procedure/Exercise   Minutes 25 Minutes   Skilled Intervention UE HEP grading, adding resistance   Patient Response Patient reporting that HEP exercises are getting easier and there a few he wants to modify.   Treatment Detail Patient demo'd seated pull downs, while using L UE to hold band.  Educated on proper form for full range of motion.  Issued new green TB and had patient demo with therapist holding for full range.  Problem solved through where he could mimic this at home.  He will now stand and side of stairs and use ballister to wrap green band around for rows and pull downs, adding L hand for possible carry over when able.  Patient reports using weight on shoulder for elevation.  Grading of suping pro/rectration.  Added cane with glove for chest press then punch as end.  Education on quality vs quantity, completing 30 full range slow up and down exercises vs 60 fast and less than full range exercises.     Progress HEP goals progressing, addition of harder band with standing for some exercises.   Education   Learner Patient   Readiness Acceptance;Eager   Method Explanation;Demonstration   Response Verbalizes understanding;Demonstrates understanding   Communication with other professionals   Communication with other professionals Huddle with PT prior to session and then with patient for dicussion of safe plan due to suicidal ideation.     Plan   Homework Added supine chest press with punch with cane and glove as well as green band for exercises.   Updates to plan of care Will be adding pool therapy soon as well as scheduilng driving exam.   Plan for next session f/u on functional task such as eating with cuff and holding cup.  [employer] and other driving test prep.  Pinch activities such as box and block.  NDT  weightbearing and functiona reach tasks.   Comments   Comments Progress note- Patient seen for another 10 OT visits for a total of 20 visits.  Focus continues to be on function of R UE and hand.  Patient extremely motivated and completing HEP daily. Patient is now sleeping better but not consistent and continues to struggle with depression.  Was started on Prozac but currently having some suicidal thoughts.  Huddle with PT and patient for plan and recommendations.  Wife called with concerns, but focus on how we can help patient to integrate out of the home activities.  Patient now also having new R hand edema- measurements are slowly decreasing.  Training in massage, postioning, and exercises to decrease this.  Continued grading of HEP taking place- patient completing more reps, using green TB, working against gravity and getting more movement overall of UE.  Patient also using home NMES unit for hand function and reports being able to individually activate fingers.  Patient also completing arm biking at home with his hand jony for .  Patient demo'd today he was able to grasp and release blocks 5x- new goal noted above.  He is also able to use R hand for functional activities such as lights and fist bumps.  Goal also will be updated for harder activities.  Dyn[employer] TBA as patient is startign to drive.  Continued OT services needed to safely progress HEP and increase functional use of R UE.       Total Session Time   Total Session Time 70   Electronically signed by:  Laura Johns OTR/L, ATP       Occupational Therapist, Assistive   [phone]      fax: [phone]      [email or web address], [address]  [address]  [place] [address]

## 2017-03-24 NOTE — TELEPHONE ENCOUNTER
Mac's physical therapist Rochelle called clinic today stating that Mac did not go to the ER last night and doesn't intend to. He spoke to his wife and they feel it is safe for him just to wait until Monday to see Joshua. He is saying he will not kill himself between now and then. Rochelle however is still concerned about him being alone. Her plan is to call his wife and see if she can leave work early and come get him directly from PT so he doesn't have to go home by himself. RN agreed with this plan. Also asked her to give him the Crisis Connection phone number, which she will do.     FYI to Joshua Smith.     Chloe Azar RN   March 24, 2017 9:24 AM  [facility] Triage   [phone]

## 2017-03-27 ENCOUNTER — OFFICE VISIT (OUTPATIENT)
Dept: FAMILY MEDICINE | Facility: CLINIC | Age: 52
End: 2017-03-27
Payer: COMMERCIAL

## 2017-03-27 VITALS
HEART RATE: 104 BPM | TEMPERATURE: 98.3 F | SYSTOLIC BLOOD PRESSURE: 110 MMHG | DIASTOLIC BLOOD PRESSURE: 72 MMHG | WEIGHT: 161 LBS | BODY MASS INDEX: 21.84 KG/M2

## 2017-03-27 DIAGNOSIS — F43.21 ADJUSTMENT DISORDER WITH DEPRESSED MOOD: ICD-10-CM

## 2017-03-27 DIAGNOSIS — I69.919 COGNITIVE DEFICITS AS LATE EFFECT OF CEREBROVASCULAR DISEASE: ICD-10-CM

## 2017-03-27 DIAGNOSIS — G81.91 RIGHT HEMIPARESIS (H): ICD-10-CM

## 2017-03-27 DIAGNOSIS — G47.00 INSOMNIA, UNSPECIFIED TYPE: ICD-10-CM

## 2017-03-27 DIAGNOSIS — I61.9 HEMORRHAGIC STROKE (H): Primary | ICD-10-CM

## 2017-03-27 DIAGNOSIS — R45.851 SUICIDAL IDEATION: ICD-10-CM

## 2017-03-27 PROCEDURE — 99214 OFFICE O/P EST MOD 30 MIN: CPT | Performed by: PHYSICIAN ASSISTANT

## 2017-03-27 RX ORDER — BUPROPION HYDROCHLORIDE 150 MG/1
TABLET ORAL
Qty: 14 TABLET | Refills: 1 | Status: SHIPPED | OUTPATIENT
Start: 2017-03-27 | End: 2017-05-17

## 2017-03-27 RX ORDER — TRAZODONE HYDROCHLORIDE 50 MG/1
TABLET, FILM COATED ORAL
Qty: 180 TABLET | Refills: 1 | Status: SHIPPED | OUTPATIENT
Start: 2017-03-27 | End: 2017-09-22

## 2017-03-27 NOTE — PATIENT INSTRUCTIONS
1. To schedule therapy - [phone].     [facility]   Discharged by : Augusta GOLDBERG Certified Medical Assistant (AAMA)   Paper scripts provided to patient : 0  If you have any questions regarding to your visit please contact your care team:   Team [facility] Hours Telephone Number   SASHA Sow Dr., PA-C    7am-7pm Monday - Thursday   7am-5pm Fridays  [phone]   (Appointment scheduling available 24/7)   RN Line   [phone] option 2       What options do I have for visits at the clinic other than the traditional office visit?   To expand how we care for you, many of our providers are utilizing electronic visits (e-visits) and telephone visits, when medically appropriate, for interactions with their patients rather than a visit in the clinic. We also offer nurse visits for many medical concerns. Just like any other service, we will bill your insurance company for this type of visit based on time spent on the phone with your provider. Not all insurance companies cover these visits. Please check with your medical insurance if this type of visit is covered. You will be responsible for any charges that are not paid by your insurance.     E-visits via [employer]: generally incur a $35.00 fee.     Telephone visits:   Time spent on the phone: *charged based on time that is spent on the phone in increments of 10 minutes. Estimated cost:   5-10 mins $30.00   11-20 mins. $59.00   21-30 mins. $85.00   Use [employer] (secure email communication and access to your chart) to send your primary care provider a message or make an appointment. Ask someone on your Team how to sign up for [employer].   For a Price Quote for your services, please call our Consumer Price Line at [phone].   As always, Thank you for trusting us with your health care needs!                [facility]:    Scheduling Appointments  Harpreet Ramirez  [facility]  Call: [phone]    [facility]  Call: [phone]    [facility]  Call: [phone]

## 2017-03-27 NOTE — MR AVS SNAPSHOT
After Visit Summary   3/27/2017    Maldonado Mendiola    MRN: [id number]           Patient Information     Date Of Birth          1965        Visit Information        Provider Department      3/27/2017 3:00 PM Edmond Cano PA-C [facility]        Today's Diagnoses     Hemorrhagic stroke (H)    -  1    Right hemiparesis (H)        Cognitive deficits as late effect of cerebrovascular disease        Adjustment disorder with depressed mood        Suicidal ideation        Insomnia, unspecified type          Care Instructions    1. To schedule therapy - [phone].     [facility]   Discharged by : Augusta GOLDBERG Certified Medical Assistant (AAMA)   Paper scripts provided to patient : 0  If you have any questions regarding to your visit please contact your care team:   [facility] Hours Telephone Number   SASHA Sow Dr., PA-C    7am-7pm Monday - Thursday   7am-5pm Fridays  [phone]   (Appointment scheduling available 24/7)   RN Line   [phone] option 2       What options do I have for visits at the clinic other than the traditional office visit?   To expand how we care for you, many of our providers are utilizing electronic visits (e-visits) and telephone visits, when medically appropriate, for interactions with their patients rather than a visit in the clinic. We also offer nurse visits for many medical concerns. Just like any other service, we will bill your insurance company for this type of visit based on time spent on the phone with your provider. Not all insurance companies cover these visits. Please check with your medical insurance if this type of visit is covered. You will be responsible for any charges that are not paid by your insurance.     E-visits via [facility] [employer]: generally incur a $35.00 fee.     Telephone visits:   Time spent on the phone: *charged based on time that is spent on the  phone in increments of 10 minutes. Estimated cost:   5-10 mins $30.00   11-20 mins. $59.00   21-30 mins. $85.00   Use [employer] (secure email communication and access to your chart) to send your primary care provider a message or make an appointment. Ask someone on your Team how to sign up for [employer].   For a Price Quote for your services, please call our [employer] Line at [phone].   As always, Thank you for trusting us with your health care needs!                [facility]:    Scheduling Appointments  [facility]  Call: [phone]    [facility]  Call: [phone]    [facility]  Call: [phone]                  Follow-ups after your visit        Additional Services     MENTAL HEALTH REFERRAL       Your provider has referred you to: [facility]: [facility] - Counseling (Individual/Couples/Family) - [place] or [place]     All scheduling is subject to the client's specific insurance plan & benefits, provider/location availability, and provider clinical specialities.  Please arrive 15 minutes early for your first appointment and bring your completed paperwork.    Please be aware that coverage of these services is subject to the terms and limitations of your health insurance plan.  Call member services at your health plan with any benefit or coverage questions.                  Your next 10 appointments already scheduled     Mar 31, 2017  8:30 AM CDT   Treatment 60 with Raghav [facility]ute, OT   [facility]    [address]  [place] [address]   [phone]            Mar 31, 2017  9:30 AM CDT   Treatment 60 with Rochelle Capone, PT   [facility], Physical Therapy - Outpatient ([facility])    [address]  [place]  [place] [address]   [phone]            Apr 03, 2017 11:15 AM CDT   Treatment 45 with Jessie Skaggs, SLP   [facility] ([facility])    [address]  [place] [address]   [phone]            Apr 04, 2017  9:30 AM CDT   Treatment 60 with Laura Johns, OT   [facility] ([facility])    [address]  [place] [address]   [phone]            Apr 07, 2017  8:30 AM CDT   Treatment 60 with Raghav Matute, OT   [facility] ([facility])    [address]  [place] [address]   [phone]            Apr 07, 2017  9:30 AM CDT   Treatment 60 with Rochelle Capone, PT   [facility] ([facility])    [address]  [place] [address]   [phone]            Apr 11, 2017  9:30 AM CDT   Treatment 60 with Laura Johns, OT   [facility] ([facility])    [address]  [place] [address]   [phone]            Apr 14, 2017  9:30 AM CDT   Treatment 60 with Laura Johns, OT   [facility] ([facility])    [address]  [place] [address]   [phone]            Apr 14, 2017 10:30 AM CDT   Treatment 60 with Rochelle Capone, PT   [facility] ([facility])    [address]  [place] [address]   [phone]            Apr 17, 2017 10:15 AM CDT   Treatment 45 with Jessie Skaggs, SLP   [facility] - [facility]ient ([facility]  "[facility]    [address]  [place] [address]   [phone]              Who to contact     If you have questions or need follow up information about today's clinic visit or your schedule please contact [facility] directly at [phone].  Normal or non-critical lab and imaging results will be communicated to you by MyChart, letter or phone within 4 business days after the clinic has received the results. If you do not hear from us within 7 days, please contact the clinic through MyChart or phone. If you have a critical or abnormal lab result, we will notify you by phone as soon as possible.  Submit refill requests through [employer] or call your pharmacy and they will forward the refill request to us. Please allow 3 business days for your refill to be completed.          Additional Information About Your Visit        MyChart Information     [employer] lets you send messages to your doctor, view your test results, renew your prescriptions, schedule appointments and more. To sign up, go to [email or web address] . Click on \"Log in\" on the left side of the screen, which will take you to the Welcome page. Then click on \"Sign up Now\" on the right side of the page.     You will be asked to enter the access code listed below, as well as some personal information. Please follow the directions to create your username and password.     Your access code is: [email or web address]  Expires: 2017  1:15 PM     Your access code will  in 90 days. If you need help or a new code, please call your [facility] or [phone].        Care EveryWhere ID     This is your Care EveryWhere ID. This could be used by other organizations to access your [facility] medical records  [id number]        Your Vitals Were     Pulse Temperature BMI (Body Mass Index)             104 98.3  F (36.8  C) (Oral) 21.84 kg/m2          Blood Pressure from Last 3 Encounters:   17 110/72 "   03/15/17 107/73   03/14/17 118/72    Weight from Last 3 Encounters:   03/27/17 161 lb (73 kg)   03/15/17 163 lb 11.2 oz (74.3 kg)   02/28/17 169 lb (76.7 kg)              We Performed the Following     MENTAL HEALTH REFERRAL          Today's Medication Changes          These changes are accurate as of: 3/27/17  3:46 PM.  If you have any questions, ask your nurse or doctor.               Start taking these medicines.        Dose/Directions    buPROPion 150 MG 24 hr tablet   Commonly known as:  WELLBUTRIN XL   Used for:  Adjustment disorder with depressed mood, Suicidal ideation, Cognitive deficits as late effect of cerebrovascular disease   Started by:  Edmond Cano PA-C        1 daily   Quantity:  14 tablet   Refills:  1         Stop taking these medicines if you haven't already. Please contact your care team if you have questions.     FLUoxetine 20 MG capsule   Commonly known as:  PROzac   Stopped by:  Edmond Cano PA-C                Where to get your medicines      These medications were sent to [id number] - [place], [place] - [address]  [address], [place] [address]     Phone:  [phone]     buPROPion 150 MG 24 hr tablet    traZODone 50 MG tablet                Primary Care Provider Office Phone # Fax #    Edmond Cano PA-C [phone]       [facility] [address] [address]        Thank you!     Thank you for choosing [facility]  for your care. Our goal is always to provide you with excellent care. Hearing back from our patients is one way we can continue to improve our services. Please take a few minutes to complete the written survey that you may receive in the mail after your visit with us. Thank you!             Your Updated Medication List - Protect others around you: Learn how to safely use, store and throw away your medicines  at www.disposemymeds.org.          This list is accurate as of: 3/27/17  3:46 PM.  Always use your most recent med list.                   Brand Name Dispense Instructions for use    baclofen 10 MG tablet    LIORESAL    90 tablet    Take 0.5-1 tablets (5-10 mg) by mouth 3 times daily       buPROPion 150 MG 24 hr tablet    WELLBUTRIN XL    14 tablet    1 daily       hydrOXYzine 25 MG tablet    ATARAX    60 tablet    1 to 2 tablets every 6 hours as needed for panic/anxiety       lisinopril 10 MG tablet    PRINIVIL/ZESTRIL    90 tablet    10 mg       propranolol 20 MG tablet    INDERAL    90 tablet    Take 1 tablet (20 mg) by mouth as needed Daily for panic / anxiety       traZODone 50 MG tablet    DESYREL    180 tablet    1 to 3 tablets, 30 minutes before bedtime

## 2017-03-27 NOTE — NURSING NOTE
Chief Complaint   Patient presents with     Recheck Medication     prozac     Health Maintenance     Hep C,lipid,colon       Initial /72 (BP Location: Left arm, Patient Position: Chair, Cuff Size: Adult Regular)  Pulse 104  Temp 98.3  F (36.8  C) (Oral)  Wt 161 lb (73 kg)  BMI 21.84 kg/m2 Estimated body mass index is 21.84 kg/(m^2) as calculated from the following:    Height as of 3/15/17: 6' (1.829 m).    Weight as of this encounter: 161 lb (73 kg).  Medication Reconciliation: complete   Idania Burnham ma

## 2017-03-27 NOTE — PROGRESS NOTES
SUBJECTIVE:                                                    Maldonado Mendiola is a 52 year old male who presents to clinic today for the following health issues:    Medication Followup of prozac    Taking Medication as prescribed: yes    Side Effects:  None    Medication Helping Symptoms:  yes     Follow up mood. See last week's notes. Mac had a stroke a few months ago and is dealing with adjustments of weakness to his left side. He's doing home and physical therapy. He's quite diligent. He has a very supportive wife.     He has not had any major issues until last week when his mood took a downturn. Was feeling depressed, down, sad, and suicidal. He had active ideation and a plan. He reports that he stopped the Prozac and those symptoms completely resolved. He's quite happy since having stopped and is here to follow up. His mood today is good.     Patient Active Problem List   Diagnosis     Esophageal reflux     CARDIOVASCULAR SCREENING; LDL GOAL LESS THAN 160     Panic attack     History of stroke     Hemorrhagic stroke (H)     Right hemiparesis (H)     Dysarthria     Cognitive deficits as late effect of cerebrovascular disease     Cognitive and behavioral changes     Slow transit constipation     Adjustment insomnia      Current Outpatient Prescriptions   Medication     buPROPion (WELLBUTRIN XL) 150 MG 24 hr tablet     traZODone (DESYREL) 50 MG tablet     lisinopril (PRINIVIL/ZESTRIL) 10 MG tablet     hydrOXYzine (ATARAX) 25 MG tablet     propranolol (INDERAL) 20 MG tablet     baclofen (LIORESAL) 10 MG tablet     No current facility-administered medications for this visit.         Problem list and histories reviewed & adjusted, as indicated.  Additional history: as documented    Labs reviewed in EPIC    Reviewed and updated as needed this visit by clinical staff  Tobacco  Allergies  Med Hx  Surg Hx  Fam Hx  Soc Hx      Reviewed and updated as needed this visit by Provider         ROS:  Constitutional,  HEENT, cardiovascular, pulmonary, GI, , musculoskeletal, neuro, skin, endocrine and psych systems are negative, except as otherwise noted.    OBJECTIVE:                                                    /72 (BP Location: Left arm, Patient Position: Chair, Cuff Size: Adult Regular)  Pulse 104  Temp 98.3  F (36.8  C) (Oral)  Wt 161 lb (73 kg)  BMI 21.84 kg/m2  Body mass index is 21.84 kg/(m^2).  GENERAL: healthy, alert and no distress  Psych: Appropriate appearance.  Alert and oriented times 3; coherent speech, normal   rate and volume, able to articulate logical thoughts, able   to abstract reason, no tangential thoughts, no hallucinations   or delusions.  Normal behavior.  His affect is bright.         ASSESSMENT/PLAN:                                                      (I61.9) Hemorrhagic stroke (H)  (primary encounter diagnosis)  Comment:   Plan: Adjusting - stable     (G81.91) Right hemiparesis (H)  Comment:   Plan: Adjusting - stable     (I69.919) Cognitive deficits as late effect of cerebrovascular disease  Comment:   Plan: buPROPion (WELLBUTRIN XL) 150 MG 24 hr tablet,         MENTAL HEALTH REFERRAL        Adjusting , see below     (F43.21) Adjustment disorder with depressed mood  Comment:   Plan: buPROPion (WELLBUTRIN XL) 150 MG 24 hr tablet,         MENTAL HEALTH REFERRAL        As noted     (R45.851) Suicidal ideation  Comment:   Plan: buPROPion (WELLBUTRIN XL) 150 MG 24 hr tablet,         MENTAL HEALTH REFERRAL        Resolved    (G47.00) Insomnia, unspecified type  Comment:   Plan: traZODone (DESYREL) 50 MG tablet        Persistent but stable     Reviewed. His suicidal thoughts resolved. He's really done a good job of self cares and diligence since he left transitional care. He has a supportive family. He wants to work on self cares and also wants to see a therapist. He wants to be on something for low mood and energy and I did offer Bupropion. This prescription is given with a discussion of  side effects, risks and proper use.  Instructions are given to follow up if not improving or symptoms change or worsen as discussed.     I spent 25 minutes with Mac over 50% of time was counseling. He will see me in a week or two. Sooner if any issues.     Verbal agreement for safety plan was discussed in detail.     CARO LOPEZ PA-C  [facility]

## 2017-03-29 DIAGNOSIS — G81.91 RIGHT HEMIPARESIS (H): Primary | ICD-10-CM

## 2017-03-29 DIAGNOSIS — Z86.73 HISTORY OF STROKE: ICD-10-CM

## 2017-03-30 ENCOUNTER — CARE COORDINATION (OUTPATIENT)
Dept: PHYSICAL MEDICINE AND REHAB | Facility: CLINIC | Age: 52
End: 2017-03-30

## 2017-03-30 NOTE — PROGRESS NOTES
PT and OT orders from Dr Beauchamp were faxed to [facility] in [place] [phone] along with Dr Beauchamp's PMR clinic note dated 3/15/17. [facility]

## 2017-03-30 NOTE — PROGRESS NOTES
Dr Beauchamp's PMR clinic note dated 3/15/17 [facility]  Benefits [phone] along with patient;s signed release of information. Claim number [id number].

## 2017-04-13 NOTE — PROGRESS NOTES
Outpatient Occupational Therapy Discharge Note     Patient: Petey Mendiloa  : 1965  Insurance:   Payor/Plan Subscriber Name Rel Member # Group #   BCBS - BCBS OUT OF PETEY SHANKS  [id number]       [id number]       Beginning/End Dates of Reporting Period:  17 to 3/24/17    Referring Provider: Dr. Miguel Beauchamp    Therapy Diagnosis: Impaired ADL/IADL due to R sided hemiparesis from CVA    Client Self Report: Some of the exercises are too easy.  I went to lunch and the record store with a friend.  I also drove when I was in [place].   Pt decided for longer term support he wanted to change services to [facility] to allow him to use their pool program and exercise program there.    Objective Measurements:     Objective Measure: on 3/7/17: R shoulder PROM   Details: flexion to 140 and then after treatment 165 degrees and pain greater but soft end range feel; ext rot 45 degrees and after treatment today 60 degrees           Objective Measure: From 3/17/17: R hand edema after wearing sling to therapy today at start of OT session   Details: wrist 17.4 cm; MCP's 19.8 cm; R index distal to MCP 7.3 cm           At time of DC: muscle grading as follows in R UE: shoulder flex 2/5, ext 4-/5; ext rot 1+/5; elbow flex 3-/5; elbow ext 3-/5; forearm supination 1+/5; wrist ext 1+/5; finger ext 1+/5; flexion 3/5;      Outcome Measures (most recent score): AMPAC not done at DC due to unexpected discharge         Goals:     Goal Identifier Long-term Goal   Goal Description Pt will demonstrate use of R UE at at least 50% of baseline AROM and strength to allow for ability to complete tasks such as feed self, complete hygiene and two handed tasks such as IADL in prep for return to work.   Target Date 17   Date Met   not yet met   Progress:Able to use R UE grossly to turn wall light switch off and on; grossly grasp items; touch his chin, but use compared to baseline is approx less then 10%  functionally     Goal Identifier     Goal Description Pt will demonstrate gross use of R UE in initial ranges of movement at all joints to use R UE as gross assist to his L UE and for unilateral tasks such as feeding self, opening doors, and holding objects.     Target Date 04/19/17   Date Met   not yet met   Progress:Pt gaining strength and near ready to start gross feeding of self with finger food or universal cuff.  He has gained motion in initial ranges of all movements of R UE scapula to hand     Goal Identifier     Goal Description Pt will demonstrate understanding of a HEP for R UE ROM/stretching to prevent contracture for optimal AROM as needed for ADL/IADL   Target Date 04/19/17   Date Met   3/24/17   Progress:Has needed review of HEP for stretches as pt has had some R shoulder pain since onset of stroke and is limited by pain and end range tightness of his shoulder. He has onset of some increased tone in shoulder girdle internal rotators and with trigger point massage this has aided ROM of shoulder.He needs to also stay on top of PROM/stretch of finger and wrist flexors and forearm pronators to manage early spasticity/tone there.     Goal Identifier     Goal Description Pt will demonstrate ability to use R hand for functional grasp and release for bringing food items such as chips to his mouth, by completing box and block assesment, for ADL/IADL with compliance with HEP with [facility] HEP.   Target Date 04/19/17   Date Met   Not yet met   Progress:Able to use R UE grossly, [facility] home unit has helped a lot with pt using daily and this has assisted current gross grasp and some finger ext when not in use.     Goal Identifier     Goal Description Pt will demonstrate functional attentional processing and recall as needed to return to safe ADL/IADL management including community mobility as measured by ability to complete divided attention visual attention task on [employer] scanboard WFL.   Target Date 04/19/17    Date Met   not yet met   Progress:focus as been on R UE function but pt is good with follow through and attentional process in clinic with HEP and functional mobility.He did resume driving on his own despite recommendation for behind the wheel assessment and resources were to be reviewed this but then pt changed care to [facility].       Progress Toward Goals:   Progress this reporting period: Progress note- Patient seen for another 10 OT visits for a total of 20 visits since eval.  Focus continues to be on function of R UE and hand.  Patient extremely motivated and completing HEP daily. Patient is now sleeping better but not consistent and continues to struggle with depression and anxiety.  Was started on Prozac and was recently having some suicidal thoughts. Patient now also having new R hand edema- measurements are slowly decreasing.  Training in massage, postioning, and exercises to decrease this. He has been using [employer] sling since onset of stroke but finds he sometimes likes to put R arm in a standard sling to keep edema down and hand elevated. He was issued a R edema glove to use also. Continued grading of HEP taking place- patient completing more reps, using green TB, working against gravity and getting more movement overall of UE.  Patient also using home [facility] unit for hand function and reports being able to individually activate fingers.  Patient also completing arm biking at home with his hand jony for grasp of handle on R.  Patient demo'd today he was able to grasp and release blocks 5x- new goal noted above.  He is also able to use R hand for functional activities such as lights and fist bumps.  Goal also will be updated for harder activities.  Marko was TBA as patient is starting to drive.  Continued OT services needed to safely progress HEP, for neuromuscular re-education, edema management of R UE, and to  increase functional use of R UE.            Plan:  Changes to therapy plan of care:  PT prefers to transfer his care to [facility] in [place] where he was referred for pool therapy. This will be good for pt to allow ongoing social and exer support with their maintenance exer program there. He was also referred to a local PT program to receive student treatment given his good progress but limited insurance visits per year.  Discharge:    Reason for Discharge: PT chose to change therapy to another facility.        Discharge Plan: Patient to continue home program.  Other services: [facility] outpatient ongoing OT recommended.    DENISE Balderrama/MURRAY, MSCS  Occupational Therapy  [facility]  [address], [address]  [place] [address]  Voice mail:[phone]  Fax: [phone]

## 2017-04-13 NOTE — ADDENDUM NOTE
Encounter addended by: Raghav Matute OT on: 4/13/2017 10:25 AM<BR>     Actions taken: Flowsheet data copied forward, Pend clinical note, Flowsheet accepted, Sign clinical note, Episode resolved

## 2017-04-17 ENCOUNTER — TELEPHONE (OUTPATIENT)
Dept: FAMILY MEDICINE | Facility: CLINIC | Age: 52
End: 2017-04-17

## 2017-04-17 NOTE — TELEPHONE ENCOUNTER
Reason for Call:  Other call back    Detailed comments: Patient's EMS Plus Unit is broken, he is going to  a new one with [facility].   He needs someone to help him reset the new one to old settings once he gets the new one.  He was told [facility] won't do that for him.      Phone Number Patient can be reached at: Home number on file [phone] (home)    Best Time: anytime    Can we leave a detailed message on this number? YES    Call taken on 4/17/2017 at 10:35 AM by Lina Zaragoza

## 2017-04-20 ENCOUNTER — TRANSFERRED RECORDS (OUTPATIENT)
Dept: HEALTH INFORMATION MANAGEMENT | Facility: CLINIC | Age: 52
End: 2017-04-20

## 2017-05-02 ENCOUNTER — OFFICE VISIT (OUTPATIENT)
Dept: PSYCHOLOGY | Facility: CLINIC | Age: 52
End: 2017-05-02
Attending: PHYSICIAN ASSISTANT
Payer: COMMERCIAL

## 2017-05-02 DIAGNOSIS — F33.1 MAJOR DEPRESSIVE DISORDER, RECURRENT EPISODE, MODERATE WITH ANXIOUS DISTRESS (H): Primary | ICD-10-CM

## 2017-05-02 PROCEDURE — 90834 PSYTX W PT 45 MINUTES: CPT | Performed by: SOCIAL WORKER

## 2017-05-02 ASSESSMENT — ANXIETY QUESTIONNAIRES
6. BECOMING EASILY ANNOYED OR IRRITABLE: SEVERAL DAYS
GAD7 TOTAL SCORE: 6
IF YOU CHECKED OFF ANY PROBLEMS ON THIS QUESTIONNAIRE, HOW DIFFICULT HAVE THESE PROBLEMS MADE IT FOR YOU TO DO YOUR WORK, TAKE CARE OF THINGS AT HOME, OR GET ALONG WITH OTHER PEOPLE: NOT DIFFICULT AT ALL
5. BEING SO RESTLESS THAT IT IS HARD TO SIT STILL: NOT AT ALL
1. FEELING NERVOUS, ANXIOUS, OR ON EDGE: NOT AT ALL
2. NOT BEING ABLE TO STOP OR CONTROL WORRYING: NEARLY EVERY DAY
3. WORRYING TOO MUCH ABOUT DIFFERENT THINGS: NOT AT ALL
7. FEELING AFRAID AS IF SOMETHING AWFUL MIGHT HAPPEN: NOT AT ALL

## 2017-05-02 ASSESSMENT — PATIENT HEALTH QUESTIONNAIRE - PHQ9: 5. POOR APPETITE OR OVEREATING: MORE THAN HALF THE DAYS

## 2017-05-02 NOTE — Clinical Note
Jann Lewis--Started with our patient today for his first counseling session.  I look forward to coordinating care with you in the future.  Thanks for the referral.  I was unable to complete the Dx assessment but will complete this on his next visit.  Let me know if you have any questions.  Best, Eliezer

## 2017-05-02 NOTE — MR AVS SNAPSHOT
MRN:[id number]                      After Visit Summary   5/2/2017    Maldonado Mendiola    MRN: [id number]           Visit Information        Provider Department      5/2/2017 11:30 AM [facility]      Your next 10 appointments already scheduled     May 16, 2017  8:30 AM CDT   Return Visit with Eliezer YUSUF Ruddy [facility] ([facility])    [address] [address]   [phone]            May 17, 2017  9:20 AM CDT   (Arrive by 9:05 AM)   Return Visit with Miguel Beauchamp MD   [facility] ([facility] [place])    [address]  3rd Floor  [facility] [id number] [phone]            May 23, 2017  8:30 AM CDT   Return Visit with Eliezer Fox [facility] ([facility])    [address] [address]   [phone]            May 31, 2017  8:30 AM CDT   Return Visit with Eliezer Fox [facility] ([facility])    [address] [address]   [phone]            Jun 19, 2017 10:00 AM CDT   New Sleep Patient with Sumeet Wheatley MD   [facility], Sleep Study ([facility])    [address] [facility] [address]   [phone]            Jul 19, 2017  8:30 AM CDT   (Arrive by 8:15 AM)   MR NECK W CONTRAST ANGIOGRAM with [address] [facility] MRI ([facility] [place])    [address]  1st Floor  [facility] [id number] [phone]           Take your medicines as usual, unless your doctor tells you not to. Bring a list of your current medicines to your exam (including vitamins, minerals and over-the-counter drugs).  You will be given intravenous contrast for  this exam. To prepare:   The day before your exam, drink extra fluids at least six 8-ounce glasses (unless your doctor tells you to restrict your fluids).   Have a blood test (creatinine test) within 30 days of your exam. Go to your clinic or Diagnostic Imaging Department for this test.  The MRI machine uses a strong magnet. Please wear clothes without metal (snaps, zippers). A sweatsuit works well, or we may give you a hospital gown.  Please remove any body piercings and hair extensions before you arrive. You will also remove watches, jewelry, hairpins, wallets, dentures, partial dental plates and hearing aids. You may wear contact lenses, and you may be able to wear your rings. We have a safe place to keep your personal items, but it is safer to leave them at home.   **IMPORTANT** THE INSTRUCTIONS BELOW ARE ONLY FOR THOSE PATIENTS WHO HAVE BEEN TOLD THEY WILL RECEIVE SEDATION OR GENERAL ANESTHESIA DURING THEIR MRI PROCEDURE:  IF YOU WILL RECEIVE SEDATION (take medicine to help you relax during your exam):   You must get the medicine from your doctor before you arrive. Bring the medicine to the exam. Do not take it at home.   Arrive one hour early. Bring someone who can take you home after the test. Your medicine will make you sleepy. After the exam, you may not drive, take a bus or take a taxi by yourself.   No eating 8 hours before your exam. You may have clear liquids up until 4 hours before your exam. (Clear liquids include water, clear tea, black coffee and fruit juice without pulp.)  IF YOU WILL RECEIVE ANESTHESIA (be asleep for your exam):   Arrive 1 1/2 hours early. Bring someone who can take you home after the test. You may not drive, take a bus or take a taxi by yourself.   No eating 8 hours before your exam. You may have clear liquids up until 4 hours before your exam. (Clear liquids include water, clear tea, black coffee and fruit juice without pulp.)  Please call the Imaging Department at your exam  site with any questions.            Jul 19, 2017  9:15 AM CDT   (Arrive by 9:00 AM)   MR HEAD W/O CONTRAST ANGIOGRAM with [id number]   [facility] MRI ([facility])    [address] [address] [phone]           Take your medicines as usual, unless your doctor tells you not to. Bring a list of your current medicines to your exam (including vitamins, minerals and over-the-counter drugs). Also bring the results of similar scans you may have had.  Please remove any body piercings and hair extensions before you arrive.  Follow your doctor s orders. If you do not, we may have to postpone your exam.  You will not have contrast for this exam. You do not need to do anything special to prepare.  The MRI machine uses a strong magnet. Please wear clothes without metal (snaps, zippers). A sweatsuit works well, or we may give you a hospital gown.   **IMPORTANT** THE INSTRUCTIONS BELOW ARE ONLY FOR THOSE PATIENTS WHO HAVE BEEN TOLD THEY WILL RECEIVE SEDATION OR GENERAL ANESTHESIA DURING THEIR MRI PROCEDURE:  IF YOU WILL RECEIVE SEDATION (take medicine to help you relax during your exam):   You must get the medicine from your doctor before you arrive. Bring the medicine to the exam. Do not take it at home.   Arrive one hour early. Bring someone who can take you home after the test. Your medicine will make you sleepy. After the exam, you may not drive, take a bus or take a taxi by yourself.   No eating 8 hours before your exam. You may have clear liquids up until 4 hours before your exam. (Clear liquids include water, clear tea, black coffee and fruit juice without pulp.)  IF YOU WILL RECEIVE ANESTHESIA (be asleep for your exam):   Arrive 1 1/2 hours early. Bring someone who can take you home after the test. You may not drive, take a bus or take a taxi by yourself.   No eating 8 hours before your exam. You may have clear liquids up until 4 hours before your  exam. (Clear liquids include water, clear tea, black coffee and fruit juice without pulp.)   You will spend four to five hours in the recovery room.  Please call the Imaging Department at your exam site with any questions.            Jul 19, 2017 10:00 AM CDT   (Arrive by 9:45 AM)   MR BRAIN W/O & W CONTRAST with [address] MRI ([facility] [place])    [address] [address] [phone]           Take your medicines as usual, unless your doctor tells you not to. Bring a list of your current medicines to your exam (including vitamins, minerals and over-the-counter drugs).  You will be given intravenous contrast for this exam. To prepare:   The day before your exam, drink extra fluids at least six 8-ounce glasses (unless your doctor tells you to restrict your fluids).   Have a blood test (creatinine test) within 30 days of your exam. Go to your clinic or Diagnostic Imaging Department for this test.  The MRI machine uses a strong magnet. Please wear clothes without metal (snaps, zippers). A sweatsuit works well, or we may give you a hospital gown.  Please remove any body piercings and hair extensions before you arrive. You will also remove watches, jewelry, hairpins, wallets, dentures, partial dental plates and hearing aids. You may wear contact lenses, and you may be able to wear your rings. We have a safe place to keep your personal items, but it is safer to leave them at home.   **IMPORTANT** THE INSTRUCTIONS BELOW ARE ONLY FOR THOSE PATIENTS WHO HAVE BEEN TOLD THEY WILL RECEIVE SEDATION OR GENERAL ANESTHESIA DURING THEIR MRI PROCEDURE:  IF YOU WILL RECEIVE SEDATION (take medicine to help you relax during your exam):   You must get the medicine from your doctor before you arrive. Bring the medicine to the exam. Do not take it at home.   Arrive one hour early. Bring someone who can take you home after the test. Your medicine will make you  sleepy. After the exam, you may not drive, take a bus or take a taxi by yourself.   No eating 8 hours before your exam. You may have clear liquids up until 4 hours before your exam. (Clear liquids include water, clear tea, black coffee and fruit juice without pulp.)  IF YOU WILL RECEIVE ANESTHESIA (be asleep for your exam):   Arrive 1 1/2 hours early. Bring someone who can take you home after the test. You may not drive, take a bus or take a taxi by yourself.   No eating 8 hours before your exam. You may have clear liquids up until 4 hours before your exam. (Clear liquids include water, clear tea, black coffee and fruit juice without pulp.)  Please call the Imaging Department at your exam site with any questions.            Jul 25, 2017  9:00 AM CDT   (Arrive by 8:45 AM)   Return Stroke with Franklin Hidalgo MD   [facility] Neurology ([facility] [place])    [address] [place] [address] [phone]              [employer] Information     [employer] gives you secure access to your electronic health record. If you see a primary care provider, you can also send messages to your care team and make appointments. If you have questions, please call your primary care clinic.  If you do not have a primary care provider, please call [phone] and they will assist you.        Care EveryWhere ID     This is your Care EveryWhere ID. This could be used by other organizations to access your [place] medical records  [id number]

## 2017-05-02 NOTE — PROGRESS NOTES
Progress Note - Initial Session    Client Name:  Maldonado Mendiola Date: 5/02/17         Service Type: Individual      Session Start Time: 11;30  Session End Time: 12;15      Session Length: 53 - 60      Session #: 1     Attendees: Client         Diagnostic Assessment in progress.  Unable to complete documentation at the conclusion of the first session due to difficulty with writing due to stroke and extensive social hx.      Mental Status Assessment:  Appearance:   Appropriate   Eye Contact:   Good   Psychomotor Behavior: Restless   Attitude:   Cooperative   Orientation:   All  Speech   Rate / Production: Normal    Volume:  Normal   Mood:    Depressed  Sad   Affect:    Blunted  Restricted  Worrisome   Thought Content:  Perservative  Rumination   Thought Form:  Goal Directed  Circumstantial  Insight:    Fair       Safety Issues and Plan for Safety and Risk Management:  Client denies current fears or concerns for personal safety.  Client reports the following current or recent suicidal ideation or behaviors: has fleeting thoughts without a plan and has strong protective factors on why he would not hurt self. .  Client denies current or recent homicidal ideation or behaviors.  Client denies current or recent self injurious behavior or ideation.  Client denies other safety concerns.  A safety and risk management plan has not been developed at this time, however client was given the after-hours number / 911 should there be a change in any of these risk factors.  Client reports there are firearms in the house. The firearms are secured in a locked space.      Diagnostic Criteria:  A) Recurrent episode(s) - symptoms have been present during the same 2-week period and represent a change from previous functioning 5 or more symptoms (required for diagnosis)   - Depressed mood. Note: In children and adolescents, can be irritable mood.     - Diminished interest or pleasure in all, or almost all, activities.    -  Significant weight loss when not dieting decrease in appetite.    - Decreased sleep.    - Psychomotor activity retardation.    - Fatigue or loss of energy.    - Feelings of worthlessness or inappropriate and excessive guilt.    - Diminished ability to think or concentrate, or indecisiveness.    - Recurrent thoughts of death (not just fear of dying), recurrent suicidal ideation without a specific plan, or a suicide attempt or a specific plan for committing suicide.   B) The symptoms cause clinically significant distress or impairment in social, occupational, or other important areas of functioning  C) The episode is not attributable to the physiological effects of a substance or to another medical condition  D) The occurence of major depressive episode is not better explained by other thought / psychotic disorders  E) There has never been a manic episode or hypomanic episode        DSM5 Diagnoses: (Sustained by DSM5 Criteria Listed Above)  Diagnoses: 296.32 Major Depressive Disorder, Recurrent Episode, Moderate _ and With anxious distress  Psychosocial & Contextual Factors: physical limitations due to stroke, loss of employment, loss of activities he used to enjoy before the stroke, often feels isolated, lack of appetite.   WHODAS 2.0 (12 item)            This questionnaire asks about difficulties due to health conditions. Health conditions  include  disease or illnesses, other health problems that may be short or long lasting,  injuries, mental health or emotional problems, and problems with alcohol or drugs.                     Think back over the past 30 days and answer these questions, thinking about how much  difficulty you had doing the following activities. For each question, please Cachil DeHe only  one response.    S1 Standing for long periods such as 30 minutes? Severe =       4   S2 Taking care of household responsibilities? Severe =       4   S3 Learning a new task, for example, learning how to get to a new  place? None =         1   S4 How much of a problem do you have joining community activities (for example, festivals, [employer] or other activities) in the same way as anyone else can? Severe =       4   S5 How much have you been emotionally affected by your health problems? Severe =       4     In the past 30 days, how much difficulty did you have in:   S6 Concentrating on doing something for ten minutes? None =         1   S7 Walking a long distance such as a kilometer (or equivalent)? Severe =       4   S8 Washing your whole body? Moderate =   3   S9 Getting dressed? Mild =           2   S10 Dealing with people you do not know? None =         1   S11 Maintaining a friendship? None =         1   S12 Your day to day work? Severe =       4     H1 Overall, in the past 30 days, how many days were these difficulties present? Record number of days 30   H2 In the past 30 days, for how many days were you totally unable to carry out your usual activities or work because of any health condition? Record number of days  30   H3 In the past 30 days, not counting the days that you were totally unable, for how many days did you cut back or reduce your usual activities or work because of any health condition? Record number of days 30       Collateral Reports Completed:  Routed note to PCP      PLAN: (Homework, other):  Client stated that he may follow up for ongoing services with [facility].  Scheduled follow-up appointments.       NILA Posadas

## 2017-05-03 ASSESSMENT — ANXIETY QUESTIONNAIRES: GAD7 TOTAL SCORE: 6

## 2017-05-03 ASSESSMENT — PATIENT HEALTH QUESTIONNAIRE - PHQ9: SUM OF ALL RESPONSES TO PHQ QUESTIONS 1-9: 13

## 2017-05-16 ENCOUNTER — OFFICE VISIT (OUTPATIENT)
Dept: PSYCHOLOGY | Facility: CLINIC | Age: 52
End: 2017-05-16
Payer: COMMERCIAL

## 2017-05-16 DIAGNOSIS — F32.1 MAJOR DEPRESSIVE DISORDER, SINGLE EPISODE, MODERATE WITH ANXIOUS DISTRESS (H): Primary | ICD-10-CM

## 2017-05-16 PROCEDURE — 90791 PSYCH DIAGNOSTIC EVALUATION: CPT | Performed by: SOCIAL WORKER
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
## 2017-05-16 NOTE — MR AVS SNAPSHOT
MRN:[id number]                      After Visit Summary   5/16/2017    Maldonado Mendiola    MRN: [id number]           Visit Information        Provider Department      5/16/2017 8:30 AM Eliezer Fox [facility] Generic      Your next 10 appointments already scheduled     May 17, 2017  9:20 AM CDT   (Arrive by 9:05 AM)   Return Visit with Miguel Beauchamp MD   [facility] Physical Medicine and Rehabilitation ([facility])    [address]  [address] [facility] [place] [address]   [phone]            May 23, 2017  8:30 AM CDT   Return Visit with Eliezer Fox [facility] ([facility])    [address] [address]   [phone]            May 31, 2017  8:30 AM CDT   Return Visit with Eliezer Fox [facility] ([facility])    [address] [facility] [address]   [phone]            Jun 06, 2017  8:30 AM CDT   Return Visit with Eliezer Fox [facility] ([facility])    [address] [facility] [address]   [phone]            Jun 19, 2017 10:00 AM CDT   New Sleep Patient with Sumeet Wheatley MD   [facility] [place], Sleep Study ([facility])    [address] [place] [address]   [phone]            Jul 19, 2017  8:30 AM CDT   (Arrive by 8:15 AM)   MR NECK W CONTRAST ANGIOGRAM with [facility] MRI ([facility])    [address]  [address] [facility] [place] [address] [phone]           Take your medicines as usual, unless your doctor tells you not to. Bring a list of your current medicines to your exam (including vitamins, minerals and over-the-counter drugs).  You will be given intravenous contrast for  this exam. To prepare:   The day before your exam, drink extra fluids at least six 8-ounce glasses (unless your doctor tells you to restrict your fluids).   Have a blood test (creatinine test) within 30 days of your exam. Go to your clinic or Diagnostic Imaging Department for this test.  The MRI machine uses a strong magnet. Please wear clothes without metal (snaps, zippers). A sweatsuit works well, or we may give you a hospital gown.  Please remove any body piercings and hair extensions before you arrive. You will also remove watches, jewelry, hairpins, wallets, dentures, partial dental plates and hearing aids. You may wear contact lenses, and you may be able to wear your rings. We have a safe place to keep your personal items, but it is safer to leave them at home.   **IMPORTANT** THE INSTRUCTIONS BELOW ARE ONLY FOR THOSE PATIENTS WHO HAVE BEEN TOLD THEY WILL RECEIVE SEDATION OR GENERAL ANESTHESIA DURING THEIR MRI PROCEDURE:  IF YOU WILL RECEIVE SEDATION (take medicine to help you relax during your exam):   You must get the medicine from your doctor before you arrive. Bring the medicine to the exam. Do not take it at home.   Arrive one hour early. Bring someone who can take you home after the test. Your medicine will make you sleepy. After the exam, you may not drive, take a bus or take a taxi by yourself.   No eating 8 hours before your exam. You may have clear liquids up until 4 hours before your exam. (Clear liquids include water, clear tea, black coffee and fruit juice without pulp.)  IF YOU WILL RECEIVE ANESTHESIA (be asleep for your exam):   Arrive 1 1/2 hours early. Bring someone who can take you home after the test. You may not drive, take a bus or take a taxi by yourself.   No eating 8 hours before your exam. You may have clear liquids up until 4 hours before your exam. (Clear liquids include water, clear tea, black coffee and fruit juice without pulp.)  Please call the Imaging Department at your exam  site with any questions.            Jul 19, 2017  9:15 AM CDT   (Arrive by 9:00 AM)   MR HEAD W/O CONTRAST ANGIOGRAM with [id number]   [facility] ([facility])    [address] [address] [phone]           Take your medicines as usual, unless your doctor tells you not to. Bring a list of your current medicines to your exam (including vitamins, minerals and over-the-counter drugs). Also bring the results of similar scans you may have had.  Please remove any body piercings and hair extensions before you arrive.  Follow your doctor s orders. If you do not, we may have to postpone your exam.  You will not have contrast for this exam. You do not need to do anything special to prepare.  The MRI machine uses a strong magnet. Please wear clothes without metal (snaps, zippers). A sweatsuit works well, or we may give you a hospital gown.   **IMPORTANT** THE INSTRUCTIONS BELOW ARE ONLY FOR THOSE PATIENTS WHO HAVE BEEN TOLD THEY WILL RECEIVE SEDATION OR GENERAL ANESTHESIA DURING THEIR MRI PROCEDURE:  IF YOU WILL RECEIVE SEDATION (take medicine to help you relax during your exam):   You must get the medicine from your doctor before you arrive. Bring the medicine to the exam. Do not take it at home.   Arrive one hour early. Bring someone who can take you home after the test. Your medicine will make you sleepy. After the exam, you may not drive, take a bus or take a taxi by yourself.   No eating 8 hours before your exam. You may have clear liquids up until 4 hours before your exam. (Clear liquids include water, clear tea, black coffee and fruit juice without pulp.)  IF YOU WILL RECEIVE ANESTHESIA (be asleep for your exam):   Arrive 1 1/2 hours early. Bring someone who can take you home after the test. You may not drive, take a bus or take a taxi by yourself.   No eating 8 hours before your exam. You may have clear liquids up until 4 hours before your  exam. (Clear liquids include water, clear tea, black coffee and fruit juice without pulp.)   You will spend four to five hours in the recovery room.  Please call the Imaging Department at your exam site with any questions.            Jul 19, 2017 10:00 AM CDT   (Arrive by 9:45 AM)   MR BRAIN W/O & W CONTRAST with [address] MRI ([facility])    [address] [address] [phone]           Take your medicines as usual, unless your doctor tells you not to. Bring a list of your current medicines to your exam (including vitamins, minerals and over-the-counter drugs).  You will be given intravenous contrast for this exam. To prepare:   The day before your exam, drink extra fluids at least six 8-ounce glasses (unless your doctor tells you to restrict your fluids).   Have a blood test (creatinine test) within 30 days of your exam. Go to your clinic or Diagnostic Imaging Department for this test.  The MRI machine uses a strong magnet. Please wear clothes without metal (snaps, zippers). A sweatsuit works well, or we may give you a hospital gown.  Please remove any body piercings and hair extensions before you arrive. You will also remove watches, jewelry, hairpins, wallets, dentures, partial dental plates and hearing aids. You may wear contact lenses, and you may be able to wear your rings. We have a safe place to keep your personal items, but it is safer to leave them at home.   **IMPORTANT** THE INSTRUCTIONS BELOW ARE ONLY FOR THOSE PATIENTS WHO HAVE BEEN TOLD THEY WILL RECEIVE SEDATION OR GENERAL ANESTHESIA DURING THEIR MRI PROCEDURE:  IF YOU WILL RECEIVE SEDATION (take medicine to help you relax during your exam):   You must get the medicine from your doctor before you arrive. Bring the medicine to the exam. Do not take it at home.   Arrive one hour early. Bring someone who can take you home after the test. Your medicine will make you  sleepy. After the exam, you may not drive, take a bus or take a taxi by yourself.   No eating 8 hours before your exam. You may have clear liquids up until 4 hours before your exam. (Clear liquids include water, clear tea, black coffee and fruit juice without pulp.)  IF YOU WILL RECEIVE ANESTHESIA (be asleep for your exam):   Arrive 1 1/2 hours early. Bring someone who can take you home after the test. You may not drive, take a bus or take a taxi by yourself.   No eating 8 hours before your exam. You may have clear liquids up until 4 hours before your exam. (Clear liquids include water, clear tea, black coffee and fruit juice without pulp.)  Please call the Imaging Department at your exam site with any questions.            Jul 25, 2017  9:00 AM CDT   (Arrive by 8:45 AM)   Return Stroke with Franklin Hidalgo MD   [facility] Neurology ([facility])    [address] [place] [address] [phone]              [employer] [facility] [employer] Information     [employer] gives you secure access to your electronic health record. If you see a primary care provider, you can also send messages to your care team and make appointments. If you have questions, please call your primary care clinic.  If you do not have a primary care provider, please call [phone] and they will assist you.        Care EveryWhere ID     This is your Care EveryWhere ID. This could be used by other organizations to access your [facility] medical records  [id number]

## 2017-05-16 NOTE — Clinical Note
Jann Lewis-I saw Mac today and completed his DA.  Let me know if you have any questions.  Best, Eliezer

## 2017-05-17 ENCOUNTER — OFFICE VISIT (OUTPATIENT)
Dept: PHYSICAL MEDICINE AND REHAB | Facility: CLINIC | Age: 52
End: 2017-05-17

## 2017-05-17 VITALS — DIASTOLIC BLOOD PRESSURE: 68 MMHG | SYSTOLIC BLOOD PRESSURE: 134 MMHG | HEIGHT: 72 IN | HEART RATE: 89 BPM

## 2017-05-17 DIAGNOSIS — G81.91 RIGHT HEMIPARESIS (H): ICD-10-CM

## 2017-05-17 DIAGNOSIS — I61.9 HEMORRHAGIC STROKE (H): Primary | ICD-10-CM

## 2017-05-17 ASSESSMENT — PAIN SCALES - GENERAL: PAINLEVEL: NO PAIN (0)

## 2017-05-17 NOTE — MR AVS SNAPSHOT
After Visit Summary   5/17/2017    Maldonado Mendiola    MRN: [id number]           Patient Information     Date Of Birth          1965        Visit Information        Provider Department      5/17/2017 9:20 AM Miguel Beauchamp MD [facility] Physical Medicine and Rehabilitation        Today's Diagnoses     Hemorrhagic stroke (H)    -  1       Follow-ups after your visit        Your next 10 appointments already scheduled     May 23, 2017  8:30 AM CDT   Return Visit with Eliezer Fox [facility] ([facility])    [address] [facility] [address]   [phone]            May 31, 2017  8:30 AM CDT   Return Visit with Eliezer Fox [facility] ([facility])    [address] [facility] [address]   [phone]            Jun 06, 2017  8:30 AM CDT   Return Visit with Eliezer Montillaryan [facility] ([facility])    [address] [facility] [address]   [phone]            Jun 19, 2017 10:00 AM CDT   New Sleep Patient with Sumeet Wheatley MD   [facility] [place], Sleep Study ([facility])    [address] [place] [id number] [phone]            Jul 19, 2017  8:30 AM CDT   (Arrive by 8:15 AM)   MR NECK W CONTRAST ANGIOGRAM with [address] [facility] MRI ([facility])    [address] [id number] [phone]           Take your medicines as usual, unless your doctor tells you not to. Bring a list of your current medicines to your exam (including vitamins, minerals and over-the-counter drugs).  You will be given intravenous contrast for this exam. To prepare:   The day before your exam, drink extra fluids at least six 8-ounce glasses (unless your doctor tells you to restrict your fluids).    Have a blood test (creatinine test) within 30 days of your exam. Go to your clinic or Diagnostic Imaging Department for this test.  The MRI machine uses a strong magnet. Please wear clothes without metal (snaps, zippers). A sweatsuit works well, or we may give you a hospital gown.  Please remove any body piercings and hair extensions before you arrive. You will also remove watches, jewelry, hairpins, wallets, dentures, partial dental plates and hearing aids. You may wear contact lenses, and you may be able to wear your rings. We have a safe place to keep your personal items, but it is safer to leave them at home.   **IMPORTANT** THE INSTRUCTIONS BELOW ARE ONLY FOR THOSE PATIENTS WHO HAVE BEEN TOLD THEY WILL RECEIVE SEDATION OR GENERAL ANESTHESIA DURING THEIR MRI PROCEDURE:  IF YOU WILL RECEIVE SEDATION (take medicine to help you relax during your exam):   You must get the medicine from your doctor before you arrive. Bring the medicine to the exam. Do not take it at home.   Arrive one hour early. Bring someone who can take you home after the test. Your medicine will make you sleepy. After the exam, you may not drive, take a bus or take a taxi by yourself.   No eating 8 hours before your exam. You may have clear liquids up until 4 hours before your exam. (Clear liquids include water, clear tea, black coffee and fruit juice without pulp.)  IF YOU WILL RECEIVE ANESTHESIA (be asleep for your exam):   Arrive 1 1/2 hours early. Bring someone who can take you home after the test. You may not drive, take a bus or take a taxi by yourself.   No eating 8 hours before your exam. You may have clear liquids up until 4 hours before your exam. (Clear liquids include water, clear tea, black coffee and fruit juice without pulp.)  Please call the Imaging Department at your exam site with any questions.            Jul 19, 2017  9:15 AM CDT   (Arrive by 9:00 AM)   MR HEAD W/O CONTRAST ANGIOGRAM with [address] MRI  ([facility] [place])    [address] [facility]  1st Floor  [facility] [address] [phone]           Take your medicines as usual, unless your doctor tells you not to. Bring a list of your current medicines to your exam (including vitamins, minerals and over-the-counter drugs). Also bring the results of similar scans you may have had.  Please remove any body piercings and hair extensions before you arrive.  Follow your doctor s orders. If you do not, we may have to postpone your exam.  You will not have contrast for this exam. You do not need to do anything special to prepare.  The MRI machine uses a strong magnet. Please wear clothes without metal (snaps, zippers). A sweatsuit works well, or we may give you a hospital gown.   **IMPORTANT** THE INSTRUCTIONS BELOW ARE ONLY FOR THOSE PATIENTS WHO HAVE BEEN TOLD THEY WILL RECEIVE SEDATION OR GENERAL ANESTHESIA DURING THEIR MRI PROCEDURE:  IF YOU WILL RECEIVE SEDATION (take medicine to help you relax during your exam):   You must get the medicine from your doctor before you arrive. Bring the medicine to the exam. Do not take it at home.   Arrive one hour early. Bring someone who can take you home after the test. Your medicine will make you sleepy. After the exam, you may not drive, take a bus or take a taxi by yourself.   No eating 8 hours before your exam. You may have clear liquids up until 4 hours before your exam. (Clear liquids include water, clear tea, black coffee and fruit juice without pulp.)  IF YOU WILL RECEIVE ANESTHESIA (be asleep for your exam):   Arrive 1 1/2 hours early. Bring someone who can take you home after the test. You may not drive, take a bus or take a taxi by yourself.   No eating 8 hours before your exam. You may have clear liquids up until 4 hours before your exam. (Clear liquids include water, clear tea, black coffee and fruit juice without pulp.)   You will spend four to five hours in the recovery room.  Please call  the Imaging Department at your exam site with any questions.            Jul 19, 2017 10:00 AM CDT   (Arrive by 9:45 AM)   MR BRAIN W/O & W CONTRAST with UC1   [facility] ([facility] [place])    [address] [id number] [phone]           Take your medicines as usual, unless your doctor tells you not to. Bring a list of your current medicines to your exam (including vitamins, minerals and over-the-counter drugs).  You will be given intravenous contrast for this exam. To prepare:   The day before your exam, drink extra fluids at least six 8-ounce glasses (unless your doctor tells you to restrict your fluids).   Have a blood test (creatinine test) within 30 days of your exam. Go to your clinic or Diagnostic Imaging Department for this test.  The MRI machine uses a strong magnet. Please wear clothes without metal (snaps, zippers). A sweatsuit works well, or we may give you a hospital gown.  Please remove any body piercings and hair extensions before you arrive. You will also remove watches, jewelry, hairpins, wallets, dentures, partial dental plates and hearing aids. You may wear contact lenses, and you may be able to wear your rings. We have a safe place to keep your personal items, but it is safer to leave them at home.   **IMPORTANT** THE INSTRUCTIONS BELOW ARE ONLY FOR THOSE PATIENTS WHO HAVE BEEN TOLD THEY WILL RECEIVE SEDATION OR GENERAL ANESTHESIA DURING THEIR MRI PROCEDURE:  IF YOU WILL RECEIVE SEDATION (take medicine to help you relax during your exam):   You must get the medicine from your doctor before you arrive. Bring the medicine to the exam. Do not take it at home.   Arrive one hour early. Bring someone who can take you home after the test. Your medicine will make you sleepy. After the exam, you may not drive, take a bus or take a taxi by yourself.   No eating 8 hours before your exam. You may have clear liquids up until 4 hours  before your exam. (Clear liquids include water, clear tea, black coffee and fruit juice without pulp.)  IF YOU WILL RECEIVE ANESTHESIA (be asleep for your exam):   Arrive 1 1/2 hours early. Bring someone who can take you home after the test. You may not drive, take a bus or take a taxi by yourself.   No eating 8 hours before your exam. You may have clear liquids up until 4 hours before your exam. (Clear liquids include water, clear tea, black coffee and fruit juice without pulp.)  Please call the Imaging Department at your exam site with any questions.            Jul 25, 2017  9:00 AM CDT   (Arrive by 8:45 AM)   Return Stroke with Franklin Hidalgo MD   [facility] ([facility])    [address] [place] [address] [phone]            Aug 23, 2017  8:00 AM CDT   (Arrive by 7:45 AM)   Return Visit with Miguel Beauchamp MD   [facility] ([facility])    [address] [place] [address] [phone]              Who to contact     Please call your clinic at [phone] to:    Ask questions about your health    Make or cancel appointments    Discuss your medicines    Learn about your test results    Speak to your doctor   If you have compliments or concerns about an experience at your clinic, or if you wish to file a complaint, please contact [facility] Patient Relations at [phone] or email us at [email or web address]         Additional Information About Your Visit        Oncodesignhart Information     [employer] gives you secure access to your electronic health record. If you see a primary care provider, you can also send messages to your care team and make appointments. If you have questions, please call your primary care clinic.  If you do not have a primary care provider, please call [phone] and they will assist you.       [employer] is an electronic gateway that provides easy, online access to your medical records. With [employer], you can request a clinic appointment, read your test results, renew a prescription or communicate with your care team.     To access your existing account, please contact your [facility] or call [phone] for assistance.        Care EveryWhere ID     This is your Care EveryWhere ID. This could be used by other organizations to access your [facility] medical records  [id number]        Your Vitals Were     Pulse Height                89 1.829 m (6')           Blood Pressure from Last 3 Encounters:   05/17/17 134/68   03/27/17 110/72   03/15/17 107/73    Weight from Last 3 Encounters:   03/27/17 73 kg (161 lb)   03/15/17 74.3 kg (163 lb 11.2 oz)   02/28/17 76.7 kg (169 lb)              Today, you had the following     No orders found for display       Primary Care Provider Office Phone # Fax #    Edmond Cano PA-C [phone]       [place] [phone]        Thank you!     Thank you for choosing [facility] PHYSICAL MEDICINE AND REHABILITATION  for your care. Our goal is always to provide you with excellent care. Hearing back from our patients is one way we can continue to improve our services. Please take a few minutes to complete the written survey that you may receive in the mail after your visit with us. Thank you!             Your Updated Medication List - Protect others around you: Learn how to safely use, store and throw away your medicines at www.disposemymeds.org.          This list is accurate as of: 5/17/17 10:28 AM.  Always use your most recent med list.                   Brand Name Dispense Instructions for use    hydrOXYzine 25 MG tablet    ATARAX    60 tablet    1 to 2 tablets every 6 hours as needed for panic/anxiety       lisinopril 10 MG tablet    PRINIVIL/ZESTRIL    90 tablet    10 mg        propranolol 20 MG tablet    INDERAL    90 tablet    Take 1 tablet (20 mg) by mouth as needed Daily for panic / anxiety       traZODone 50 MG tablet    DESYREL    180 tablet    1 to 3 tablets, 30 minutes before bedtime

## 2017-05-17 NOTE — LETTER
"5/17/2017       RE: Maldonado Mendiola  [address]  [facility] [address]     Dear Colleague,    Thank you for referring your patient, Maldonado Mendiola, to the [facility] PHYSICAL MEDICINE AND REHABILITATION at [facility]. Please see a copy of my visit note below.    Rehabilitation Medicine Clinic    HISTORY OF PRESENT ILLNESS:  Mac is a 52-year-old gentleman who follow up having last seen me 3/15/17. History of left basal ganglia hemorrhagic stroke in January.  He transitioned his outpatient therapy to [facility] where he continues. reports almost done with OT, continues pool and PT. About to start Stayfit program. Has been working with e-stim to the right arm and reports some improvements.  At last visit with me he had some slight movement in hand and arm but wasn't able to incorporate into any ADL tasks. Now has been able to do some with , holding objects, opening doors, putting silverware in drawer and tieing shoe. Still very slow process though he's persistent and using these tasks as therapy. He has even done some strumming with right hand on guitar.  His walking is also improving. Still some asymmetry and uses a right posterior leaf AFO but no other AD in most situations.  He was having pain at right shoulder that has resolved. Was associated with some inferior subluxation which is mostly improved. Occasionally will \"pop\" with some exercises.  He has connected with Dr. Caballero exploring rTMS therapy. He's very interested and looking at this as a next step possibly June or July.      PHYSICAL EXAMINATION: /68  Pulse 89  Ht 6' (1.829 m)   Mac is fully alert, and excellent historian. His wife Shannan is present and supportive.   Fluent speech and language.  Walks with a reciprocal gait pattern, with reduced swing on the right though clearing on toe and no LOB. Flexing knee better through stance.  Right arm with improved movement, abduct the shoulder actively about " 100 degrees and flex his elbow to mouth. Able to open fingers but not fully straight. Some  4/5 and some early finger individuation.  There is mild swelling through the hand.  Trace shoulder subluxation.     ASSESSMENT AND RECOMMENDATIONS:   1. Mac is about 5 months out from hemorrhagic stroke and has shown definite gains since last visit with me. 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  He has subsequent impaired activities of daily living and mobility.   2. Reviewed overall neuroplasticity and recovery timeframes.  I anticipate he'll have some long-term weakness and dysfunction in the right arm though incrementally less than before.  3. I continue to endorse his use of neuromuscular electrical stimulation to address some of the muscle disuse atrophy and facilitation of recovery.    4. He is continuing with some outpatient pool and land therapy with plans to transition to stayfit program.  5. Conversation on adjustment to disability and focus on quality of life aspects and different accommodations. He seems in a better place and some healthy balance of acceping what may be but still striving to as much as possible in underlying neuro recovery.  6. Mac would likely be an excellent candidate for rTMS and will continue to explore. Discussed concepts with affected side under activity though also the non-stroke side may produce some inhibition and osme of the rTMS is activiating and some suppressive.  7. I would like to follow up with Mac in Rehabilitation Clinic in about 3 months' time.  He will contact us sooner if functional rehab issues arise.      Fifty minutes spent in direct patient interaction, greater than 50% counseling and education of the above-detailed items.     Again, thank you for allowing me to participate in the care of your patient.      Sincerely,  Miguel Beauchamp MD

## 2017-05-17 NOTE — NURSING NOTE
Chief Complaint   Patient presents with     Consult     NEW CVA DC REHAB    Erendira Thomas CMA

## 2017-05-21 NOTE — PROGRESS NOTES
"Rehabilitation Medicine Clinic    HISTORY OF PRESENT ILLNESS:  Mac is a 52-year-old gentleman who follow up having last seen me 3/15/17. History of left basal ganglia hemorrhagic stroke in January.  He transitioned his outpatient therapy to [facility] where he continues. reports almost done with OT, continues pool and PT. About to start Stayfit program. Has been working with e-stim to the right arm and reports some improvements.  At last visit with me he had some slight movement in hand and arm but wasn't able to incorporate into any ADL tasks. Now has been able to do some with , holding objects, opening doors, putting silverware in drawer and tieing shoe. Still very slow process though he's persistent and using these tasks as therapy. He has even done some strumming with right hand on guitar.  His walking is also improving. Still some asymmetry and uses a right posterior leaf AFO but no other AD in most situations.  He was having pain at right shoulder that has resolved. Was associated with some inferior subluxation which is mostly improved. Occasionally will \"pop\" with some exercises.  He has connected with Dr. Caballero exploring rTMS therapy. He's very interested and looking at this as a next step possibly June or July.      PHYSICAL EXAMINATION: /68  Pulse 89  Ht 6' (1.829 m)   Mac is fully alert, and excellent historian. His wife Shannan is present and supportive.   Fluent speech and language.  Walks with a reciprocal gait pattern, with reduced swing on the right though clearing on toe and no LOB. Flexing knee better through stance.  Right arm with improved movement, abduct the shoulder actively about 100 degrees and flex his elbow to mouth. Able to open fingers but not fully straight. Some  4/5 and some early finger individuation.  There is mild swelling through the hand.  Trace shoulder subluxation.     ASSESSMENT AND RECOMMENDATIONS:   1. Mac is about 5 months out from hemorrhagic stroke " and has shown definite gains since last visit with me. 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  He has subsequent impaired activities of daily living and mobility.   2. Reviewed overall neuroplasticity and recovery timeframes.  I anticipate he'll have some long-term weakness and dysfunction in the right arm though incrementally less than before.  3. I continue to endorse his use of neuromuscular electrical stimulation to address some of the muscle disuse atrophy and facilitation of recovery.    4. He is continuing with some outpatient pool and land therapy [facility]  5. Conversation on adjustment to disability and focus on quality of life aspects and different accommodations. He seems in a better place and some healthy balance of acceping what may be but still striving to as much as possible in underlying neuro recovery.  6. Mac would likely be an excellent candidate for rTMS and will continue to explore. Discussed concepts with affected side under activity though also the non-stroke side may produce some inhibition and osme of the rTMS is activiating and some suppressive.  7. I would like to follow up with Mac in Rehabilitation Clinic in about 3 months' time.  He will contact us sooner if functional rehab issues arise.      Fifty minutes spent in direct patient interaction, greater than 50% counseling and education of the above-detailed items.

## 2017-05-23 ENCOUNTER — OFFICE VISIT (OUTPATIENT)
Dept: PSYCHOLOGY | Facility: CLINIC | Age: 52
End: 2017-05-23
Payer: COMMERCIAL

## 2017-05-23 DIAGNOSIS — F32.1 MAJOR DEPRESSIVE DISORDER, SINGLE EPISODE, MODERATE WITH ANXIOUS DISTRESS (H): Primary | ICD-10-CM

## 2017-05-23 PROCEDURE — 90834 PSYTX W PT 45 MINUTES: CPT | Performed by: SOCIAL WORKER

## 2017-05-23 NOTE — PROGRESS NOTES
Progress Note    Client Name: Maldonado Mendiola  Date:5/23/17          Service Type: Individual      Session Start Time: 8;30  Session End Time: 9:15      Session Length: 45     Session #: 3     Attendees: Client    Treatment Plan Last Reviewed: started tx plan today 5-23-17  PHQ-9 / NICOLE-7 : Complete next session     DATA      Progress Since Last Session (Related to Symptoms / Goals / Homework):   Symptoms: Stable    Homework: Did not complete      Episode of Care Goals: Minimal progress - CONTEMPLATION (Considering change and yet undecided); Intervened by assessing the negative and positive thinking (ambivalence) about behavior change     Current / Ongoing Stressors and Concerns:   Recovery from a stroke, low self esteem, on disability and unable to work, marriage relationship issues,      Treatment Objective(s) Addressed in This Session:   use positive self-affirmations daily  Concentrate on strengths and self esteem     Intervention:   Self esteem transfusion schedule        ASSESSMENT: Current Emotional / Mental Status (status of significant symptoms):   Risk status (Self / Other harm or suicidal ideation)   Client denies current fears or concerns for personal safety.   Client denies current or recent suicidal ideation or behaviors.   Client denies current or recent homicidal ideation or behaviors.   Client denies current or recent self injurious behavior or ideation.   Client denies other safety concerns.   A safety and risk management plan has not been developed at this time, however client was given the after-hours number / 911 should there be a change in any of these risk factors.     Appearance:   Appropriate    Eye Contact:   Good    Psychomotor Behavior: Restless    Attitude:   Cooperative    Orientation:   All   Speech    Rate / Production: Normal     Volume:  Soft    Mood:    Anxious  Depressed  Sad    Affect:    Blunted  Subdued  Worrisome    Thought  Content:  Rumination    Thought Form:  Coherent  Goal Directed    Insight:    Fair      Medication Review:   No changes to current psychiatric medication(s)     Medication Compliance:   No     Changes in Health Issues:   None reported     Chemical Use Review:   Substance Use: Chemical use reviewed, no active concerns identified      Tobacco Use: No current tobacco use.       Collateral Reports Completed:   Not Applicable    PLAN: (Client Tasks / Therapist Tasks / Other)  Client discussed goals that he wanted to work on for the future.  Client was given self esteem transfusion schedule and he is to complete daily and bring into next session to discuss low scored items.         Eliezer Fox, [facility]                                                         ________________________________________________________________________    Treatment Plan    Client's Name: Maldonado Mendiola  YOB: 1965    Date: 5/23/17    DSM-V Diagnoses: 296.22 Major Depressive Disorder, Single Episode, Moderate _ and With anxious distress  Psychosocial / Contextual Factors: recovering from a stroke, on disability and unable to work, marital relationship issues, low self esteem,   WHODAS: Completed first session    Referral / Collaboration:  Referral to another professional/service is not indicated at this time..    Anticipated number of session or this episode of care: 20      MeasurableTreatment Goal(s) related to diagnosis / functional impairment(s)  Goal 1: Client will decrease depressive symptoms and return to normal daily functioning.     I will know I've met my goal when I am happy again and can find peace and gustavo in my life.      Objective #A (Client Action)    Client will Decrease frequency and intensity of feeling down, depressed, hopeless.  Status: Continued - Date(s): 5/23/17    Intervention(s)  Therapist will assign homework Concentrate on positive activities that he can engage in that will improve his mood.  Pick  three things distraction activities that will improve his mood daily..    Objective #B  Client will improve his overall self esteem .  Status: Continued - Date(s): 5/23/17    Intervention(s)  Therapist will assign homework Complete self esteem transfusion schedule daily and bring into session to discuss low scored items..    Objective #C  Client will Identify negative self-talk and behaviors: challenge core beliefs, myths, and actions.  Status: Continued - Date(s):5/23/17     Intervention(s)  Therapist will teach thought stopping process and practice daily so that he can concentrate on what he can do instead of what he can not and improve overall mood.       Goal 2: Client will decrease overall anxiety and return to normal daily functioning.    I will know I've met my goal when I feel less anxiety about my condition and feel better about self.      Objective #A (Client Action)    Status: Continued - Date(s):5/23/17     Client will use relaxation strategies 2 times per day to reduce the physical symptoms of anxiety.    Intervention(s)  Therapist will teach relaxation and mindfulness activities daily to alleviate anxiety.   .    Objective #B  Client will use cognitive strategies identified in therapy to challenge anxious thoughts.    Status: Continued - Date(s): 5/23/17    Intervention(s)  Therapist will assign homework Complete mood log to learn CBT skills and utilize daily or as needed .    Objective #C  Client will use  worry time  each day for 15 minutes of scheduled worry and then defer obsessive or anxious thinking until the next structured worry time.  Status: Continued - Date(s): 5/23/17    Intervention(s)  Therapist will teach how to decrease negative worry and limit time daily on things that create and produce worry and anxiety.        Client has reviewed and agreed to the above plan.      Eliezer Fox, [facility]  May 23, 2017

## 2017-05-23 NOTE — MR AVS SNAPSHOT
MRN:[id number]                      After Visit Summary   5/23/2017    Maldonado Mendiola    MRN: [id number]           Visit Information        Provider Department      5/23/2017 8:30 AM Eliezer Fox [facility]      Your next 10 appointments already scheduled     May 31, 2017  8:30 AM CDT   Return Visit with Eliezer Fox [facility] ([facility])    [address] [facility] [address]   [phone]            Jun 06, 2017  8:30 AM CDT   Return Visit with Eliezer Fox [facility] ([facility])    [address] [facility] [address]   [phone]            Jun 13, 2017  8:30 AM CDT   Return Visit with Eliezer Fox [facility] ([facility])    [address] [facility] [address]   [phone]            Jun 19, 2017 10:00 AM CDT   New Sleep Patient with Sumeet Wheatley MD   [facility] [place] Sleep Study ([facility])    [address] [place] [address]   [phone]            Jul 19, 2017  8:30 AM CDT   (Arrive by 8:15 AM)   MR NECK W CONTRAST ANGIOGRAM with [address] [facility] ([facility])    [address] [id number] [phone]           Take your medicines as usual, unless your doctor tells you not to. Bring a list of your current medicines to your exam (including vitamins, minerals and over-the-counter drugs).  You will be given intravenous contrast for this exam. To prepare:   The day before your exam, drink extra fluids at least six 8-ounce glasses (unless your doctor tells you to restrict your fluids).   Have a blood test (creatinine test) within 30 days of your exam. Go to your clinic or Diagnostic  Imaging Department for this test.  The MRI machine uses a strong magnet. Please wear clothes without metal (snaps, zippers). A sweatsuit works well, or we may give you a hospital gown.  Please remove any body piercings and hair extensions before you arrive. You will also remove watches, jewelry, hairpins, wallets, dentures, partial dental plates and hearing aids. You may wear contact lenses, and you may be able to wear your rings. We have a safe place to keep your personal items, but it is safer to leave them at home.   **IMPORTANT** THE INSTRUCTIONS BELOW ARE ONLY FOR THOSE PATIENTS WHO HAVE BEEN TOLD THEY WILL RECEIVE SEDATION OR GENERAL ANESTHESIA DURING THEIR MRI PROCEDURE:  IF YOU WILL RECEIVE SEDATION (take medicine to help you relax during your exam):   You must get the medicine from your doctor before you arrive. Bring the medicine to the exam. Do not take it at home.   Arrive one hour early. Bring someone who can take you home after the test. Your medicine will make you sleepy. After the exam, you may not drive, take a bus or take a taxi by yourself.   No eating 8 hours before your exam. You may have clear liquids up until 4 hours before your exam. (Clear liquids include water, clear tea, black coffee and fruit juice without pulp.)  IF YOU WILL RECEIVE ANESTHESIA (be asleep for your exam):   Arrive 1 1/2 hours early. Bring someone who can take you home after the test. You may not drive, take a bus or take a taxi by yourself.   No eating 8 hours before your exam. You may have clear liquids up until 4 hours before your exam. (Clear liquids include water, clear tea, black coffee and fruit juice without pulp.)  Please call the Imaging Department at your exam site with any questions.            Jul 19, 2017  9:15 AM CDT   (Arrive by 9:00 AM)   MR HEAD W/O CONTRAST ANGIOGRAM with [address] [facility] ([facility])    [address] [address]    [phone]           Take your medicines as usual, unless your doctor tells you not to. Bring a list of your current medicines to your exam (including vitamins, minerals and over-the-counter drugs). Also bring the results of similar scans you may have had.  Please remove any body piercings and hair extensions before you arrive.  Follow your doctor s orders. If you do not, we may have to postpone your exam.  You will not have contrast for this exam. You do not need to do anything special to prepare.  The MRI machine uses a strong magnet. Please wear clothes without metal (snaps, zippers). A sweatsuit works well, or we may give you a hospital gown.   **IMPORTANT** THE INSTRUCTIONS BELOW ARE ONLY FOR THOSE PATIENTS WHO HAVE BEEN TOLD THEY WILL RECEIVE SEDATION OR GENERAL ANESTHESIA DURING THEIR MRI PROCEDURE:  IF YOU WILL RECEIVE SEDATION (take medicine to help you relax during your exam):   You must get the medicine from your doctor before you arrive. Bring the medicine to the exam. Do not take it at home.   Arrive one hour early. Bring someone who can take you home after the test. Your medicine will make you sleepy. After the exam, you may not drive, take a bus or take a taxi by yourself.   No eating 8 hours before your exam. You may have clear liquids up until 4 hours before your exam. (Clear liquids include water, clear tea, black coffee and fruit juice without pulp.)  IF YOU WILL RECEIVE ANESTHESIA (be asleep for your exam):   Arrive 1 1/2 hours early. Bring someone who can take you home after the test. You may not drive, take a bus or take a taxi by yourself.   No eating 8 hours before your exam. You may have clear liquids up until 4 hours before your exam. (Clear liquids include water, clear tea, black coffee and fruit juice without pulp.)   You will spend four to five hours in the recovery room.  Please call the Imaging Department at your exam site with any questions.            Jul 19, 2017 10:00 AM CDT    (Arrive by 9:45 AM)   MR BRAIN W/O & W CONTRAST with [id number]   [facility] ([facility])    [address] [facility]  1st [address]  [facility] [address] [phone]           Take your medicines as usual, unless your doctor tells you not to. Bring a list of your current medicines to your exam (including vitamins, minerals and over-the-counter drugs).  You will be given intravenous contrast for this exam. To prepare:   The day before your exam, drink extra fluids at least six 8-ounce glasses (unless your doctor tells you to restrict your fluids).   Have a blood test (creatinine test) within 30 days of your exam. Go to your clinic or Diagnostic Imaging Department for this test.  The MRI machine uses a strong magnet. Please wear clothes without metal (snaps, zippers). A sweatsuit works well, or we may give you a hospital gown.  Please remove any body piercings and hair extensions before you arrive. You will also remove watches, jewelry, hairpins, wallets, dentures, partial dental plates and hearing aids. You may wear contact lenses, and you may be able to wear your rings. We have a safe place to keep your personal items, but it is safer to leave them at home.   **IMPORTANT** THE INSTRUCTIONS BELOW ARE ONLY FOR THOSE PATIENTS WHO HAVE BEEN TOLD THEY WILL RECEIVE SEDATION OR GENERAL ANESTHESIA DURING THEIR MRI PROCEDURE:  IF YOU WILL RECEIVE SEDATION (take medicine to help you relax during your exam):   You must get the medicine from your doctor before you arrive. Bring the medicine to the exam. Do not take it at home.   Arrive one hour early. Bring someone who can take you home after the test. Your medicine will make you sleepy. After the exam, you may not drive, take a bus or take a taxi by yourself.   No eating 8 hours before your exam. You may have clear liquids up until 4 hours before your exam. (Clear liquids include water, clear tea, black coffee and fruit juice without  pulp.)  IF YOU WILL RECEIVE ANESTHESIA (be asleep for your exam):   Arrive 1 1/2 hours early. Bring someone who can take you home after the test. You may not drive, take a bus or take a taxi by yourself.   No eating 8 hours before your exam. You may have clear liquids up until 4 hours before your exam. (Clear liquids include water, clear tea, black coffee and fruit juice without pulp.)  Please call the Imaging Department at your exam site with any questions.            Jul 25, 2017  9:00 AM CDT   (Arrive by 8:45 AM)   Return Stroke with Franklin Hidalgo MD   [facility]rology ([facility])    [address] [place] [address] [phone]            Aug 23, 2017  8:00 AM CDT   (Arrive by 7:45 AM)   Return Visit with Miguel Beauchamp MD   [facility] Physical Medicine and Rehabilitation ([facility])    [address] [place] [address] [phone]              [employer] Information     [employer] gives you secure access to your electronic health record. If you see a primary care provider, you can also send messages to your care team and make appointments. If you have questions, please call your primary care clinic.  If you do not have a primary care provider, please call [phone] and they will assist you.        Care EveryWhere ID     This is your Care EveryWhere ID. This could be used by other organizations to access your [facility] medical records  [id number]

## 2017-05-25 ENCOUNTER — CARE COORDINATION (OUTPATIENT)
Dept: PHYSICAL MEDICINE AND REHAB | Facility: CLINIC | Age: 52
End: 2017-05-25

## 2017-05-25 NOTE — PROGRESS NOTES
The Attending Provider Statement was completed and signed by Dr Beauchamp and faxed back to Replaced by [facility] [phone] along with patient's signed release of information and Dr Beauchamp's 5/17/17 PMR visit clinic note. Claim number is [id number].

## 2017-05-31 ENCOUNTER — OFFICE VISIT (OUTPATIENT)
Dept: PSYCHOLOGY | Facility: CLINIC | Age: 52
End: 2017-05-31
Payer: COMMERCIAL

## 2017-05-31 DIAGNOSIS — F32.1 MAJOR DEPRESSIVE DISORDER, SINGLE EPISODE, MODERATE WITH ANXIOUS DISTRESS (H): Primary | ICD-10-CM

## 2017-05-31 PROCEDURE — 90834 PSYTX W PT 45 MINUTES: CPT | Performed by: SOCIAL WORKER

## 2017-05-31 NOTE — PROGRESS NOTES
Progress Note    Client Name: Maldonado Mendiola  Date:5/31/17          Service Type: Individual      Session Start Time: 8;30  Session End Time: 9:15      Session Length: 45     Session #: 4     Attendees: Client    Treatment Plan Last Reviewed: started tx plan today 5-23-17  PHQ-9 / NICOLE-7 : Complete next session     DATA      Progress Since Last Session (Related to Symptoms / Goals / Homework):   Symptoms: Stable    Homework: Achieved / completed to satisfaction      Episode of Care Goals: Achieved / completed to satisfaction - CONTEMPLATION (Considering change and yet undecided); Intervened by assessing the negative and positive thinking (ambivalence) about behavior change     Current / Ongoing Stressors and Concerns:   Recovery from a stroke, low self esteem, on disability and unable to work, marriage relationship issues,      Treatment Objective(s) Addressed in This Session:   use positive self-affirmations daily  Concentrate on strengths and self esteem  CBT skill development   Intervention:   Self esteem transfusion schedule   Mood log     ASSESSMENT: Current Emotional / Mental Status (status of significant symptoms):   Risk status (Self / Other harm or suicidal ideation)   Client denies current fears or concerns for personal safety.   Client denies current or recent suicidal ideation or behaviors.   Client denies current or recent homicidal ideation or behaviors.   Client denies current or recent self injurious behavior or ideation.   Client denies other safety concerns.   A safety and risk management plan has not been developed at this time, however client was given the after-hours number / 911 should there be a change in any of these risk factors.     Appearance:   Appropriate    Eye Contact:   Good    Psychomotor Behavior: Restless    Attitude:   Cooperative    Orientation:   All   Speech    Rate / Production: Normal     Volume:  Soft    Mood:    Anxious   Depressed  Sad    Affect:    Blunted  Subdued  Worrisome    Thought Content:  Rumination    Thought Form:  Coherent  Goal Directed    Insight:    Fair      Medication Review:   No changes to current psychiatric medication(s)     Medication Compliance:   No     Changes in Health Issues:   None reported     Chemical Use Review:   Substance Use: Chemical use reviewed, no active concerns identified      Tobacco Use: No current tobacco use.       Collateral Reports Completed:   Not Applicable    PLAN: (Client Tasks / Therapist Tasks / Other)  Client discussed goals that he wanted to work on for the future.  Client was given self esteem transfusion schedule and he is to complete daily and bring into next session to discuss low scored items.   Discussed areas where he struggled with low scores on the self esteem transfusion schedule.  Concentrated on applying self compassion to core hurts that are triggered in self.  Reviewed the mood log assignment to start developing CBT skills on a daily basis.  He will use when he gets caught in negative thinking and feelings about self.  Also write up a list of distraction activities that he can engage in that will lift his mood and distract from his negative thinking.        Eliezer Fox, [facility]                                                         ________________________________________________________________________    Treatment Plan    Client's Name: Maldonado Mendiola  YOB: 1965    Date: 5/23/17    DSM-V Diagnoses: 296.22 Major Depressive Disorder, Single Episode, Moderate _ and With anxious distress  Psychosocial / Contextual Factors: recovering from a stroke, on disability and unable to work, marital relationship issues, low self esteem,   WHODAS: Completed first session    Referral / Collaboration:  Referral to another professional/service is not indicated at this time..    Anticipated number of session or this episode of care: 20      MeasurableTreatment  Goal(s) related to diagnosis / functional impairment(s)  Goal 1: Client will decrease depressive symptoms and return to normal daily functioning.     I will know I've met my goal when I am happy again and can find peace and gustavo in my life.      Objective #A (Client Action)    Client will Decrease frequency and intensity of feeling down, depressed, hopeless.  Status: Continued - Date(s): 5/23/17    Intervention(s)  Therapist will assign homework Concentrate on positive activities that he can engage in that will improve his mood.  Pick three things distraction activities that will improve his mood daily..    Objective #B  Client will improve his overall self esteem .  Status: Continued - Date(s): 5/23/17    Intervention(s)  Therapist will assign homework Complete self esteem transfusion schedule daily and bring into session to discuss low scored items..    Objective #C  Client will Identify negative self-talk and behaviors: challenge core beliefs, myths, and actions.  Status: Continued - Date(s):5/23/17     Intervention(s)  Therapist will teach thought stopping process and practice daily so that he can concentrate on what he can do instead of what he can not and improve overall mood.       Goal 2: Client will decrease overall anxiety and return to normal daily functioning.    I will know I've met my goal when I feel less anxiety about my condition and feel better about self.      Objective #A (Client Action)    Status: Continued - Date(s):5/23/17     Client will use relaxation strategies 2 times per day to reduce the physical symptoms of anxiety.    Intervention(s)  Therapist will teach relaxation and mindfulness activities daily to alleviate anxiety.   .    Objective #B  Client will use cognitive strategies identified in therapy to challenge anxious thoughts.    Status: Continued - Date(s): 5/23/17    Intervention(s)  Therapist will assign homework Complete mood log to learn CBT skills and utilize daily or as needed  .    Objective #C  Client will use  worry time  each day for 15 minutes of scheduled worry and then defer obsessive or anxious thinking until the next structured worry time.  Status: Continued - Date(s): 5/23/17    Intervention(s)  Therapist will teach how to decrease negative worry and limit time daily on things that create and produce worry and anxiety.        Client has reviewed and agreed to the above plan.      Eliezer Fox, [facility]  May 23, 2017

## 2017-05-31 NOTE — MR AVS SNAPSHOT
MRN:[id number]                      After Visit Summary   5/31/2017    Maldonado Mendiola    MRN: [id number]           Visit Information        Provider Department      5/31/2017 8:30 AM [facility]      Your next 10 appointments already scheduled     Jun 06, 2017  8:30 AM CDT   Return Visit with Eliezer Fox [facility] ([facility])    [address] [facility] [address]   [phone]            Jun 13, 2017  8:30 AM CDT   Return Visit with Eliezer Fox [facility] ([facility])    [address] [address]   [phone]            Jun 19, 2017 10:00 AM CDT   New Sleep Patient with Sumeet Wheatley MD   [facility] [place], Sleep Study ([facility])    [address] [place] [address]   [phone]            Jun 20, 2017  8:30 AM CDT   Return Visit with Eliezer Fox [facility] ([facility])    [address] [facility] [address]   [phone]            Jul 19, 2017  8:30 AM CDT   (Arrive by 8:15 AM)   MR NECK W CONTRAST ANGIOGRAM with [address] [facility] ([facility])    [address] [id number] [phone]           Take your medicines as usual, unless your doctor tells you not to. Bring a list of your current medicines to your exam (including vitamins, minerals and over-the-counter drugs).  You will be given intravenous contrast for this exam. To prepare:   The day before your exam, drink extra fluids at least six 8-ounce glasses (unless your doctor tells you to restrict your fluids).   Have a blood test (creatinine test) within 30 days of your exam. Go to your clinic or Diagnostic  Imaging Department for this test.  The MRI machine uses a strong magnet. Please wear clothes without metal (snaps, zippers). A sweatsuit works well, or we may give you a hospital gown.  Please remove any body piercings and hair extensions before you arrive. You will also remove watches, jewelry, hairpins, wallets, dentures, partial dental plates and hearing aids. You may wear contact lenses, and you may be able to wear your rings. We have a safe place to keep your personal items, but it is safer to leave them at home.   **IMPORTANT** THE INSTRUCTIONS BELOW ARE ONLY FOR THOSE PATIENTS WHO HAVE BEEN TOLD THEY WILL RECEIVE SEDATION OR GENERAL ANESTHESIA DURING THEIR MRI PROCEDURE:  IF YOU WILL RECEIVE SEDATION (take medicine to help you relax during your exam):   You must get the medicine from your doctor before you arrive. Bring the medicine to the exam. Do not take it at home.   Arrive one hour early. Bring someone who can take you home after the test. Your medicine will make you sleepy. After the exam, you may not drive, take a bus or take a taxi by yourself.   No eating 8 hours before your exam. You may have clear liquids up until 4 hours before your exam. (Clear liquids include water, clear tea, black coffee and fruit juice without pulp.)  IF YOU WILL RECEIVE ANESTHESIA (be asleep for your exam):   Arrive 1 1/2 hours early. Bring someone who can take you home after the test. You may not drive, take a bus or take a taxi by yourself.   No eating 8 hours before your exam. You may have clear liquids up until 4 hours before your exam. (Clear liquids include water, clear tea, black coffee and fruit juice without pulp.)  Please call the Imaging Department at your exam site with any questions.            Jul 19, 2017  9:15 AM CDT   (Arrive by 9:00 AM)   MR HEAD W/O CONTRAST ANGIOGRAM with [address] [facility] [place] [facility] ([facility])    [address] [address]    [phone]           Take your medicines as usual, unless your doctor tells you not to. Bring a list of your current medicines to your exam (including vitamins, minerals and over-the-counter drugs). Also bring the results of similar scans you may have had.  Please remove any body piercings and hair extensions before you arrive.  Follow your doctor s orders. If you do not, we may have to postpone your exam.  You will not have contrast for this exam. You do not need to do anything special to prepare.  The MRI machine uses a strong magnet. Please wear clothes without metal (snaps, zippers). A sweatsuit works well, or we may give you a hospital gown.   **IMPORTANT** THE INSTRUCTIONS BELOW ARE ONLY FOR THOSE PATIENTS WHO HAVE BEEN TOLD THEY WILL RECEIVE SEDATION OR GENERAL ANESTHESIA DURING THEIR MRI PROCEDURE:  IF YOU WILL RECEIVE SEDATION (take medicine to help you relax during your exam):   You must get the medicine from your doctor before you arrive. Bring the medicine to the exam. Do not take it at home.   Arrive one hour early. Bring someone who can take you home after the test. Your medicine will make you sleepy. After the exam, you may not drive, take a bus or take a taxi by yourself.   No eating 8 hours before your exam. You may have clear liquids up until 4 hours before your exam. (Clear liquids include water, clear tea, black coffee and fruit juice without pulp.)  IF YOU WILL RECEIVE ANESTHESIA (be asleep for your exam):   Arrive 1 1/2 hours early. Bring someone who can take you home after the test. You may not drive, take a bus or take a taxi by yourself.   No eating 8 hours before your exam. You may have clear liquids up until 4 hours before your exam. (Clear liquids include water, clear tea, black coffee and fruit juice without pulp.)   You will spend four to five hours in the recovery room.  Please call the Imaging Department at your exam site with any questions.            Jul 19, 2017 10:00 AM CDT    (Arrive by 9:45 AM)   MR BRAIN W/O & W CONTRAST with [id number]   [facility] ([facility] [place])    [address] [facility]  1st Floor  [facility] [address] [phone]           Take your medicines as usual, unless your doctor tells you not to. Bring a list of your current medicines to your exam (including vitamins, minerals and over-the-counter drugs).  You will be given intravenous contrast for this exam. To prepare:   The day before your exam, drink extra fluids at least six 8-ounce glasses (unless your doctor tells you to restrict your fluids).   Have a blood test (creatinine test) within 30 days of your exam. Go to your clinic or Diagnostic Imaging Department for this test.  The MRI machine uses a strong magnet. Please wear clothes without metal (snaps, zippers). A sweatsuit works well, or we may give you a hospital gown.  Please remove any body piercings and hair extensions before you arrive. You will also remove watches, jewelry, hairpins, wallets, dentures, partial dental plates and hearing aids. You may wear contact lenses, and you may be able to wear your rings. We have a safe place to keep your personal items, but it is safer to leave them at home.   **IMPORTANT** THE INSTRUCTIONS BELOW ARE ONLY FOR THOSE PATIENTS WHO HAVE BEEN TOLD THEY WILL RECEIVE SEDATION OR GENERAL ANESTHESIA DURING THEIR MRI PROCEDURE:  IF YOU WILL RECEIVE SEDATION (take medicine to help you relax during your exam):   You must get the medicine from your doctor before you arrive. Bring the medicine to the exam. Do not take it at home.   Arrive one hour early. Bring someone who can take you home after the test. Your medicine will make you sleepy. After the exam, you may not drive, take a bus or take a taxi by yourself.   No eating 8 hours before your exam. You may have clear liquids up until 4 hours before your exam. (Clear liquids include water, clear tea, black coffee and fruit juice without  pulp.)  IF YOU WILL RECEIVE ANESTHESIA (be asleep for your exam):   Arrive 1 1/2 hours early. Bring someone who can take you home after the test. You may not drive, take a bus or take a taxi by yourself.   No eating 8 hours before your exam. You may have clear liquids up until 4 hours before your exam. (Clear liquids include water, clear tea, black coffee and fruit juice without pulp.)  Please call the Imaging Department at your exam site with any questions.            Jul 25, 2017  9:00 AM CDT   (Arrive by 8:45 AM)   Return Stroke with Franklin Hidalgo MD   [facility] Neurology ([facility])    [address] [place] [address] [phone]            Aug 23, 2017  8:00 AM CDT   (Arrive by 7:45 AM)   Return Visit with Miguel Beauchamp MD   [facility] Physical Medicine and Rehabilitation ([facility])    [address] [place] [address] [phone]              [email or web address] Information     [employer] gives you secure access to your electronic health record. If you see a primary care provider, you can also send messages to your care team and make appointments. If you have questions, please call your primary care clinic.  If you do not have a primary care provider, please call [phone] and they will assist you.        Care EveryWhere ID     This is your Care EveryWhere ID. This could be used by other organizations to access your [place] medical records  [id number]

## 2017-06-06 ENCOUNTER — OFFICE VISIT (OUTPATIENT)
Dept: PSYCHOLOGY | Facility: CLINIC | Age: 52
End: 2017-06-06
Payer: COMMERCIAL

## 2017-06-06 DIAGNOSIS — F32.1 MAJOR DEPRESSIVE DISORDER, SINGLE EPISODE, MODERATE WITH ANXIOUS DISTRESS (H): Primary | ICD-10-CM

## 2017-06-06 PROCEDURE — 90834 PSYTX W PT 45 MINUTES: CPT | Performed by: SOCIAL WORKER

## 2017-06-06 NOTE — PROGRESS NOTES
Progress Note    Client Name: Maldonado Mendiola  Date:6/06/17          Service Type: Individual      Session Start Time: 8;30  Session End Time: 9:15      Session Length: 45     Session #: 5     Attendees: Client    Treatment Plan Last Reviewed: started tx plan today 5-23-17  PHQ-9 / NICOLE-7 : Complete next session     DATA      Progress Since Last Session (Related to Symptoms / Goals / Homework):   Symptoms: Stable    Homework: Achieved / completed to satisfaction-working on cognitive restructuring and mindfulness. Thought stopping process when having negative cognitions.         Episode of Care Goals: Achieved / completed to satisfaction - CONTEMPLATION (Considering change and yet undecided); Intervened by assessing the negative and positive thinking (ambivalence) about behavior change     Current / Ongoing Stressors and Concerns:   Recovery from a stroke, low self esteem, on disability and unable to work, marriage relationship issues,      Treatment Objective(s) Addressed in This Session:   use positive self-affirmations daily  Concentrate on strengths and self esteem  CBT skill development  Self compassion   Intervention:   Self esteem transfusion schedule   Mood log  Self compassion exercises   ASSESSMENT: Current Emotional / Mental Status (status of significant symptoms):   Risk status (Self / Other harm or suicidal ideation)   Client denies current fears or concerns for personal safety.   Client denies current or recent suicidal ideation or behaviors.   Client denies current or recent homicidal ideation or behaviors.   Client denies current or recent self injurious behavior or ideation.   Client denies other safety concerns.   A safety and risk management plan has not been developed at this time, however client was given the after-hours number / 911 should there be a change in any of these risk factors.     Appearance:   Appropriate    Eye Contact:   Good     Psychomotor Behavior: Restless    Attitude:   Cooperative    Orientation:   All   Speech    Rate / Production: Normal     Volume:  Soft    Mood:    Anxious  Sad    Affect:    Blunted  Subdued  Worrisome    Thought Content:  Rumination    Thought Form:  Coherent  Goal Directed    Insight:    Fair      Medication Review:   No changes to current psychiatric medication(s)     Medication Compliance:   No     Changes in Health Issues:   None reported     Chemical Use Review:   Substance Use: Chemical use reviewed, no active concerns identified      Tobacco Use: No current tobacco use.       Collateral Reports Completed:   Not Applicable    PLAN: (Client Tasks / Therapist Tasks / Other)  Client discussed goals that he wanted to work on for the future.  Client was given self esteem transfusion schedule and he is to complete daily and bring into next session to discuss low scored items.   Discussed areas where he struggled with low scores on the self esteem transfusion schedule.  Concentrated on applying self compassion to core hurts that are triggered in self.  Reviewed the mood log assignment to start developing CBT skills on a daily basis.  He will use when he gets caught in negative thinking and feelings about self.  Also write up a list of distraction activities that he can engage in that will lift his mood and distract from his negative thinking. Practice self compassion on a regular basis: Write down three things each night before bed that you are grateful for, write a letter to a friend dealing with a stroke and utilize soft touch to calm the brain down when thinking negatively about self.  Find music in life again.         NILA Posadas                                                         ________________________________________________________________________    Treatment Plan    Client's Name: Maldonado Mendiola  YOB: 1965    Date: 5/23/17    DSM-V Diagnoses: 296.22 Major Depressive  Disorder, Single Episode, Moderate _ and With anxious distress  Psychosocial / Contextual Factors: recovering from a stroke, on disability and unable to work, marital relationship issues, low self esteem,   WHODAS: Completed first session    Referral / Collaboration:  Referral to another professional/service is not indicated at this time..    Anticipated number of session or this episode of care: 20      MeasurableTreatment Goal(s) related to diagnosis / functional impairment(s)  Goal 1: Client will decrease depressive symptoms and return to normal daily functioning.     I will know I've met my goal when I am happy again and can find peace and gustavo in my life.      Objective #A (Client Action)    Client will Decrease frequency and intensity of feeling down, depressed, hopeless.  Status: Continued - Date(s): 5/23/17    Intervention(s)  Therapist will assign homework Concentrate on positive activities that he can engage in that will improve his mood.  Pick three things distraction activities that will improve his mood daily..    Objective #B  Client will improve his overall self esteem .  Status: Continued - Date(s): 5/23/17    Intervention(s)  Therapist will assign homework Complete self esteem transfusion schedule daily and bring into session to discuss low scored items..    Objective #C  Client will Identify negative self-talk and behaviors: challenge core beliefs, myths, and actions.  Status: Continued - Date(s):5/23/17     Intervention(s)  Therapist will teach thought stopping process and practice daily so that he can concentrate on what he can do instead of what he can not and improve overall mood.       Goal 2: Client will decrease overall anxiety and return to normal daily functioning.    I will know I've met my goal when I feel less anxiety about my condition and feel better about self.      Objective #A (Client Action)    Status: Continued - Date(s):5/23/17     Client will use relaxation strategies 2 times  per day to reduce the physical symptoms of anxiety.    Intervention(s)  Therapist will teach relaxation and mindfulness activities daily to alleviate anxiety.   .    Objective #B  Client will use cognitive strategies identified in therapy to challenge anxious thoughts.    Status: Continued - Date(s): 5/23/17    Intervention(s)  Therapist will assign homework Complete mood log to learn CBT skills and utilize daily or as needed .    Objective #C  Client will use  worry time  each day for 15 minutes of scheduled worry and then defer obsessive or anxious thinking until the next structured worry time.  Status: Continued - Date(s): 5/23/17    Intervention(s)  Therapist will teach how to decrease negative worry and limit time daily on things that create and produce worry and anxiety.        Client has reviewed and agreed to the above plan.      Eliezer Fox, [facility]  May 23, 2017

## 2017-07-10 ENCOUNTER — OFFICE VISIT (OUTPATIENT)
Dept: PHYSICAL MEDICINE AND REHAB | Facility: CLINIC | Age: 52
End: 2017-07-10

## 2017-07-10 VITALS
BODY MASS INDEX: 22.28 KG/M2 | SYSTOLIC BLOOD PRESSURE: 127 MMHG | DIASTOLIC BLOOD PRESSURE: 73 MMHG | HEART RATE: 91 BPM | WEIGHT: 164.5 LBS | HEIGHT: 72 IN

## 2017-07-10 DIAGNOSIS — I63.89 CEREBROVASCULAR ACCIDENT (CVA) DUE TO OTHER MECHANISM (H): Primary | ICD-10-CM

## 2017-07-10 NOTE — PROGRESS NOTES
Initial rTMS visit today.  52-year-old male with hemorrhagic stroke on Dec. 29, 2016.  Right hemiplegia. Able to move paretic fingers at least 45 degrees.  But little dexterity.  Pincer grasp between thumb and fingertips is minimal.  Dysmetria and tremor noted in both hands.  He had this before stroke he claims.Spasticity present on right with mild clonus.  Modified Prashant Scale of 1+ for finger, wrist, and elbow flexors.  Chamberlain Depression Inventory = 11.  Cognition and communication is good.  He was right handed before stroke.  His goals are to write notes, tie shoes, hold glass/spoon, and play guitar.  His box and block score was 9 on right and 67 on left.  But performance on right was more of a scooping action rather than pinching or grasping.  He did show a resting MEP in the left hemisphere.  RMT there was 56.  Avg peak-to-peak amplitude was 118.6+/-57.4 using stimulus  Of 130% of RMT (i.e. 73 MSO).  RMT for contralesional M1 was 45.  Treatment intensity=41.  10 minutes of 6 Hz priming rTMS to contralesional M1 then 10 minutes of 1 Hz principal rTMS.  Then performed stacking of blocks, gripping and releasing marshmallows and walnuts and coins.  Grasped water bottle but with difficulty. Pronation/supination is impaired much.  Weak shoulder muscles (less than antigravity).  Worked on strengthening of shoulder flexion.

## 2017-07-10 NOTE — MR AVS SNAPSHOT
After Visit Summary   7/10/2017    Maldonado Mendiola    MRN: [id number]           Patient Information     Date Of Birth          1965        Visit Information        Provider Department      7/10/2017 8:00 AM Sumeet Caballero PT [facility] Physical Medicine [facility] Rehabilitation        Today's Diagnoses     Cerebrovascular accident (CVA) due to other mechanism (H)    -  1       Follow-ups after your visit        Your next 10 appointments already scheduled     Jul 11, 2017  8:30 AM CDT   (Arrive by 8:15 AM)   [facility] with [facility] ([facility])    [address] [place] [address]   [phone]            Jul 12, 2017  8:30 AM CDT   (Arrive by 8:15 AM)   Return Visit with [facility] ([facility])    [address] [place] [address]   [phone]            Jul 13, 2017  8:30 AM CDT   (Arrive by 8:15 AM)   Return Visit with [facility] ([facility])    [address] [place] [address]   [phone]            Jul 14, 2017  8:30 AM CDT   (Arrive by 8:15 AM)   Return Visit with [facility] ([facility])    [address] [place] [address]   [phone]            Jul 17, 2017  8:30 AM CDT   (Arrive by 8:15 AM)   Return Visit with [facility] ([facility])    [address] [place] [address]   [phone]            Jul 18, 2017  8:30 AM CDT   (Arrive by 8:15 AM)   Return Visit with [facility] ([facility])     [address] [address]   [phone]            Jul 19, 2017  8:30 AM CDT   (Arrive by 8:15 AM)   Return Visit with Sumeet Caballero PT   [facility] Physical Medicine and Rehabilitation ([facility])    [address] [place] [address]   [phone]            Jul 20, 2017  8:30 AM CDT   (Arrive by 8:15 AM)   Return Visit with Sumeet Caballero PT   [facility] Physical Medicine and Rehabilitation ([facility])    [address] [place] [address]   [phone]            Jul 21, 2017  8:30 AM CDT   (Arrive by 8:15 AM)   Return Visit with Sumeet Caballero PT   [facility] Medicine and Rehabilitation ([facility])    [address] [place] [address]   [phone]            Jul 24, 2017  4:00 PM CDT   (Arrive by 3:45 PM)   MR NECK W CONTRAST ANGIOGRAM with [address] MRI ([facility])    [address] [place] [address]   [phone]           Take your medicines as usual, unless your doctor tells you not to. Bring a list of your current medicines to your exam (including vitamins, minerals and over-the-counter drugs).  You will be given intravenous contrast for this exam. To prepare:   The day before your exam, drink extra fluids at least six 8-ounce glasses (unless your doctor tells you to restrict your fluids).   Have a blood test (creatinine test) within 30 days of your exam. Go to your clinic or Diagnostic Imaging Department for this test.  The MRI machine uses a strong magnet. Please wear clothes without metal (snaps, zippers). A sweatsuit works well, or we may give you a hospital gown.  Please remove any body piercings and hair extensions before you arrive. You will also remove watches, jewelry, hairpins, wallets, dentures, partial dental plates and hearing  aids. You may wear contact lenses, and you may be able to wear your rings. We have a safe place to keep your personal items, but it is safer to leave them at home.   **IMPORTANT** THE INSTRUCTIONS BELOW ARE ONLY FOR THOSE PATIENTS WHO HAVE BEEN TOLD THEY WILL RECEIVE SEDATION OR GENERAL ANESTHESIA DURING THEIR MRI PROCEDURE:  IF YOU WILL RECEIVE SEDATION (take medicine to help you relax during your exam):   You must get the medicine from your doctor before you arrive. Bring the medicine to the exam. Do not take it at home.   Arrive one hour early. Bring someone who can take you home after the test. Your medicine will make you sleepy. After the exam, you may not drive, take a bus or take a taxi by yourself.   No eating 8 hours before your exam. You may have clear liquids up until 4 hours before your exam. (Clear liquids include water, clear tea, black coffee and fruit juice without pulp.)  IF YOU WILL RECEIVE ANESTHESIA (be asleep for your exam):   Arrive 1 1/2 hours early. Bring someone who can take you home after the test. You may not drive, take a bus or take a taxi by yourself.   No eating 8 hours before your exam. You may have clear liquids up until 4 hours before your exam. (Clear liquids include water, clear tea, black coffee and fruit juice without pulp.)  Please call the Imaging Department at your exam site with any questions.              Who to contact     Please call your clinic at [phone] to:    Ask questions about your health    Make or cancel appointments    Discuss your medicines    Learn about your test results    Speak to your doctor   If you have compliments or concerns about an experience at your clinic, or if you wish to file a complaint, please contact [facility] Patient Relations at [phone] or email us at [email or web address]         Additional Information About Your Visit        EpizymeharMediaPass Information     TabS[employer] gives you secure access to your  electronic health record. If you see a primary care provider, you can also send messages to your care team and make appointments. If you have questions, please call your primary care clinic.  If you do not have a primary care provider, please call [phone] and they will assist you.      [employer]Tap is an electronic gateway that provides easy, online access to your medical records. With [employer], you can request a clinic appointment, read your test results, renew a prescription or communicate with your care team.     To access your existing account, please contact your [facility] or call [phone] for assistance.        Care EveryWhere ID     This is your Care EveryWhere ID. This could be used by other organizations to access your [place] medical records  [id number]        Your Vitals Were     Pulse Height BMI (Body Mass Index)             91 1.829 m (6') 22.31 kg/m2          Blood Pressure from Last 3 Encounters:   07/10/17 127/73   05/17/17 134/68   03/27/17 110/72    Weight from Last 3 Encounters:   07/10/17 74.6 kg (164 lb 8 oz)   03/27/17 73 kg (161 lb)   03/15/17 74.3 kg (163 lb 11.2 oz)              Today, you had the following     No orders found for display       Primary Care Provider Office Phone # Fax #    Edmond Cano PA-C [phone]       [address] [address]        Equal Access to Services     CLAUDIO CARTY : Hadii aad ku hadasho Soomaali, waaxda luqadaha, qaybta kaalmada adeegyada, paul mccormack. So [facility] [phone].    ATENCIÓN: Si habla español, tiene a naik disposición servicios gratuitos de asistencia lingüística. Llame al [phone].    We comply with applicable federal civil rights laws and [place] laws. We do not discriminate on the basis of race, color, national origin, age, disability sex, sexual orientation or gender identity.            Thank you!      Thank you for choosing [facility] PHYSICAL MEDICINE AND REHABILITATION  for your care. Our goal is always to provide you with excellent care. Hearing back from our patients is one way we can continue to improve our services. Please take a few minutes to complete the written survey that you may receive in the mail after your visit with us. Thank you!             Your Updated Medication List - Protect others around you: Learn how to safely use, store and throw away your medicines at www.disposemymeds.org.          This list is accurate as of: 7/10/17 10:59 AM.  Always use your most recent med list.                   Brand Name Dispense Instructions for use Diagnosis    hydrOXYzine 25 MG tablet    ATARAX    60 tablet    1 to 2 tablets every 6 hours as needed for panic/anxiety    Spasticity, Insomnia, unspecified type, Panic attack       lisinopril 10 MG tablet    PRINIVIL/ZESTRIL    90 tablet    10 mg    Hemorrhagic stroke (H), Right hemiparesis (H), Dysarthria, Hypertension goal BP (blood pressure) < 140/90       propranolol 20 MG tablet    INDERAL    90 tablet    Take 1 tablet (20 mg) by mouth as needed Daily for panic / anxiety    Spasticity, Panic attack       traZODone 50 MG tablet    DESYREL    180 tablet    1 to 3 tablets, 30 minutes before bedtime    Insomnia, unspecified type

## 2017-07-10 NOTE — LETTER
7/10/2017       RE: Maldonado Mendiola  [address]  [facility] [address]     Dear Colleague,    Thank you for referring your patient, Maldonado Mendiola, to the [facility] PHYSICAL MEDICINE AND REHABILITATION at [facility]. Please see a copy of my visit note below.    Initial rTMS visit today.  52-year-old male with hemorrhagic stroke on Dec. 29, 2016.  Right hemiplegia. Able to move paretic fingers at least 45 degrees.  But little dexterity.  Pincer grasp between thumb and fingertips is minimal.  Dysmetria and tremor noted in both hands.  He had this before stroke he claims.Spasticity present on right with mild clonus.  Modified Prashant Scale of 1+ for finger, wrist, and elbow flexors.  Chamberlain Depression Inventory = 11.  Cognition and communication is good.  He was right handed before stroke.  His goals are to write notes, tie shoes, hold glass/spoon, and play guitar.  His box and block score was 9 on right and 67 on left.  But performance on right was more of a scooping action rather than pinching or grasping.  He did show a resting MEP in the left hemisphere.  RMT there was 56.  Avg peak-to-peak amplitude was 118.6+/-57.4 using stimulus  Of 130% of RMT (i.e. 73 MSO).  RMT for contralesional M1 was 45.  Treatment intensity=41.  10 minutes of 6 Hz priming rTMS to contralesional M1 then 10 minutes of 1 Hz principal rTMS.  Then performed stacking of blocks, gripping and releasing marshmallows and walnuts and coins.  Grasped water bottle but with difficulty. Pronation/supination is impaired much.  Weak shoulder muscles (less than antigravity).  Worked on strengthening of shoulder flexion.    Again, thank you for allowing me to participate in the care of your patient.      Sincerely,    Sumeet Caballero, PT

## 2017-07-11 ENCOUNTER — OFFICE VISIT (OUTPATIENT)
Dept: PHYSICAL MEDICINE AND REHAB | Facility: CLINIC | Age: 52
End: 2017-07-11

## 2017-07-11 VITALS
WEIGHT: 163 LBS | BODY MASS INDEX: 22.08 KG/M2 | SYSTOLIC BLOOD PRESSURE: 140 MMHG | HEIGHT: 72 IN | HEART RATE: 85 BPM | DIASTOLIC BLOOD PRESSURE: 79 MMHG

## 2017-07-11 DIAGNOSIS — I61.9 HEMORRHAGIC STROKE (H): ICD-10-CM

## 2017-07-11 DIAGNOSIS — R47.1 DYSARTHRIA: ICD-10-CM

## 2017-07-11 DIAGNOSIS — G81.91 RIGHT HEMIPARESIS (H): ICD-10-CM

## 2017-07-11 DIAGNOSIS — I63.89 CEREBROVASCULAR ACCIDENT (CVA) DUE TO OTHER MECHANISM (H): Primary | ICD-10-CM

## 2017-07-11 DIAGNOSIS — I10 HYPERTENSION GOAL BP (BLOOD PRESSURE) < 140/90: ICD-10-CM

## 2017-07-11 RX ORDER — LISINOPRIL 10 MG/1
TABLET ORAL
Qty: 90 TABLET | Refills: 1 | Status: SHIPPED | OUTPATIENT
Start: 2017-07-11 | End: 2018-05-10

## 2017-07-11 ASSESSMENT — PAIN SCALES - GENERAL: PAINLEVEL: NO PAIN (0)

## 2017-07-11 NOTE — MR AVS SNAPSHOT
After Visit Summary   7/11/2017    Maldonado Mendiola    MRN: [id number]           Patient Information     Date Of Birth          1965        Visit Information        Provider Department      7/11/2017 8:30 AM Sumeet Caballero PT M [facility] Physical Medicine and Rehabilitation        Today's Diagnoses     Cerebrovascular accident (CVA) due to other mechanism (H)    -  1       Follow-ups after your visit        Your next 10 appointments already scheduled     Jul 12, 2017  8:30 AM CDT   (Arrive by 8:15 AM)   [facility] ([facility])    [address] [place] [address]   [phone]            Jul 13, 2017  8:30 AM CDT   (Arrive by 8:15 AM)   Return Visit with MADISON [facility] Physical Medicine and Rehabilitation ([facility])    [address] [place] [address]   [phone]            Jul 14, 2017  8:30 AM CDT   (Arrive by 8:15 AM)   Return Visit with MADISON [facility] Physical Medicine and Rehabilitation ([facility])    [address] [place] [address]   [phone]            Jul 17, 2017  8:30 AM CDT   (Arrive by 8:15 AM)   Return Visit with MADISON [facility] Physical Medicine and Rehabilitation ([facility])    [address] [place] [address]   [phone]            Jul 18, 2017  8:30 AM CDT   (Arrive by 8:15 AM)   [facility] ([facility])    [address] [place] [address]   [phone]            Jul 19, 2017  8:30 AM CDT   (Arrive by 8:15 AM)   [facility] ([facility])     [address] [address]   [phone]            Jul 20, 2017  8:30 AM CDT   (Arrive by 8:15 AM)   Return Visit with Sumeet Caballero PT   [facility] Physical Medicine and Rehabilitation ([facility])    [address] [place] [address]   [phone]            Jul 21, 2017  8:30 AM CDT   (Arrive by 8:15 AM)   Return Visit with Sumeet Caballero PT   [facility] Physical Medicine and Rehabilitation ([facility])    [address] [place] [address]   [phone]            Jul 24, 2017  4:00 PM CDT   (Arrive by 3:45 PM)   MR NECK W CONTRAST ANGIOGRAM with [address] MRI ([facility])    [address] [place] [address]   [phone]           Take your medicines as usual, unless your doctor tells you not to. Bring a list of your current medicines to your exam (including vitamins, minerals and over-the-counter drugs).  You will be given intravenous contrast for this exam. To prepare:   The day before your exam, drink extra fluids at least six 8-ounce glasses (unless your doctor tells you to restrict your fluids).   Have a blood test (creatinine test) within 30 days of your exam. Go to your clinic or Diagnostic Imaging Department for this test.  The MRI machine uses a strong magnet. Please wear clothes without metal (snaps, zippers). A sweatsuit works well, or we may give you a hospital gown.  Please remove any body piercings and hair extensions before you arrive. You will also remove watches, jewelry, hairpins, wallets, dentures, partial dental plates and hearing aids. You may wear contact lenses, and you may be able to wear your rings. We have a safe place to keep your personal items, but it is safer to leave them at home.   **IMPORTANT** THE INSTRUCTIONS BELOW ARE ONLY FOR THOSE PATIENTS WHO HAVE BEEN TOLD THEY WILL  RECEIVE SEDATION OR GENERAL ANESTHESIA DURING THEIR MRI PROCEDURE:  IF YOU WILL RECEIVE SEDATION (take medicine to help you relax during your exam):   You must get the medicine from your doctor before you arrive. Bring the medicine to the exam. Do not take it at home.   Arrive one hour early. Bring someone who can take you home after the test. Your medicine will make you sleepy. After the exam, you may not drive, take a bus or take a taxi by yourself.   No eating 8 hours before your exam. You may have clear liquids up until 4 hours before your exam. (Clear liquids include water, clear tea, black coffee and fruit juice without pulp.)  IF YOU WILL RECEIVE ANESTHESIA (be asleep for your exam):   Arrive 1 1/2 hours early. Bring someone who can take you home after the test. You may not drive, take a bus or take a taxi by yourself.   No eating 8 hours before your exam. You may have clear liquids up until 4 hours before your exam. (Clear liquids include water, clear tea, black coffee and fruit juice without pulp.)  Please call the Imaging Department at your exam site with any questions.            Jul 24, 2017  4:45 PM CDT   (Arrive by 4:30 PM)   MR HEAD W/O CONTRAST ANGIOGRAM with [address] [facility] ([facility])    [address] [address] [phone]           Take your medicines as usual, unless your doctor tells you not to. Bring a list of your current medicines to your exam (including vitamins, minerals and over-the-counter drugs). Also bring the results of similar scans you may have had.  Please remove any body piercings and hair extensions before you arrive.  Follow your doctor s orders. If you do not, we may have to postpone your exam.  You will not have contrast for this exam. You do not need to do anything special to prepare.  The MRI machine uses a strong magnet. Please wear clothes without metal (snaps, zippers). A sweatsuit works  well, or we may give you a [facility] gown.   **IMPORTANT** THE INSTRUCTIONS BELOW ARE ONLY FOR THOSE PATIENTS WHO HAVE BEEN TOLD THEY WILL RECEIVE SEDATION OR GENERAL ANESTHESIA DURING THEIR MRI PROCEDURE:  IF YOU WILL RECEIVE SEDATION (take medicine to help you relax during your exam):   You must get the medicine from your doctor before you arrive. Bring the medicine to the exam. Do not take it at home.   Arrive one hour early. Bring someone who can take you home after the test. Your medicine will make you sleepy. After the exam, you may not drive, take a bus or take a taxi by yourself.   No eating 8 hours before your exam. You may have clear liquids up until 4 hours before your exam. (Clear liquids include water, clear tea, black coffee and fruit juice without pulp.)  IF YOU WILL RECEIVE ANESTHESIA (be asleep for your exam):   Arrive 1 1/2 hours early. Bring someone who can take you home after the test. You may not drive, take a bus or take a taxi by yourself.   No eating 8 hours before your exam. You may have clear liquids up until 4 hours before your exam. (Clear liquids include water, clear tea, black coffee and fruit juice without pulp.)   You will spend four to five hours in the recovery room.  Please call the Imaging Department at your exam site with any questions.              Who to contact     Please call your clinic at [phone] to:    Ask questions about your health    Make or cancel appointments    Discuss your medicines    Learn about your test results    Speak to your doctor   If you have compliments or concerns about an experience at your clinic, or if you wish to file a complaint, please contact [facility] Physicians Patient Relations at [phone] or email us at [email or web address]         Additional Information About Your Visit        V[employer] Magn[employer] Information     D[employer] gives you secure access to your electronic health record. If you see a primary care provider, you  can also send messages to your care team and make appointments. If you have questions, please call your primary care clinic.  If you do not have a primary care provider, please call [phone] and they will assist you.      [employer] is an electronic gateway that provides easy, online access to your medical records. With [employer], you can request a clinic appointment, read your test results, renew a prescription or communicate with your care team.     To access your existing account, please contact your [facility] or call [phone] for assistance.        Care EveryWhere ID     This is your Care EveryWhere ID. This could be used by other organizations to access your [place] medical records  [id number]        Your Vitals Were     Pulse Height BMI (Body Mass Index)             85 1.829 m (6') 22.11 kg/m2          Blood Pressure from Last 3 Encounters:   07/11/17 140/79   07/10/17 127/73   05/17/17 134/68    Weight from Last 3 Encounters:   07/11/17 73.9 kg (163 lb)   07/10/17 74.6 kg (164 lb 8 oz)   03/27/17 73 kg (161 lb)              Today, you had the following     No orders found for display       Primary Care Provider Office Phone # Fax #    Edmond Cano PA-C [phone]       [address] [address]        Equal Access to Services     CLAUDIO CARTY : Hadii aad ku hadasho Soomaali, waaxda luqadaha, qaybta kaalmada adeegyada, paul henriquez . So [facility] [phone].    ATENCIÓN: Si habla español, tiene a naik disposición servicios gratuitos de asistencia lingüística. Llame al [phone].    We comply with applicable federal civil rights laws and [place] laws. We do not discriminate on the basis of race, color, national origin, age, disability sex, sexual orientation or gender identity.            Thank you!     Thank you for choosing [facility] PHYSICAL MEDICINE AND REHABILITATION  for  your care. Our goal is always to provide you with excellent care. Hearing back from our patients is one way we can continue to improve our services. Please take a few minutes to complete the written survey that you may receive in the mail after your visit with us. Thank you!             Your Updated Medication List - Protect others around you: Learn how to safely use, store and throw away your medicines at www.disposemymeds.org.          This list is accurate as of: 7/11/17 11:53 AM.  Always use your most recent med list.                   Brand Name Dispense Instructions for use Diagnosis    hydrOXYzine 25 MG tablet    ATARAX    60 tablet    1 to 2 tablets every 6 hours as needed for panic/anxiety    Spasticity, Insomnia, unspecified type, Panic attack       lisinopril 10 MG tablet    PRINIVIL/ZESTRIL    90 tablet    10 mg    Hemorrhagic stroke (H), Right hemiparesis (H), Dysarthria, Hypertension goal BP (blood pressure) < 140/90       propranolol 20 MG tablet    INDERAL    90 tablet    Take 1 tablet (20 mg) by mouth as needed Daily for panic / anxiety    Spasticity, Panic attack       traZODone 50 MG tablet    DESYREL    180 tablet    1 to 3 tablets, 30 minutes before bedtime    Insomnia, unspecified type

## 2017-07-11 NOTE — PROGRESS NOTES
No adverse events.  Patient reports taking two naps at home after first treatment, which is not uncommon.  RMT at contralesional M1 was 48 today.  Treatment intensity there = 43.  10 min of 6 Hz priming rTMS then 10 min of low-rate principal rTMS.  Ex included strengthening of shoulder flex and elbow ext.  Practiced picking up small objects.  He has difficulty supinating and pronating.  Worked on these with Smart Glove.  Also applied estim to thumb intrinsics to promote thumb opposition.

## 2017-07-11 NOTE — TELEPHONE ENCOUNTER
Lisinopril      Last Written Prescription Date: 3/6/2017  Last Fill Quantity: 90, # refills: 0  Last Office Visit with [facility] or [facility] prescribing provider: 3/27/2017       Potassium   Date Value Ref Range Status   01/07/2017 4.0 3.4 - 5.3 mmol/L Final     Creatinine   Date Value Ref Range Status   01/07/2017 0.68 0.66 - 1.25 mg/dL Final     BP Readings from Last 3 Encounters:   07/11/17 140/79   07/10/17 127/73   05/17/17 134/68     Manuel Colin, Pharm.D.  [facility]  [phone]

## 2017-07-11 NOTE — LETTER
7/11/2017       RE: Maldonado Mendiola  [address]  [facility] [address]     Dear Colleague,    Thank you for referring your patient, Maldonado Mendiola, to the [facility] PHYSICAL MEDICINE AND REHABILITATION at [facility]. Please see a copy of my visit note below.    No adverse events.  Patient reports taking two naps at home after first treatment, which is not uncommon.  RMT at contralesional M1 was 48 today.  Treatment intensity there = 43.  10 min of 6 Hz priming rTMS then 10 min of low-rate principal rTMS.  Ex included strengthening of shoulder flex and elbow ext.  Practiced picking up small objects.  He has difficulty supinating and pronating.  Worked on these with Smart Glove.  Also applied estim to thumb intrinsics to promote thumb opposition.    Again, thank you for allowing me to participate in the care of your patient.      Sincerely,    Sumeet Caballero, PT

## 2017-07-12 ENCOUNTER — OFFICE VISIT (OUTPATIENT)
Dept: PHYSICAL MEDICINE AND REHAB | Facility: CLINIC | Age: 52
End: 2017-07-12

## 2017-07-12 VITALS
HEART RATE: 99 BPM | DIASTOLIC BLOOD PRESSURE: 82 MMHG | BODY MASS INDEX: 22.07 KG/M2 | HEIGHT: 72 IN | WEIGHT: 162.92 LBS | SYSTOLIC BLOOD PRESSURE: 135 MMHG

## 2017-07-12 DIAGNOSIS — I63.89 CEREBROVASCULAR ACCIDENT (CVA) DUE TO OTHER MECHANISM (H): Primary | ICD-10-CM

## 2017-07-12 ASSESSMENT — PAIN SCALES - GENERAL: PAINLEVEL: NO PAIN (0)

## 2017-07-12 NOTE — MR AVS SNAPSHOT
After Visit Summary   7/12/2017    Maldonado Mendiola    MRN: [id number]           Patient Information     Date Of Birth          1965        Visit Information        Provider Department      7/12/2017 8:30 AM Sumeet Caballero PT [facility]        Today's Diagnoses     Cerebrovascular accident (CVA) due to other mechanism (H)    -  1       Follow-ups after your visit        Your next 10 appointments already scheduled     Jul 13, 2017  8:30 AM CDT   (Arrive by 8:15 AM)   [facility] with [facility] ([facility])    [address] [place] [address]   [phone]            Jul 14, 2017  8:30 AM CDT   (Arrive by 8:15 AM)   Return Visit with [facility] ([facility])    [address] [place] [address]   [phone]            Jul 17, 2017  8:30 AM CDT   (Arrive by 8:15 AM)   Return Visit with [facility] ([facility])    [address] [place] [address]   [phone]            Jul 18, 2017  8:30 AM CDT   (Arrive by 8:15 AM)   Return Visit with [facility] ([facility])    [address] [place] [address]   [phone]            Jul 19, 2017  8:30 AM CDT   (Arrive by 8:15 AM)   Return Visit with [facility] ([facility])    [address] [place] [address]   [phone]            Jul 20, 2017  8:30 AM CDT   (Arrive by 8:15 AM)   Return Visit with [facility] ([facility])     [address] [facility]  3rd [facility] [address]   [phone]            Jul 21, 2017  8:30 AM CDT   (Arrive by 8:15 AM)   Return Visit with Sumeet Caballero PT   [facility] ([facility])    [address] [place] [address]  3rd [facility] [address]   [phone]            Jul 24, 2017  4:00 PM CDT   (Arrive by 3:45 PM)   MR NECK W CONTRAST ANGIOGRAM with [address] [facility] [place] [facility] ([facility])    [address] [address]   [phone]           Take your medicines as usual, unless your doctor tells you not to. Bring a list of your current medicines to your exam (including vitamins, minerals and over-the-counter drugs).  You will be given intravenous contrast for this exam. To prepare:   The day before your exam, drink extra fluids at least six 8-ounce glasses (unless your doctor tells you to restrict your fluids).   Have a blood test (creatinine test) within 30 days of your exam. Go to your clinic or Diagnostic Imaging Department for this test.  The MRI machine uses a strong magnet. Please wear clothes without metal (snaps, zippers). A sweatsuit works well, or we may give you a hospital gown.  Please remove any body piercings and hair extensions before you arrive. You will also remove watches, jewelry, hairpins, wallets, dentures, partial dental plates and hearing aids. You may wear contact lenses, and you may be able to wear your rings. We have a safe place to keep your personal items, but it is safer to leave them at home.   **IMPORTANT** THE INSTRUCTIONS BELOW ARE ONLY FOR THOSE PATIENTS WHO HAVE BEEN TOLD THEY WILL RECEIVE SEDATION OR GENERAL ANESTHESIA DURING THEIR MRI PROCEDURE:  IF YOU WILL RECEIVE SEDATION (take medicine to help you relax during your exam):   You must get the medicine from your doctor before you arrive. Bring the medicine to the exam. Do not take it  at home.   Arrive one hour early. Bring someone who can take you home after the test. Your medicine will make you sleepy. After the exam, you may not drive, take a bus or take a taxi by yourself.   No eating 8 hours before your exam. You may have clear liquids up until 4 hours before your exam. (Clear liquids include water, clear tea, black coffee and fruit juice without pulp.)  IF YOU WILL RECEIVE ANESTHESIA (be asleep for your exam):   Arrive 1 1/2 hours early. Bring someone who can take you home after the test. You may not drive, take a bus or take a taxi by yourself.   No eating 8 hours before your exam. You may have clear liquids up until 4 hours before your exam. (Clear liquids include water, clear tea, black coffee and fruit juice without pulp.)  Please call the Imaging Department at your exam site with any questions.            Jul 24, 2017  4:45 PM CDT   (Arrive by 4:30 PM)   MR HEAD W/O CONTRAST ANGIOGRAM with [address] MRI ([facility])    [address] [place] [address] [phone]           Take your medicines as usual, unless your doctor tells you not to. Bring a list of your current medicines to your exam (including vitamins, minerals and over-the-counter drugs). Also bring the results of similar scans you may have had.  Please remove any body piercings and hair extensions before you arrive.  Follow your doctor s orders. If you do not, we may have to postpone your exam.  You will not have contrast for this exam. You do not need to do anything special to prepare.  The MRI machine uses a strong magnet. Please wear clothes without metal (snaps, zippers). A sweatsuit works well, or we may give you a hospital gown.   **IMPORTANT** THE INSTRUCTIONS BELOW ARE ONLY FOR THOSE PATIENTS WHO HAVE BEEN TOLD THEY WILL RECEIVE SEDATION OR GENERAL ANESTHESIA DURING THEIR MRI PROCEDURE:  IF YOU WILL RECEIVE SEDATION (take medicine to help you  relax during your exam):   You must get the medicine from your doctor before you arrive. Bring the medicine to the exam. Do not take it at home.   Arrive one hour early. Bring someone who can take you home after the test. Your medicine will make you sleepy. After the exam, you may not drive, take a bus or take a taxi by yourself.   No eating 8 hours before your exam. You may have clear liquids up until 4 hours before your exam. (Clear liquids include water, clear tea, black coffee and fruit juice without pulp.)  IF YOU WILL RECEIVE ANESTHESIA (be asleep for your exam):   Arrive 1 1/2 hours early. Bring someone who can take you home after the test. You may not drive, take a bus or take a taxi by yourself.   No eating 8 hours before your exam. You may have clear liquids up until 4 hours before your exam. (Clear liquids include water, clear tea, black coffee and fruit juice without pulp.)   You will spend four to five hours in the recovery room.  Please call the Imaging Department at your exam site with any questions.            Jul 24, 2017  5:30 PM CDT   (Arrive by 5:15 PM)   MR BRAIN W/O & W CONTRAST with [address] MRI ([facility])    [address] [address] [phone]           Take your medicines as usual, unless your doctor tells you not to. Bring a list of your current medicines to your exam (including vitamins, minerals and over-the-counter drugs).  You will be given intravenous contrast for this exam. To prepare:   The day before your exam, drink extra fluids at least six 8-ounce glasses (unless your doctor tells you to restrict your fluids).   Have a blood test (creatinine test) within 30 days of your exam. Go to your clinic or Diagnostic Imaging Department for this test.  The MRI machine uses a strong magnet. Please wear clothes without metal (snaps, zippers). A sweatsuit works well, or we may give you a hospital gown.   Please remove any body piercings and hair extensions before you arrive. You will also remove watches, jewelry, hairpins, wallets, dentures, partial dental plates and hearing aids. You may wear contact lenses, and you may be able to wear your rings. We have a safe place to keep your personal items, but it is safer to leave them at home.   **IMPORTANT** THE INSTRUCTIONS BELOW ARE ONLY FOR THOSE PATIENTS WHO HAVE BEEN TOLD THEY WILL RECEIVE SEDATION OR GENERAL ANESTHESIA DURING THEIR MRI PROCEDURE:  IF YOU WILL RECEIVE SEDATION (take medicine to help you relax during your exam):   You must get the medicine from your doctor before you arrive. Bring the medicine to the exam. Do not take it at home.   Arrive one hour early. Bring someone who can take you home after the test. Your medicine will make you sleepy. After the exam, you may not drive, take a bus or take a taxi by yourself.   No eating 8 hours before your exam. You may have clear liquids up until 4 hours before your exam. (Clear liquids include water, clear tea, black coffee and fruit juice without pulp.)  IF YOU WILL RECEIVE ANESTHESIA (be asleep for your exam):   Arrive 1 1/2 hours early. Bring someone who can take you home after the test. You may not drive, take a bus or take a taxi by yourself.   No eating 8 hours before your exam. You may have clear liquids up until 4 hours before your exam. (Clear liquids include water, clear tea, black coffee and fruit juice without pulp.)  Please call the Imaging Department at your exam site with any questions.              Who to contact     Please call your clinic at [phone] to:    Ask questions about your health    Make or cancel appointments    Discuss your medicines    Learn about your test results    Speak to your doctor   If you have compliments or concerns about an experience at your clinic, or if you wish to file a complaint, please contact [facility] Patient Relations at [phone]  or email us at [email or web address]         Additional Information About Your Visit        [employer] Information     [employer] gives you secure access to your electronic health record. If you see a primary care provider, you can also send messages to your care team and make appointments. If you have questions, please call your primary care clinic.  If you do not have a primary care provider, please call [phone] and they will assist you.      [employer] is an electronic gateway that provides easy, online access to your medical records. With [employer], you can request a clinic appointment, read your test results, renew a prescription or communicate with your care team.     To access your existing account, please contact your [facility] or call [phone] for assistance.        Care EveryWhere ID     This is your Care EveryWhere ID. This could be used by other organizations to access your [place] medical records  [id number]        Your Vitals Were     Pulse Height BMI (Body Mass Index)             99 1.829 m (6') 22.1 kg/m2          Blood Pressure from Last 3 Encounters:   07/12/17 135/82   07/11/17 140/79   07/10/17 127/73    Weight from Last 3 Encounters:   07/12/17 73.9 kg (162 lb 14.7 oz)   07/11/17 73.9 kg (163 lb)   07/10/17 74.6 kg (164 lb 8 oz)              Today, you had the following     No orders found for display       Primary Care Provider Office Phone # Fax #    Edmond Cano PA-C [phone]       [address] [address]        Equal Access to Services     CLAUDIO CARTY : Hadii aad ku hadasho Soomaali, waaxda luqadaha, qaybta kaalmada adeegyada, paul mccormack. So [facility] [phone].    ATENCIÓN: Si habla español, tiene a naik disposición servicios gratuitos de asistencia lingüística. Llame al [phone].    We comply with applicable federal civil rights laws and  [place] laws. We do not discriminate on the basis of race, color, national origin, age, disability sex, sexual orientation or gender identity.            Thank you!     Thank you for choosing [facility]IC[facility]HABILITATION  for your care. Our goal is always to provide you with excellent care. Hearing back from our patients is one way we can continue to improve our services. Please take a few minutes to complete the written survey that you may receive in the mail after your visit with us. Thank you!             Your Updated Medication List - Protect others around you: Learn how to safely use, store and throw away your medicines at www.disposemymeds.org.          This list is accurate as of: 7/12/17 11:18 AM.  Always use your most recent med list.                   Brand Name Dispense Instructions for use Diagnosis    hydrOXYzine 25 MG tablet    ATARAX    60 tablet    1 to 2 tablets every 6 hours as needed for panic/anxiety    Spasticity, Insomnia, unspecified type, Panic attack       lisinopril 10 MG tablet    PRINIVIL/ZESTRIL    90 tablet    1 tab daily    Hemorrhagic stroke (H), Right hemiparesis (H), Dysarthria, Hypertension goal BP (blood pressure) < 140/90       propranolol 20 MG tablet    INDERAL    90 tablet    Take 1 tablet (20 mg) by mouth as needed Daily for panic / anxiety    Spasticity, Panic attack       traZODone 50 MG tablet    DESYREL    180 tablet    1 to 3 tablets, 30 minutes before bedtime    Insomnia, unspecified type

## 2017-07-12 NOTE — LETTER
7/12/2017       RE: Maldonado Mendiola  [address]  [facility] [address]     Dear Colleague,    Thank you for referring your patient, Maldonado Mendiola, to the [facility] PH[facility]ICAL [facility]ICINE AND REHABILITATION at [facility]. Please see a copy of my visit note below.    No adverse events.  Patient reports improvements in finger grasp and release activities at home.  RMT at contralesional M1  = 48.  Treatment intensity = 43.  10 min of 6 hz priming rtms then 10 min of 1 hz principal rtms to contralesional M1.  Then finger exercises and strengthening to shoulder flexors and pronation and supination.  Index finger/thumb pincer grasp is improving but he needs more pronation and supination to make this functional.  Gave him home ex for pro/sup.    Again, thank you for allowing me to participate in the care of your patient.      Sincerely,    Sumeet Caballero, PT

## 2017-07-12 NOTE — PROGRESS NOTES
No adverse events.  Patient reports improvements in finger grasp and release activities at home.  RMT at contralesional M1  = 48.  Treatment intensity = 43.  10 min of 6 hz priming rtms then 10 min of 1 hz principal rtms to contralesional M1.  Then finger exercises and strengthening to shoulder flexors and pronation and supination.  Index finger/thumb pincer grasp is improving but he needs more pronation and supination to make this functional.  Gave him home ex for pro/sup.

## 2017-07-13 ENCOUNTER — OFFICE VISIT (OUTPATIENT)
Dept: PHYSICAL MEDICINE AND REHAB | Facility: CLINIC | Age: 52
End: 2017-07-13

## 2017-07-13 VITALS
HEART RATE: 87 BPM | DIASTOLIC BLOOD PRESSURE: 79 MMHG | SYSTOLIC BLOOD PRESSURE: 126 MMHG | BODY MASS INDEX: 22.07 KG/M2 | WEIGHT: 162.92 LBS | HEIGHT: 72 IN

## 2017-07-13 DIAGNOSIS — I63.89 CEREBROVASCULAR ACCIDENT (CVA) DUE TO OTHER MECHANISM (H): Primary | ICD-10-CM

## 2017-07-13 ASSESSMENT — PAIN SCALES - GENERAL: PAINLEVEL: NO PAIN (0)

## 2017-07-13 NOTE — PROGRESS NOTES
No adverse events.  Patient continues to report using hand for more activities at home (opening refrigerator, etc).  RMT at contralesional M1=48 again today.  Increased treatment intensity to 100% of RMT, thus 48.  10 minutes of 6 Hz priming rTMS followed by 10 min of 1 Hz principal rTMS to contralesional M1.  Finger exercises, shoulder and forearm pro/sup strengthening.  Ate a breakfast bar with right hand.  Drank from water bottle primarily using right hand but needed some assist from left hand.  Pronation remains a problem during functional activities.  He does have active pronation but during tasks involving the biceps for elbow flexion, supination occurs also which he has difficulty overcoming.  Did tracking ex with right index finger.  Scores in mid 400s.

## 2017-07-13 NOTE — MR AVS SNAPSHOT
After Visit Summary   7/13/2017    Maldonado Mendiola    MRN: [id number]           Patient Information     Date Of Birth          1965        Visit Information        Provider Department      7/13/2017 8:30 AM Sumeet Caballero PT M [facility] Physical Medicine [facility]        Today's Diagnoses     Cerebrovascular accident (CVA) due to other mechanism (H)    -  1       Follow-ups after your visit        Your next 10 appointments already scheduled     Jul 14, 2017  8:30 AM CDT   (Arrive by 8:15 AM)   [facility] with [facility] ([facility])    [address] [place] [address]   [phone]            Jul 17, 2017  8:30 AM CDT   (Arrive by 8:15 AM)   Return Visit with MADISON Santo [facility] ([facility])    [address] [place] [address]   [phone]            Jul 18, 2017  8:30 AM CDT   (Arrive by 8:15 AM)   Return Visit with MADISON Santo [facility] ([facility])    [address] [place] [address]   [phone]            Jul 19, 2017  8:30 AM CDT   (Arrive by 8:15 AM)   Return Visit with MADISON Santo [facility] ([facility])    [address] [place] [address]   [phone]            Jul 20, 2017  8:30 AM CDT   (Arrive by 8:15 AM)   [facility])    [address] [place] [address]   [phone]            Jul 21, 2017  8:30 AM CDT   (Arrive by 8:15 AM)   Return Visit with [facility] ([facility])     [address]  3rd Floor  [facility] [address] [phone]            Jul 24, 2017  4:00 PM CDT   (Arrive by 3:45 PM)   MR NECK W CONTRAST ANGIOGRAM with [id number]   [facility] ([facility])    [address] [facility]  1st [facility] [address] [phone]           Take your medicines as usual, unless your doctor tells you not to. Bring a list of your current medicines to your exam (including vitamins, minerals and over-the-counter drugs).  You will be given intravenous contrast for this exam. To prepare:   The day before your exam, drink extra fluids at least six 8-ounce glasses (unless your doctor tells you to restrict your fluids).   Have a blood test (creatinine test) within 30 days of your exam. Go to your clinic or Diagnostic Imaging Department for this test.  The MRI machine uses a strong magnet. Please wear clothes without metal (snaps, zippers). A sweatsuit works well, or we may give you a hospital gown.  Please remove any body piercings and hair extensions before you arrive. You will also remove watches, jewelry, hairpins, wallets, dentures, partial dental plates and hearing aids. You may wear contact lenses, and you may be able to wear your rings. We have a safe place to keep your personal items, but it is safer to leave them at home.   **IMPORTANT** THE INSTRUCTIONS BELOW ARE ONLY FOR THOSE PATIENTS WHO HAVE BEEN TOLD THEY WILL RECEIVE SEDATION OR GENERAL ANESTHESIA DURING THEIR MRI PROCEDURE:  IF YOU WILL RECEIVE SEDATION (take medicine to help you relax during your exam):   You must get the medicine from your doctor before you arrive. Bring the medicine to the exam. Do not take it at home.   Arrive one hour early. Bring someone who can take you home after the test. Your medicine will make you sleepy. After the exam, you may not drive, take a bus or take a taxi by yourself.   No eating 8 hours before your exam. You may have clear liquids  up until 4 hours before your exam. (Clear liquids include water, clear tea, black coffee and fruit juice without pulp.)  IF YOU WILL RECEIVE ANESTHESIA (be asleep for your exam):   Arrive 1 1/2 hours early. Bring someone who can take you home after the test. You may not drive, take a bus or take a taxi by yourself.   No eating 8 hours before your exam. You may have clear liquids up until 4 hours before your exam. (Clear liquids include water, clear tea, black coffee and fruit juice without pulp.)  Please call the Imaging Department at your exam site with any questions.            Jul 24, 2017  4:45 PM CDT   (Arrive by 4:30 PM)   MR HEAD W/O CONTRAST ANGIOGRAM with [address] MRI ([facility])    [address] [address] [phone]           Take your medicines as usual, unless your doctor tells you not to. Bring a list of your current medicines to your exam (including vitamins, minerals and over-the-counter drugs). Also bring the results of similar scans you may have had.  Please remove any body piercings and hair extensions before you arrive.  Follow your doctor s orders. If you do not, we may have to postpone your exam.  You will not have contrast for this exam. You do not need to do anything special to prepare.  The MRI machine uses a strong magnet. Please wear clothes without metal (snaps, zippers). A sweatsuit works well, or we may give you a hospital gown.   **IMPORTANT** THE INSTRUCTIONS BELOW ARE ONLY FOR THOSE PATIENTS WHO HAVE BEEN TOLD THEY WILL RECEIVE SEDATION OR GENERAL ANESTHESIA DURING THEIR MRI PROCEDURE:  IF YOU WILL RECEIVE SEDATION (take medicine to help you relax during your exam):   You must get the medicine from your doctor before you arrive. Bring the medicine to the exam. Do not take it at home.   Arrive one hour early. Bring someone who can take you home after the test. Your medicine will make you sleepy.  After the exam, you may not drive, take a bus or take a taxi by yourself.   No eating 8 hours before your exam. You may have clear liquids up until 4 hours before your exam. (Clear liquids include water, clear tea, black coffee and fruit juice without pulp.)  IF YOU WILL RECEIVE ANESTHESIA (be asleep for your exam):   Arrive 1 1/2 hours early. Bring someone who can take you home after the test. You may not drive, take a bus or take a taxi by yourself.   No eating 8 hours before your exam. You may have clear liquids up until 4 hours before your exam. (Clear liquids include water, clear tea, black coffee and fruit juice without pulp.)   You will spend four to five hours in the recovery room.  Please call the Imaging Department at your exam site with any questions.            Jul 24, 2017  5:30 PM CDT   (Arrive by 5:15 PM)   MR BRAIN W/O & W CONTRAST with [address] MRI ([facility])    [address] [address] [phone]           Take your medicines as usual, unless your doctor tells you not to. Bring a list of your current medicines to your exam (including vitamins, minerals and over-the-counter drugs).  You will be given intravenous contrast for this exam. To prepare:   The day before your exam, drink extra fluids at least six 8-ounce glasses (unless your doctor tells you to restrict your fluids).   Have a blood test (creatinine test) within 30 days of your exam. Go to your clinic or Diagnostic Imaging Department for this test.  The MRI machine uses a strong magnet. Please wear clothes without metal (snaps, zippers). A sweatsuit works well, or we may give you a hospital gown.  Please remove any body piercings and hair extensions before you arrive. You will also remove watches, jewelry, hairpins, wallets, dentures, partial dental plates and hearing aids. You may wear contact lenses, and you may be able to wear your rings. We have a  safe place to keep your personal items, but it is safer to leave them at home.   **IMPORTANT** THE INSTRUCTIONS BELOW ARE ONLY FOR THOSE PATIENTS WHO HAVE BEEN TOLD THEY WILL RECEIVE SEDATION OR GENERAL ANESTHESIA DURING THEIR MRI PROCEDURE:  IF YOU WILL RECEIVE SEDATION (take medicine to help you relax during your exam):   You must get the medicine from your doctor before you arrive. Bring the medicine to the exam. Do not take it at home.   Arrive one hour early. Bring someone who can take you home after the test. Your medicine will make you sleepy. After the exam, you may not drive, take a bus or take a taxi by yourself.   No eating 8 hours before your exam. You may have clear liquids up until 4 hours before your exam. (Clear liquids include water, clear tea, black coffee and fruit juice without pulp.)  IF YOU WILL RECEIVE ANESTHESIA (be asleep for your exam):   Arrive 1 1/2 hours early. Bring someone who can take you home after the test. You may not drive, take a bus or take a taxi by yourself.   No eating 8 hours before your exam. You may have clear liquids up until 4 hours before your exam. (Clear liquids include water, clear tea, black coffee and fruit juice without pulp.)  Please call the Imaging Department at your exam site with any questions.            Jul 25, 2017  9:00 AM CDT   (Arrive by 8:45 AM)   Return Stroke with Franklin Hidalgo MD   [facility] ([facility])    [address] [place] [address] [phone]              Who to contact     Please call your clinic at [phone] to:    Ask questions about your health    Make or cancel appointments    Discuss your medicines    Learn about your test results    Speak to your doctor   If you have compliments or concerns about an experience at your clinic, or if you wish to file a complaint, please contact [facility] Patient Relations at [phone] or email us  at [email or web address]         Additional Information About Your Visit        MyChart Information     [employer] gives you secure access to your electronic health record. If you see a primary care provider, you can also send messages to your care team and make appointments. If you have questions, please call your primary care clinic.  If you do not have a primary care provider, please call [phone] and they will assist you.      [employer] is an electronic gateway that provides easy, online access to your medical records. With [employer], you can request a clinic appointment, read your test results, renew a prescription or communicate with your care team.     To access your existing account, please contact your [facility] or call [phone] for assistance.        Care EveryWhere ID     This is your Care EveryWhere ID. This could be used by other organizations to access your [place] medical records  [id number]        Your Vitals Were     Pulse Height BMI (Body Mass Index)             87 1.829 m (6') 22.1 kg/m2          Blood Pressure from Last 3 Encounters:   07/13/17 126/79   07/12/17 135/82   07/11/17 140/79    Weight from Last 3 Encounters:   07/13/17 73.9 kg (162 lb 14.7 oz)   07/12/17 73.9 kg (162 lb 14.7 oz)   07/11/17 73.9 kg (163 lb)              Today, you had the following     No orders found for display       Primary Care Provider Office Phone # Fax #    Edmond Cano PA-C [phone]       [address] [address]        Equal Access to Services     CLAUDIO CARTY : Hadii aad ku hadasho Soomaali, waaxda luqadaha, qaybta kaalmada adeegyada, paul mccormack. So [facility] [phone].    ATENCIÓN: Si habla español, tiene a naik disposición servicios gratuitos de asistencia lingüística. Llame al [phone].    We comply with applicable federal civil rights laws and [place]  laws. We do not discriminate on the basis of race, color, national origin, age, disability sex, sexual orientation or gender identity.            Thank you!     Thank you for choosing [facility] PHYSICAL MEDICINE AND REHABILITATION  for your care. Our goal is always to provide you with excellent care. Hearing back from our patients is one way we can continue to improve our services. Please take a few minutes to complete the written survey that you may receive in the mail after your visit with us. Thank you!             Your Updated Medication List - Protect others around you: Learn how to safely use, store and throw away your medicines at www.disposemymeds.org.          This list is accurate as of: 7/13/17 11:21 AM.  Always use your most recent med list.                   Brand Name Dispense Instructions for use Diagnosis    hydrOXYzine 25 MG tablet    ATARAX    60 tablet    1 to 2 tablets every 6 hours as needed for panic/anxiety    Spasticity, Insomnia, unspecified type, Panic attack       lisinopril 10 MG tablet    PRINIVIL/ZESTRIL    90 tablet    1 tab daily    Hemorrhagic stroke (H), Right hemiparesis (H), Dysarthria, Hypertension goal BP (blood pressure) < 140/90       propranolol 20 MG tablet    INDERAL    90 tablet    Take 1 tablet (20 mg) by mouth as needed Daily for panic / anxiety    Spasticity, Panic attack       traZODone 50 MG tablet    DESYREL    180 tablet    1 to 3 tablets, 30 minutes before bedtime    Insomnia, unspecified type

## 2017-07-13 NOTE — LETTER
7/13/2017       RE: Maldonado Mendiola  [address]  [facility] [address]     Dear Colleague,    Thank you for referring your patient, Maldonado Mendiola, to the [facility] PH[facility]ICAL [facility]ICINE AND REHABILITATION at [facility]. Please see a copy of my visit note below.    No adverse events.  Patient continues to report using hand for more activities at home (opening refrigerator, etc).  RMT at contralesional M1=48 again today.  Increased treatment intensity to 100% of RMT, thus 48.  10 minutes of 6 Hz priming rTMS followed by 10 min of 1 Hz principal rTMS to contralesional M1.  Finger exercises, shoulder and forearm pro/sup strengthening.  Ate a breakfast bar with right hand.  Drank from water bottle primarily using right hand but needed some assist from left hand.  Pronation remains a problem during functional activities.  He does have active pronation but during tasks involving the biceps for elbow flexion, supination occurs also which he has difficulty overcoming.  Did tracking ex with right index finger.  Scores in mid 400s.    Again, thank you for allowing me to participate in the care of your patient.      Sincerely,    Sumeet Caballero, PT

## 2017-07-14 ENCOUNTER — OFFICE VISIT (OUTPATIENT)
Dept: PHYSICAL MEDICINE AND REHAB | Facility: CLINIC | Age: 52
End: 2017-07-14

## 2017-07-14 VITALS
WEIGHT: 162.92 LBS | HEIGHT: 72 IN | SYSTOLIC BLOOD PRESSURE: 133 MMHG | DIASTOLIC BLOOD PRESSURE: 81 MMHG | HEART RATE: 80 BPM | BODY MASS INDEX: 22.07 KG/M2

## 2017-07-14 DIAGNOSIS — I63.89 CEREBROVASCULAR ACCIDENT (CVA) DUE TO OTHER MECHANISM (H): Primary | ICD-10-CM

## 2017-07-14 ASSESSMENT — PAIN SCALES - GENERAL: PAINLEVEL: NO PAIN (0)

## 2017-07-14 NOTE — PROGRESS NOTES
No adverse events.  Patient reports he was able to tie strings of his exercise shorts and also zip zipper of his pants.  RMT =45 at contralesional M1.  Treatment intensity = 45.  10 min of 6 Hz priming rtims to contralesional M1 followed by 10 min of 1 Hz principal rTMS.  Usual finger exercises.  Added turning cards.  This was difficult because of the impaired pronation and supination.  Will continue.

## 2017-07-14 NOTE — MR AVS SNAPSHOT
After Visit Summary   7/14/2017    Maldonado Mendiola    MRN: [id number]           Patient Information     Date Of Birth          1965        Visit Information        Provider Department      7/14/2017 8:30 AM Sumeet Caballero PT M [facility] Physical Medicine and Rehabilitation        Today's Diagnoses     Cerebrovascular accident (CVA) due to other mechanism (H)    -  1       Follow-ups after your visit        Your next 10 appointments already scheduled     Jul 17, 2017  8:30 AM CDT   (Arrive by 8:15 AM)   Return Visit with MADISON Santo [facility] Physical Medicine and Rehabilitation ([facility])    [address] [place] [address]   [phone]            Jul 18, 2017  8:30 AM CDT   (Arrive by 8:15 AM)   Return Visit with MADISON Santo [facility] Physical Medicine and Rehabilitation ([facility])    [address] [place] [address]   [phone]            Jul 19, 2017  8:30 AM CDT   (Arrive by 8:15 AM)   Return Visit with MADISON Santo [facility] Physical Medicine and Rehabilitation ([facility])    [address] [place] [address]   [phone]            Jul 20, 2017  8:30 AM CDT   (Arrive by 8:15 AM)   Return Visit with MADISON Santo [facility] Physical Medicine and Rehabilitation ([facility])    [address] [place] [address]   [phone]            Jul 21, 2017  8:30 AM CDT   (Arrive by 8:15 AM)   Return Visit with MADISON Santo [facility] Physical Medicine and Rehabilitation ([facility])    [address] [place] [address]   [phone]            Jul 24, 2017  4:00 PM CDT   (Arrive by 3:45 PM)   MR NECK W CONTRAST ANGIOGRAM with UC1   [facility] MRI ([facility])    [address] [place] [address]  [address] [facility] [address] [phone]           Take your medicines as usual, unless your doctor tells you not to. Bring a list of your current medicines to your exam (including vitamins, minerals and over-the-counter drugs).  You will be given intravenous contrast for this exam. To prepare:   The day before your exam, drink extra fluids at least six 8-ounce glasses (unless your doctor tells you to restrict your fluids).   Have a blood test (creatinine test) within 30 days of your exam. Go to your clinic or Diagnostic Imaging Department for this test.  The MRI machine uses a strong magnet. Please wear clothes without metal (snaps, zippers). A sweatsuit works well, or we may give you a hospital gown.  Please remove any body piercings and hair extensions before you arrive. You will also remove watches, jewelry, hairpins, wallets, dentures, partial dental plates and hearing aids. You may wear contact lenses, and you may be able to wear your rings. We have a safe place to keep your personal items, but it is safer to leave them at home.   **IMPORTANT** THE INSTRUCTIONS BELOW ARE ONLY FOR THOSE PATIENTS WHO HAVE BEEN TOLD THEY WILL RECEIVE SEDATION OR GENERAL ANESTHESIA DURING THEIR MRI PROCEDURE:  IF YOU WILL RECEIVE SEDATION (take medicine to help you relax during your exam):   You must get the medicine from your doctor before you arrive. Bring the medicine to the exam. Do not take it at home.   Arrive one hour early. Bring someone who can take you home after the test. Your medicine will make you sleepy. After the exam, you may not drive, take a bus or take a taxi by yourself.   No eating 8 hours before your exam. You may have clear liquids up until 4 hours before your exam. (Clear liquids include water, clear tea, black coffee and fruit juice without pulp.)  IF YOU WILL RECEIVE ANESTHESIA (be asleep for your exam):   Arrive 1 1/2 hours early. Bring someone who can take you home after the test.  You may not drive, take a bus or take a taxi by yourself.   No eating 8 hours before your exam. You may have clear liquids up until 4 hours before your exam. (Clear liquids include water, clear tea, black coffee and fruit juice without pulp.)  Please call the Imaging Department at your exam site with any questions.            Jul 24, 2017  4:45 PM CDT   (Arrive by 4:30 PM)   MR HEAD W/O CONTRAST ANGIOGRAM with [address] [facility] ([facility])    [address]  [facility] [address] [phone]           Take your medicines as usual, unless your doctor tells you not to. Bring a list of your current medicines to your exam (including vitamins, minerals and over-the-counter drugs). Also bring the results of similar scans you may have had.  Please remove any body piercings and hair extensions before you arrive.  Follow your doctor s orders. If you do not, we may have to postpone your exam.  You will not have contrast for this exam. You do not need to do anything special to prepare.  The MRI machine uses a strong magnet. Please wear clothes without metal (snaps, zippers). A sweatsuit works well, or we may give you a hospital gown.   **IMPORTANT** THE INSTRUCTIONS BELOW ARE ONLY FOR THOSE PATIENTS WHO HAVE BEEN TOLD THEY WILL RECEIVE SEDATION OR GENERAL ANESTHESIA DURING THEIR MRI PROCEDURE:  IF YOU WILL RECEIVE SEDATION (take medicine to help you relax during your exam):   You must get the medicine from your doctor before you arrive. Bring the medicine to the exam. Do not take it at home.   Arrive one hour early. Bring someone who can take you home after the test. Your medicine will make you sleepy. After the exam, you may not drive, take a bus or take a taxi by yourself.   No eating 8 hours before your exam. You may have clear liquids up until 4 hours before your exam. (Clear liquids include water, clear tea, black coffee and fruit juice without pulp.)  IF  YOU WILL RECEIVE ANESTHESIA (be asleep for your exam):   Arrive 1 1/2 hours early. Bring someone who can take you home after the test. You may not drive, take a bus or take a taxi by yourself.   No eating 8 hours before your exam. You may have clear liquids up until 4 hours before your exam. (Clear liquids include water, clear tea, black coffee and fruit juice without pulp.)   You will spend four to five hours in the recovery room.  Please call the Imaging Department at your exam site with any questions.            Jul 24, 2017  5:30 PM CDT   (Arrive by 5:15 PM)   MR BRAIN W/O & W CONTRAST with [address] MRI ([facility])    [address] [address] [phone]           Take your medicines as usual, unless your doctor tells you not to. Bring a list of your current medicines to your exam (including vitamins, minerals and over-the-counter drugs).  You will be given intravenous contrast for this exam. To prepare:   The day before your exam, drink extra fluids at least six 8-ounce glasses (unless your doctor tells you to restrict your fluids).   Have a blood test (creatinine test) within 30 days of your exam. Go to your clinic or Diagnostic Imaging Department for this test.  The MRI machine uses a strong magnet. Please wear clothes without metal (snaps, zippers). A sweatsuit works well, or we may give you a hospital gown.  Please remove any body piercings and hair extensions before you arrive. You will also remove watches, jewelry, hairpins, wallets, dentures, partial dental plates and hearing aids. You may wear contact lenses, and you may be able to wear your rings. We have a safe place to keep your personal items, but it is safer to leave them at home.   **IMPORTANT** THE INSTRUCTIONS BELOW ARE ONLY FOR THOSE PATIENTS WHO HAVE BEEN TOLD THEY WILL RECEIVE SEDATION OR GENERAL ANESTHESIA DURING THEIR MRI PROCEDURE:  IF YOU WILL RECEIVE  SEDATION (take medicine to help you relax during your exam):   You must get the medicine from your doctor before you arrive. Bring the medicine to the exam. Do not take it at home.   Arrive one hour early. Bring someone who can take you home after the test. Your medicine will make you sleepy. After the exam, you may not drive, take a bus or take a taxi by yourself.   No eating 8 hours before your exam. You may have clear liquids up until 4 hours before your exam. (Clear liquids include water, clear tea, black coffee and fruit juice without pulp.)  IF YOU WILL RECEIVE ANESTHESIA (be asleep for your exam):   Arrive 1 1/2 hours early. Bring someone who can take you home after the test. You may not drive, take a bus or take a taxi by yourself.   No eating 8 hours before your exam. You may have clear liquids up until 4 hours before your exam. (Clear liquids include water, clear tea, black coffee and fruit juice without pulp.)  Please call the Imaging Department at your exam site with any questions.            Jul 25, 2017  9:00 AM CDT   (Arrive by 8:45 AM)   Return Stroke with Franklin Hidalgo MD   [facility] Neurology ([facility])    [address] [place] [address] [phone]            Aug 23, 2017  8:00 AM CDT   (Arrive by 7:45 AM)   Return Visit with Miguel Beauchamp MD   [facility] Physical Medicine and Rehabilitation ([facility])    [address] [place] [address] [phone]              Who to contact     Please call your clinic at [phone] to:    Ask questions about your health    Make or cancel appointments    Discuss your medicines    Learn about your test results    Speak to your doctor   If you have compliments or concerns about an experience at your clinic, or if you wish to file a complaint, please contact [facility] Patient Relations at [phone] or email us at  [email or web address]         Additional Information About Your Visit        [employer] Information     [employer] gives you secure access to your electronic health record. If you see a primary care provider, you can also send messages to your care team and make appointments. If you have questions, please call your primary care clinic.  If you do not have a primary care provider, please call [phone] and they will assist you.      [employer] is an electronic gateway that provides easy, online access to your medical records. With [employer], you can request a clinic appointment, read your test results, renew a prescription or communicate with your care team.     To access your existing account, please contact your [facility] or call [phone] for assistance.        Care EveryWhere ID     This is your Care EveryWhere ID. This could be used by other organizations to access your [facility] medical records  [id number]        Your Vitals Were     Pulse Height BMI (Body Mass Index)             80 1.829 m (6') 22.1 kg/m2          Blood Pressure from Last 3 Encounters:   07/14/17 133/81   07/13/17 126/79   07/12/17 135/82    Weight from Last 3 Encounters:   07/14/17 73.9 kg (162 lb 14.7 oz)   07/13/17 73.9 kg (162 lb 14.7 oz)   07/12/17 73.9 kg (162 lb 14.7 oz)              Today, you had the following     No orders found for display       Primary Care Provider Office Phone # Fax #    Edmond Cano PA-C [phone]       [address] [address]        Equal Access to Services     CLAUDIO CARTY : Hadii aad ku hadasho Soenochali, waaxda luqadaha, qaybta kaalmada adelillianyaarpit, paul mccormack. So [facility] [phone].    ATENCIÓN: Si habla español, tiene a naik disposición servicios gratuitos de asistencia lingüística. Llame al [phone].    We comply with applicable federal civil rights laws and  [place] laws. We do not discriminate on the basis of race, color, national origin, age, disability sex, sexual orientation or gender identity.            Thank you!     Thank you for choosing [facility] PHYSICAL MEDICINE AND REHABILITATION  for your care. Our goal is always to provide you with excellent care. Hearing back from our patients is one way we can continue to improve our services. Please take a few minutes to complete the written survey that you may receive in the mail after your visit with us. Thank you!             Your Updated Medication List - Protect others around you: Learn how to safely use, store and throw away your medicines at www.disposemymeds.org.          This list is accurate as of: 7/14/17 10:05 AM.  Always use your most recent med list.                   Brand Name Dispense Instructions for use Diagnosis    hydrOXYzine 25 MG tablet    ATARAX    60 tablet    1 to 2 tablets every 6 hours as needed for panic/anxiety    Spasticity, Insomnia, unspecified type, Panic attack       lisinopril 10 MG tablet    PRINIVIL/ZESTRIL    90 tablet    1 tab daily    Hemorrhagic stroke (H), Right hemiparesis (H), Dysarthria, Hypertension goal BP (blood pressure) < 140/90       propranolol 20 MG tablet    INDERAL    90 tablet    Take 1 tablet (20 mg) by mouth as needed Daily for panic / anxiety    Spasticity, Panic attack       traZODone 50 MG tablet    DESYREL    180 tablet    1 to 3 tablets, 30 minutes before bedtime    Insomnia, unspecified type

## 2017-07-14 NOTE — LETTER
7/14/2017       RE: Maldonado Mendiola  [address]  [facility] [address]     Dear Colleague,    Thank you for referring your patient, Maldonado Mendiola, to the [facility] PHYSICAL MEDICINE AND REHABIL[facility]ATION at [facility]. Please see a copy of my visit note below.    No adverse events.  Patient reports he was able to tie strings of his exercise shorts and also zip zipper of his pants.  RMT =45 at contralesional M1.  Treatment intensity = 45.  10 min of 6 Hz priming rtims to contralesional M1 followed by 10 min of 1 Hz principal rTMS.  Usual finger exercises.  Added turning cards.  This was difficult because of the impaired pronation and supination.  Will continue.      Again, thank you for allowing me to participate in the care of your patient.      Sincerely,    Sumeet Caballero, PT

## 2017-07-17 ENCOUNTER — OFFICE VISIT (OUTPATIENT)
Dept: PHYSICAL MEDICINE AND REHAB | Facility: CLINIC | Age: 52
End: 2017-07-17

## 2017-07-17 VITALS
BODY MASS INDEX: 22.01 KG/M2 | SYSTOLIC BLOOD PRESSURE: 132 MMHG | WEIGHT: 162.48 LBS | HEIGHT: 72 IN | DIASTOLIC BLOOD PRESSURE: 79 MMHG | HEART RATE: 88 BPM

## 2017-07-17 DIAGNOSIS — I63.89 CEREBROVASCULAR ACCIDENT (CVA) DUE TO OTHER MECHANISM (H): Primary | ICD-10-CM

## 2017-07-17 ASSESSMENT — PAIN SCALES - GENERAL: PAINLEVEL: NO PAIN (0)

## 2017-07-17 NOTE — PROGRESS NOTES
No adverse events. Switched to ipsilesional M1 for this second week.  RMT was 50, compared to 56 a week ago.  Treatment intensity = 45.  Applied 10 min of 6 Hz rTMS intermittent (5 sec on, 25 sec off) for total of 600 pulses to ipsilesional M1.  Then hand exercises.  Pronation remains a problem.  Applied estim to prontators combined with voluntary ex.  Shoulder flexion showed obvious improvement in strength.  Ate a banana with right hand rather comfortably.

## 2017-07-17 NOTE — LETTER
7/17/2017       RE: Maldonado Mendiola  [address]  [place] [address]     Dear Colleague,    Thank you for referring your patient, Maldonado Mendiola, to the [facility] PHYSICAL MEDICINE AND REHABILITATION at [facility]. Please see a copy of my visit note below.    No adverse events. Switched to ipsilesional M1 for this second week.  RMT was 50, compared to 56 a week ago.  Treatment intensity = 45.  Applied 10 min of 6 Hz rTMS intermittent (5 sec on, 25 sec off) for total of 600 pulses to ipsilesional M1.  Then hand exercises.  Pronation remains a problem.  Applied estim to prontators combined with voluntary ex.  Shoulder flexion showed obvious improvement in strength.  Ate a banana with right hand rather comfortably.    Again, thank you for allowing me to participate in the care of your patient.      Sincerely,    Sumeet Caballero, PT

## 2017-07-17 NOTE — MR AVS SNAPSHOT
After Visit Summary   7/17/2017    Maldonado Mendiola    MRN: [id number]           Patient Information     Date Of Birth          1965        Visit Information        Provider Department      7/17/2017 8:30 AM Sumeet Caballero PT [facility] Physical Medicine and Rehabilitation        Today's Diagnoses     Cerebrovascular accident (CVA) due to other mechanism (H)    -  1       Follow-ups after your visit        Your next 10 appointments already scheduled     Jul 18, 2017  8:30 AM CDT   (Arrive by 8:15 AM)   Return Visit with MADISON Santo [facility] Medicine [facility] ([facility])    [address] [place] [address]   [phone]            Jul 19, 2017  8:30 AM CDT   (Arrive by 8:15 AM)   Return Visit with MADISON Santo [facility] Physical Medicine [facility] Rehabilitation ([facility])    [address] [place] [address]   [phone]            Jul 20, 2017  8:30 AM CDT   (Arrive by 8:15 AM)   Return Visit with MADISON Santo [facility] Physical Medicine [facility] Rehabilitation ([facility])    [address] [place] [address]   [phone]            Jul 21, 2017  8:30 AM CDT   (Arrive by 8:15 AM)   Return Visit with MADISON Santo [facility] Physical Medicine [facility] Rehabilitation ([facility])    [address] [place] [address]   [phone]            Jul 24, 2017  4:00 PM CDT   (Arrive by 3:45 PM)   MR NECK W CONTRAST ANGIOGRAM with [facility] [facility] [place] [facility] ([facility])    [address] [place] [address]   [phone]           Take your medicines as usual, unless your doctor tells you not to. Bring a list of your current medicines to your exam (including vitamins, minerals and over-the-counter drugs).  You will  be given intravenous contrast for this exam. To prepare:   The day before your exam, drink extra fluids at least six 8-ounce glasses (unless your doctor tells you to restrict your fluids).   Have a blood test (creatinine test) within 30 days of your exam. Go to your clinic or Diagnostic Imaging Department for this test.  The MRI machine uses a strong magnet. Please wear clothes without metal (snaps, zippers). A sweatsuit works well, or we may give you a hospital gown.  Please remove any body piercings and hair extensions before you arrive. You will also remove watches, jewelry, hairpins, wallets, dentures, partial dental plates and hearing aids. You may wear contact lenses, and you may be able to wear your rings. We have a safe place to keep your personal items, but it is safer to leave them at home.   **IMPORTANT** THE INSTRUCTIONS BELOW ARE ONLY FOR THOSE PATIENTS WHO HAVE BEEN TOLD THEY WILL RECEIVE SEDATION OR GENERAL ANESTHESIA DURING THEIR MRI PROCEDURE:  IF YOU WILL RECEIVE SEDATION (take medicine to help you relax during your exam):   You must get the medicine from your doctor before you arrive. Bring the medicine to the exam. Do not take it at home.   Arrive one hour early. Bring someone who can take you home after the test. Your medicine will make you sleepy. After the exam, you may not drive, take a bus or take a taxi by yourself.   No eating 8 hours before your exam. You may have clear liquids up until 4 hours before your exam. (Clear liquids include water, clear tea, black coffee and fruit juice without pulp.)  IF YOU WILL RECEIVE ANESTHESIA (be asleep for your exam):   Arrive 1 1/2 hours early. Bring someone who can take you home after the test. You may not drive, take a bus or take a taxi by yourself.   No eating 8 hours before your exam. You may have clear liquids up until 4 hours before your exam. (Clear liquids include water, clear tea, black coffee and fruit juice without pulp.)  Please call the  Imaging Department at your exam site with any questions.            Jul 24, 2017  4:45 PM CDT   (Arrive by 4:30 PM)   MR HEAD W/O CONTRAST ANGIOGRAM with UCMR1   [facility] ([facility])    [address] [facility]  [address] [facility] [id number] [phone]           Take your medicines as usual, unless your doctor tells you not to. Bring a list of your current medicines to your exam (including vitamins, minerals and over-the-counter drugs). Also bring the results of similar scans you may have had.  Please remove any body piercings and hair extensions before you arrive.  Follow your doctor s orders. If you do not, we may have to postpone your exam.  You will not have contrast for this exam. You do not need to do anything special to prepare.  The MRI machine uses a strong magnet. Please wear clothes without metal (snaps, zippers). A sweatsuit works well, or we may give you a hospital gown.   **IMPORTANT** THE INSTRUCTIONS BELOW ARE ONLY FOR THOSE PATIENTS WHO HAVE BEEN TOLD THEY WILL RECEIVE SEDATION OR GENERAL ANESTHESIA DURING THEIR MRI PROCEDURE:  IF YOU WILL RECEIVE SEDATION (take medicine to help you relax during your exam):   You must get the medicine from your doctor before you arrive. Bring the medicine to the exam. Do not take it at home.   Arrive one hour early. Bring someone who can take you home after the test. Your medicine will make you sleepy. After the exam, you may not drive, take a bus or take a taxi by yourself.   No eating 8 hours before your exam. You may have clear liquids up until 4 hours before your exam. (Clear liquids include water, clear tea, black coffee and fruit juice without pulp.)  IF YOU WILL RECEIVE ANESTHESIA (be asleep for your exam):   Arrive 1 1/2 hours early. Bring someone who can take you home after the test. You may not drive, take a bus or take a taxi by yourself.   No eating 8 hours before your exam. You may have clear liquids  up until 4 hours before your exam. (Clear liquids include water, clear tea, black coffee and fruit juice without pulp.)   You will spend four to five hours in the recovery room.  Please call the Imaging Department at your exam site with any questions.            Jul 24, 2017  5:30 PM CDT   (Arrive by 5:15 PM)   MR BRAIN W/O & W CONTRAST with [address] MRI ([facility])    [address] [address] [phone]           Take your medicines as usual, unless your doctor tells you not to. Bring a list of your current medicines to your exam (including vitamins, minerals and over-the-counter drugs).  You will be given intravenous contrast for this exam. To prepare:   The day before your exam, drink extra fluids at least six 8-ounce glasses (unless your doctor tells you to restrict your fluids).   Have a blood test (creatinine test) within 30 days of your exam. Go to your clinic or Diagnostic Imaging Department for this test.  The MRI machine uses a strong magnet. Please wear clothes without metal (snaps, zippers). A sweatsuit works well, or we may give you a hospital gown.  Please remove any body piercings and hair extensions before you arrive. You will also remove watches, jewelry, hairpins, wallets, dentures, partial dental plates and hearing aids. You may wear contact lenses, and you may be able to wear your rings. We have a safe place to keep your personal items, but it is safer to leave them at home.   **IMPORTANT** THE INSTRUCTIONS BELOW ARE ONLY FOR THOSE PATIENTS WHO HAVE BEEN TOLD THEY WILL RECEIVE SEDATION OR GENERAL ANESTHESIA DURING THEIR MRI PROCEDURE:  IF YOU WILL RECEIVE SEDATION (take medicine to help you relax during your exam):   You must get the medicine from your doctor before you arrive. Bring the medicine to the exam. Do not take it at home.   Arrive one hour early. Bring someone who can take you home after the test.  Your medicine will make you sleepy. After the exam, you may not drive, take a bus or take a taxi by yourself.   No eating 8 hours before your exam. You may have clear liquids up until 4 hours before your exam. (Clear liquids include water, clear tea, black coffee and fruit juice without pulp.)  IF YOU WILL RECEIVE ANESTHESIA (be asleep for your exam):   Arrive 1 1/2 hours early. Bring someone who can take you home after the test. You may not drive, take a bus or take a taxi by yourself.   No eating 8 hours before your exam. You may have clear liquids up until 4 hours before your exam. (Clear liquids include water, clear tea, black coffee and fruit juice without pulp.)  Please call the Imaging Department at your exam site with any questions.            Jul 25, 2017  9:00 AM CDT   (Arrive by 8:45 AM)   Return Stroke with Franklin Hidalgo MD   [facility] Neurology ([facility])    [address] [place] [address] [phone]            Aug 23, 2017  8:00 AM CDT   (Arrive by 7:45 AM)   Return Visit with Miguel Beauchamp MD   [facility] Physical Medicine and Rehabilitation ([facility])    [address] [place] [address] [phone]              Who to contact     Please call your clinic at [phone] to:    Ask questions about your health    Make or cancel appointments    Discuss your medicines    Learn about your test results    Speak to your doctor   If you have compliments or concerns about an experience at your clinic, or if you wish to file a complaint, please contact [facility] Patient Relations at [phone] or email us at [email or web address]         Additional Information About Your Visit        MyChart Information     [employer] gives you secure access to your electronic health record. If you see a primary care provider, you can also send messages to your care  team and make appointments. If you have questions, please call your primary care clinic.  If you do not have a primary care provider, please call [phone] and they will assist you.      [employer] is an electronic gateway that provides easy, online access to your medical records. With [employer], you can request a clinic appointment, read your test results, renew a prescription or communicate with your care team.     To access your existing account, please contact your [facility] or call [phone] for assistance.        Care EveryWhere ID     This is your Care EveryWhere ID. This could be used by other organizations to access your [facility] medical records  [id number]        Your Vitals Were     Pulse Height BMI (Body Mass Index)             88 1.829 m (6') 22.04 kg/m2          Blood Pressure from Last 3 Encounters:   07/17/17 132/79   07/14/17 133/81   07/13/17 126/79    Weight from Last 3 Encounters:   07/17/17 73.7 kg (162 lb 7.7 oz)   07/14/17 73.9 kg (162 lb 14.7 oz)   07/13/17 73.9 kg (162 lb 14.7 oz)              Today, you had the following     No orders found for display       Primary Care Provider Office Phone # Fax #    Edmond Cano PA-C [phone]       [address] [address]        Equal Access to Services     [facility] : Hadii aad ku hadasho Soomaali, waaxda luqadaha, qaybta kaalmada adeegyada, paul henriquez . So [facility] [phone].    ATENCIÓN: Si habla español, tiene a naik disposición servicios gratuitos de asistencia lingüística. Llame al [phone].    We comply with applicable federal civil rights laws and [place] laws. We do not discriminate on the basis of race, color, national origin, age, disability sex, sexual orientation or gender identity.            Thank you!     Thank you for choosing [facility] PHYSICAL MEDICINE AND REHABILITATION  for your care. Our  goal is always to provide you with excellent care. Hearing back from our patients is one way we can continue to improve our services. Please take a few minutes to complete the written survey that you may receive in the mail after your visit with us. Thank you!             Your Updated Medication List - Protect others around you: Learn how to safely use, store and throw away your medicines at www.disposemymeds.org.          This list is accurate as of: 7/17/17 10:06 AM.  Always use your most recent med list.                   Brand Name Dispense Instructions for use Diagnosis    hydrOXYzine 25 MG tablet    ATARAX    60 tablet    1 to 2 tablets every 6 hours as needed for panic/anxiety    Spasticity, Insomnia, unspecified type, Panic attack       lisinopril 10 MG tablet    PRINIVIL/ZESTRIL    90 tablet    1 tab daily    Hemorrhagic stroke (H), Right hemiparesis (H), Dysarthria, Hypertension goal BP (blood pressure) < 140/90       propranolol 20 MG tablet    INDERAL    90 tablet    Take 1 tablet (20 mg) by mouth as needed Daily for panic / anxiety    Spasticity, Panic attack       traZODone 50 MG tablet    DESYREL    180 tablet    1 to 3 tablets, 30 minutes before bedtime    Insomnia, unspecified type

## 2017-07-18 ENCOUNTER — OFFICE VISIT (OUTPATIENT)
Dept: PHYSICAL MEDICINE AND REHAB | Facility: CLINIC | Age: 52
End: 2017-07-18

## 2017-07-18 DIAGNOSIS — I63.89 CEREBROVASCULAR ACCIDENT (CVA) DUE TO OTHER MECHANISM (H): Primary | ICD-10-CM

## 2017-07-18 NOTE — MR AVS SNAPSHOT
After Visit Summary   7/18/2017    Maldonado Mendiola    MRN: [id number]           Patient Information     Date Of Birth          1965        Visit Information        Provider Department      7/18/2017 8:30 AM Sumeet Caballero PT [facility] Physical Medicine and Rehabilitation        Today's Diagnoses     Cerebrovascular accident (CVA) due to other mechanism (H)    -  1       Follow-ups after your visit        Your next 10 appointments already scheduled     Jul 19, 2017  8:30 AM CDT   (Arrive by 8:15 AM)   Return Visit with MADISON Santo [facility] Physical Medicine and Rehabilitation ([facility])    [address] [place] [id number]   [phone]            Jul 20, 2017  8:30 AM CDT   (Arrive by 8:15 AM)   Return Visit with MADISON Santo [facility] Physical Medicine [facility] Rehabilitation ([facility])    [address] [place] [id number]   [phone]            Jul 21, 2017  8:30 AM CDT   (Arrive by 8:15 AM)   Return Visit with MADISON Santo [facility] Physical Medicine and Rehabilitation ([facility])    [address] [place] [address]   [phone]            Jul 24, 2017  4:00 PM CDT   (Arrive by 3:45 PM)   MR NECK W CONTRAST ANGIOGRAM with [facility] [facility] ([facility])    [address] [place] [id number]   [phone]           Take your medicines as usual, unless your doctor tells you not to. Bring a list of your current medicines to your exam (including vitamins, minerals and over-the-counter drugs).  You will be given intravenous contrast for this exam. To prepare:   The day before your exam, drink extra fluids at least six 8-ounce glasses (unless your doctor tells you to restrict your fluids).   Have a blood test (creatinine test) within 30 days of your exam. Go  to your clinic or Diagnostic Imaging Department for this test.  The MRI machine uses a strong magnet. Please wear clothes without metal (snaps, zippers). A sweatsuit works well, or we may give you a hospital gown.  Please remove any body piercings and hair extensions before you arrive. You will also remove watches, jewelry, hairpins, wallets, dentures, partial dental plates and hearing aids. You may wear contact lenses, and you may be able to wear your rings. We have a safe place to keep your personal items, but it is safer to leave them at home.   **IMPORTANT** THE INSTRUCTIONS BELOW ARE ONLY FOR THOSE PATIENTS WHO HAVE BEEN TOLD THEY WILL RECEIVE SEDATION OR GENERAL ANESTHESIA DURING THEIR MRI PROCEDURE:  IF YOU WILL RECEIVE SEDATION (take medicine to help you relax during your exam):   You must get the medicine from your doctor before you arrive. Bring the medicine to the exam. Do not take it at home.   Arrive one hour early. Bring someone who can take you home after the test. Your medicine will make you sleepy. After the exam, you may not drive, take a bus or take a taxi by yourself.   No eating 8 hours before your exam. You may have clear liquids up until 4 hours before your exam. (Clear liquids include water, clear tea, black coffee and fruit juice without pulp.)  IF YOU WILL RECEIVE ANESTHESIA (be asleep for your exam):   Arrive 1 1/2 hours early. Bring someone who can take you home after the test. You may not drive, take a bus or take a taxi by yourself.   No eating 8 hours before your exam. You may have clear liquids up until 4 hours before your exam. (Clear liquids include water, clear tea, black coffee and fruit juice without pulp.)  Please call the Imaging Department at your exam site with any questions.            Jul 24, 2017  4:45 PM CDT   (Arrive by 4:30 PM)   MR HEAD W/O CONTRAST ANGIOGRAM with [address] [facility] ([facility])    [address]  [facility] [address] [phone]           Take your medicines as usual, unless your doctor tells you not to. Bring a list of your current medicines to your exam (including vitamins, minerals and over-the-counter drugs). Also bring the results of similar scans you may have had.  Please remove any body piercings and hair extensions before you arrive.  Follow your doctor s orders. If you do not, we may have to postpone your exam.  You will not have contrast for this exam. You do not need to do anything special to prepare.  The MRI machine uses a strong magnet. Please wear clothes without metal (snaps, zippers). A sweatsuit works well, or we may give you a hospital gown.   **IMPORTANT** THE INSTRUCTIONS BELOW ARE ONLY FOR THOSE PATIENTS WHO HAVE BEEN TOLD THEY WILL RECEIVE SEDATION OR GENERAL ANESTHESIA DURING THEIR MRI PROCEDURE:  IF YOU WILL RECEIVE SEDATION (take medicine to help you relax during your exam):   You must get the medicine from your doctor before you arrive. Bring the medicine to the exam. Do not take it at home.   Arrive one hour early. Bring someone who can take you home after the test. Your medicine will make you sleepy. After the exam, you may not drive, take a bus or take a taxi by yourself.   No eating 8 hours before your exam. You may have clear liquids up until 4 hours before your exam. (Clear liquids include water, clear tea, black coffee and fruit juice without pulp.)  IF YOU WILL RECEIVE ANESTHESIA (be asleep for your exam):   Arrive 1 1/2 hours early. Bring someone who can take you home after the test. You may not drive, take a bus or take a taxi by yourself.   No eating 8 hours before your exam. You may have clear liquids up until 4 hours before your exam. (Clear liquids include water, clear tea, black coffee and fruit juice without pulp.)   You will spend four to five hours in the recovery room.  Please call the Imaging Department at your exam site with any questions.             Jul 24, 2017  5:30 PM CDT   (Arrive by 5:15 PM)   MR BRAIN W/O & W CONTRAST with [address] [facility] ([facility])    [address] [facility]  [address] [facility] [address] [phone]           Take your medicines as usual, unless your doctor tells you not to. Bring a list of your current medicines to your exam (including vitamins, minerals and over-the-counter drugs).  You will be given intravenous contrast for this exam. To prepare:   The day before your exam, drink extra fluids at least six 8-ounce glasses (unless your doctor tells you to restrict your fluids).   Have a blood test (creatinine test) within 30 days of your exam. Go to your clinic or Diagnostic Imaging Department for this test.  The MRI machine uses a strong magnet. Please wear clothes without metal (snaps, zippers). A sweatsuit works well, or we may give you a hospital gown.  Please remove any body piercings and hair extensions before you arrive. You will also remove watches, jewelry, hairpins, wallets, dentures, partial dental plates and hearing aids. You may wear contact lenses, and you may be able to wear your rings. We have a safe place to keep your personal items, but it is safer to leave them at home.   **IMPORTANT** THE INSTRUCTIONS BELOW ARE ONLY FOR THOSE PATIENTS WHO HAVE BEEN TOLD THEY WILL RECEIVE SEDATION OR GENERAL ANESTHESIA DURING THEIR MRI PROCEDURE:  IF YOU WILL RECEIVE SEDATION (take medicine to help you relax during your exam):   You must get the medicine from your doctor before you arrive. Bring the medicine to the exam. Do not take it at home.   Arrive one hour early. Bring someone who can take you home after the test. Your medicine will make you sleepy. After the exam, you may not drive, take a bus or take a taxi by yourself.   No eating 8 hours before your exam. You may have clear liquids up until 4 hours before your exam. (Clear liquids include water, clear tea, black  coffee and fruit juice without pulp.)  IF YOU WILL RECEIVE ANESTHESIA (be asleep for your exam):   Arrive 1 1/2 hours early. Bring someone who can take you home after the test. You may not drive, take a bus or take a taxi by yourself.   No eating 8 hours before your exam. You may have clear liquids up until 4 hours before your exam. (Clear liquids include water, clear tea, black coffee and fruit juice without pulp.)  Please call the Imaging Department at your exam site with any questions.            Jul 25, 2017  9:00 AM CDT   (Arrive by 8:45 AM)   Return Stroke with Franklin Hidalgo MD   [facility] ([facility])    [address] [place] [address] [phone]            Aug 23, 2017  8:00 AM CDT   (Arrive by 7:45 AM)   Return Visit with Miguel Beauchamp MD   [facility] ([facility])    [address] [place] [address] [phone]              Who to contact     Please call your clinic at [phone] to:    Ask questions about your health    Make or cancel appointments    Discuss your medicines    Learn about your test results    Speak to your doctor   If you have compliments or concerns about an experience at your clinic, or if you wish to file a complaint, please contact [facility] Patient Relations at [phone] or email us at [email or web address]         Additional Information About Your Visit        [employer] Information     [employer] gives you secure access to your electronic health record. If you see a primary care provider, you can also send messages to your care team and make appointments. If you have questions, please call your primary care clinic.  If you do not have a primary care provider, please call [phone] and they will assist you.      [employer] is an electronic gateway that provides easy, online access to  your medical records. With [employer], you can request a clinic appointment, read your test results, renew a prescription or communicate with your care team.     To access your existing account, please contact your [facility] or call [phone] for assistance.        Care EveryWhere ID     This is your Care EveryWhere ID. This could be used by other organizations to access your [facility] medical records  [id number]         Blood Pressure from Last 3 Encounters:   07/17/17 132/79   07/14/17 133/81   07/13/17 126/79    Weight from Last 3 Encounters:   07/17/17 73.7 kg (162 lb 7.7 oz)   07/14/17 73.9 kg (162 lb 14.7 oz)   07/13/17 73.9 kg (162 lb 14.7 oz)              Today, you had the following     No orders found for display       Primary Care Provider Office Phone # Fax #    Edmond Cano PA-C [phone]       [address] [address]        Equal Access to Services     [facility] CenterVIKASH : Hadii aad ku hadasho Soomaali, waaxda luqadaha, qaybta kaalmada adeegyada, waxay avaniin hayjenniffer henriquez . So [facility] [phone].    ATENCIÓN: Si habla español, tiene a naik disposición servicios gratuitos de asistencia lingüística. Edeame al [phone].    We comply with applicable federal civil rights laws and [place] laws. We do not discriminate on the basis of race, color, national origin, age, disability sex, sexual orientation or gender identity.            Thank you!     Thank you for choosing [facility] PHYSICAL MEDICINE AND REHABILITATION  for your care. Our goal is always to provide you with excellent care. Hearing back from our patients is one way we can continue to improve our services. Please take a few minutes to complete the written survey that you may receive in the mail after your visit with us. Thank you!             Your Updated Medication List - Protect others around you: Learn how to safely use, store and  throw away your medicines at www.disposemymeds.org.          This list is accurate as of: 7/18/17 10:12 AM.  Always use your most recent med list.                   Brand Name Dispense Instructions for use Diagnosis    hydrOXYzine 25 MG tablet    ATARAX    60 tablet    1 to 2 tablets every 6 hours as needed for panic/anxiety    Spasticity, Insomnia, unspecified type, Panic attack       lisinopril 10 MG tablet    PRINIVIL/ZESTRIL    90 tablet    1 tab daily    Hemorrhagic stroke (H), Right hemiparesis (H), Dysarthria, Hypertension goal BP (blood pressure) < 140/90       propranolol 20 MG tablet    INDERAL    90 tablet    Take 1 tablet (20 mg) by mouth as needed Daily for panic / anxiety    Spasticity, Panic attack       traZODone 50 MG tablet    DESYREL    180 tablet    1 to 3 tablets, 30 minutes before bedtime    Insomnia, unspecified type

## 2017-07-18 NOTE — LETTER
7/18/2017       RE: Maldonado Mendiola  [address]  [facility] [address]     Dear Colleague,    Thank you for referring your patient, Maldonado Mendiola, to the [facility] PH[facility]ICAL MEDICINE AND REHABILITATION at [facility]. Please see a copy of my visit note below.    No adverse events.  RMT at ipsilesional M1=48 today.  Treatment intensity there=43.  Received 10 minutes of 6 Hz intermittent rTMS to ipsilesional M1.   Then practiced eating an apple, which was successful.  Did pronation and supination with computer.  Combining supination with elbow flexion and pronation with elbow extension improved performance.  Then combined finger extension to release an object with elbow extension and pronation.  This mass pattern seemed to help his grasp and release.  Did finger tracking and noted much improvement in performance.    Again, thank you for allowing me to participate in the care of your patient.      Sincerely,    Sumeet Caballero, PT

## 2017-07-18 NOTE — PROGRESS NOTES
No adverse events.  RMT at ipsilesional M1=48 today.  Treatment intensity there=43.  Received 10 minutes of 6 Hz intermittent rTMS to ipsilesional M1.   Then practiced eating an apple, which was successful.  Did pronation and supination with computer.  Combining supination with elbow flexion and pronation with elbow extension improved performance.  Then combined finger extension to release an object with elbow extension and pronation.  This mass pattern seemed to help his grasp and release.  Did finger tracking and noted much improvement in performance.

## 2017-07-19 ENCOUNTER — OFFICE VISIT (OUTPATIENT)
Dept: PHYSICAL MEDICINE AND REHAB | Facility: CLINIC | Age: 52
End: 2017-07-19

## 2017-07-19 VITALS — SYSTOLIC BLOOD PRESSURE: 149 MMHG | DIASTOLIC BLOOD PRESSURE: 86 MMHG | HEART RATE: 81 BPM | HEIGHT: 72 IN

## 2017-07-19 DIAGNOSIS — I63.89 CEREBROVASCULAR ACCIDENT (CVA) DUE TO OTHER MECHANISM (H): Primary | ICD-10-CM

## 2017-07-19 NOTE — MR AVS SNAPSHOT
After Visit Summary   7/19/2017    Maldonado Mendiola    MRN: [id number]           Patient Information     Date Of Birth          1965        Visit Information        Provider Department      7/19/2017 8:30 AM Sumeet Caballero PT [facility] Physical Medicine and Rehabilitation        Today's Diagnoses     Cerebrovascular accident (CVA) due to other mechanism (H)    -  1       Follow-ups after your visit        Your next 10 appointments already scheduled     Jul 20, 2017  8:30 AM CDT   (Arrive by 8:15 AM)   Return Visit with MADISON [facility] Physical Medicine and Rehabilitation ([facility])    [address] [place] [address]   [phone]            Jul 21, 2017  8:30 AM CDT   (Arrive by 8:15 AM)   Return Visit with MADISON [facility] Physical Medicine and Rehabilitation ([facility])    [address] [place] [address]   [phone]            Jul 24, 2017  4:00 PM CDT   (Arrive by 3:45 PM)   MR NECK W CONTRAST ANGIOGRAM with [address] MRI ([facility])    [address] [place] [address]   [phone]           Take your medicines as usual, unless your doctor tells you not to. Bring a list of your current medicines to your exam (including vitamins, minerals and over-the-counter drugs).  You will be given intravenous contrast for this exam. To prepare:   The day before your exam, drink extra fluids at least six 8-ounce glasses (unless your doctor tells you to restrict your fluids).   Have a blood test (creatinine test) within 30 days of your exam. Go to your clinic or Diagnostic Imaging Department for this test.  The MRI machine uses a strong magnet. Please wear clothes without metal (snaps, zippers). A sweatsuit works well, or we may give you a hospital gown.  Please remove any body piercings and hair  extensions before you arrive. You will also remove watches, jewelry, hairpins, wallets, dentures, partial dental plates and hearing aids. You may wear contact lenses, and you may be able to wear your rings. We have a safe place to keep your personal items, but it is safer to leave them at home.   **IMPORTANT** THE INSTRUCTIONS BELOW ARE ONLY FOR THOSE PATIENTS WHO HAVE BEEN TOLD THEY WILL RECEIVE SEDATION OR GENERAL ANESTHESIA DURING THEIR MRI PROCEDURE:  IF YOU WILL RECEIVE SEDATION (take medicine to help you relax during your exam):   You must get the medicine from your doctor before you arrive. Bring the medicine to the exam. Do not take it at home.   Arrive one hour early. Bring someone who can take you home after the test. Your medicine will make you sleepy. After the exam, you may not drive, take a bus or take a taxi by yourself.   No eating 8 hours before your exam. You may have clear liquids up until 4 hours before your exam. (Clear liquids include water, clear tea, black coffee and fruit juice without pulp.)  IF YOU WILL RECEIVE ANESTHESIA (be asleep for your exam):   Arrive 1 1/2 hours early. Bring someone who can take you home after the test. You may not drive, take a bus or take a taxi by yourself.   No eating 8 hours before your exam. You may have clear liquids up until 4 hours before your exam. (Clear liquids include water, clear tea, black coffee and fruit juice without pulp.)  Please call the Imaging Department at your exam site with any questions.            Jul 24, 2017  4:45 PM CDT   (Arrive by 4:30 PM)   MR HEAD W/O CONTRAST ANGIOGRAM with [address] [facility] ([facility])    [address] [address] [phone]           Take your medicines as usual, unless your doctor tells you not to. Bring a list of your current medicines to your exam (including vitamins, minerals and over-the-counter drugs). Also bring the results  of similar scans you may have had.  Please remove any body piercings and hair extensions before you arrive.  Follow your doctor s orders. If you do not, we may have to postpone your exam.  You will not have contrast for this exam. You do not need to do anything special to prepare.  The MRI machine uses a strong magnet. Please wear clothes without metal (snaps, zippers). A sweatsuit works well, or we may give you a hospital gown.   **IMPORTANT** THE INSTRUCTIONS BELOW ARE ONLY FOR THOSE PATIENTS WHO HAVE BEEN TOLD THEY WILL RECEIVE SEDATION OR GENERAL ANESTHESIA DURING THEIR MRI PROCEDURE:  IF YOU WILL RECEIVE SEDATION (take medicine to help you relax during your exam):   You must get the medicine from your doctor before you arrive. Bring the medicine to the exam. Do not take it at home.   Arrive one hour early. Bring someone who can take you home after the test. Your medicine will make you sleepy. After the exam, you may not drive, take a bus or take a taxi by yourself.   No eating 8 hours before your exam. You may have clear liquids up until 4 hours before your exam. (Clear liquids include water, clear tea, black coffee and fruit juice without pulp.)  IF YOU WILL RECEIVE ANESTHESIA (be asleep for your exam):   Arrive 1 1/2 hours early. Bring someone who can take you home after the test. You may not drive, take a bus or take a taxi by yourself.   No eating 8 hours before your exam. You may have clear liquids up until 4 hours before your exam. (Clear liquids include water, clear tea, black coffee and fruit juice without pulp.)   You will spend four to five hours in the recovery room.  Please call the Imaging Department at your exam site with any questions.            Jul 24, 2017  5:30 PM CDT   (Arrive by 5:15 PM)   MR BRAIN W/O & W CONTRAST with [address] [facility] ([facility])    [address] [address] [phone]           Take your  medicines as usual, unless your doctor tells you not to. Bring a list of your current medicines to your exam (including vitamins, minerals and over-the-counter drugs).  You will be given intravenous contrast for this exam. To prepare:   The day before your exam, drink extra fluids at least six 8-ounce glasses (unless your doctor tells you to restrict your fluids).   Have a blood test (creatinine test) within 30 days of your exam. Go to your clinic or Diagnostic Imaging Department for this test.  The MRI machine uses a strong magnet. Please wear clothes without metal (snaps, zippers). A sweatsuit works well, or we may give you a hospital gown.  Please remove any body piercings and hair extensions before you arrive. You will also remove watches, jewelry, hairpins, wallets, dentures, partial dental plates and hearing aids. You may wear contact lenses, and you may be able to wear your rings. We have a safe place to keep your personal items, but it is safer to leave them at home.   **IMPORTANT** THE INSTRUCTIONS BELOW ARE ONLY FOR THOSE PATIENTS WHO HAVE BEEN TOLD THEY WILL RECEIVE SEDATION OR GENERAL ANESTHESIA DURING THEIR MRI PROCEDURE:  IF YOU WILL RECEIVE SEDATION (take medicine to help you relax during your exam):   You must get the medicine from your doctor before you arrive. Bring the medicine to the exam. Do not take it at home.   Arrive one hour early. Bring someone who can take you home after the test. Your medicine will make you sleepy. After the exam, you may not drive, take a bus or take a taxi by yourself.   No eating 8 hours before your exam. You may have clear liquids up until 4 hours before your exam. (Clear liquids include water, clear tea, black coffee and fruit juice without pulp.)  IF YOU WILL RECEIVE ANESTHESIA (be asleep for your exam):   Arrive 1 1/2 hours early. Bring someone who can take you home after the test. You may not drive, take a bus or take a taxi by yourself.   No eating 8 hours  before your exam. You may have clear liquids up until 4 hours before your exam. (Clear liquids include water, clear tea, black coffee and fruit juice without pulp.)  Please call the Imaging Department at your exam site with any questions.            Jul 25, 2017  9:00 AM CDT   (Arrive by 8:45 AM)   Return Stroke with Franklin Hidalgo MD   [facility] Neurology ([facility])    [address] [place] [address] [phone]            Aug 23, 2017  8:00 AM CDT   (Arrive by 7:45 AM)   Return Visit with Miguel Beauchamp MD   [facility] Physical Medicine and Rehabilitation ([facility])    [address] [address] [phone]              Who to contact     Please call your clinic at [phone] to:    Ask questions about your health    Make or cancel appointments    Discuss your medicines    Learn about your test results    Speak to your doctor   If you have compliments or concerns about an experience at your clinic, or if you wish to file a complaint, please contact [facility] Patient Relations at [phone] or email us at [email or web address]         Additional Information About Your Visit        [employer] Information     [employer] gives you secure access to your electronic health record. If you see a primary care provider, you can also send messages to your care team and make appointments. If you have questions, please call your primary care clinic.  If you do not have a primary care provider, please call [phone] and they will assist you.      [employer] is an electronic gateway that provides easy, online access to your medical records. With [employer], you can request a clinic appointment, read your test results, renew a prescription or communicate with your care team.     To access your existing account, please contact your [facility]  or call [phone] for assistance.        Care EveryWhere ID     This is your Care EveryWhere ID. This could be used by other organizations to access your [place] medical records  [id number]        Your Vitals Were     Pulse Height                81 1.829 m (6')           Blood Pressure from Last 3 Encounters:   07/19/17 149/86   07/17/17 132/79   07/14/17 133/81    Weight from Last 3 Encounters:   07/17/17 73.7 kg (162 lb 7.7 oz)   07/14/17 73.9 kg (162 lb 14.7 oz)   07/13/17 73.9 kg (162 lb 14.7 oz)              Today, you had the following     No orders found for display       Primary Care Provider Office Phone # Fax #    Edmond Cano PA-C [phone]       [address] [address]        Equal Access to Services     CLAUDIO CARTY : Hadii aad ku hadasho Soomaali, waaxda luqadaha, qaybta kaalmada adeegyada, waxay avaniin haysrinathn goyo henriquez . So [facility] [phone].    ATENCIÓN: Si kennyla colton, tiene a naik disposición servicios gratuitos de asistencia lingüística. Llame al [phone].    We comply with applicable federal civil rights laws and [place] laws. We do not discriminate on the basis of race, color, national origin, age, disability sex, sexual orientation or gender identity.            Thank you!     Thank you for choosing [facility] PHYSICAL MEDICINE AND REHABILITATION  for your care. Our goal is always to provide you with excellent care. Hearing back from our patients is one way we can continue to improve our services. Please take a few minutes to complete the written survey that you may receive in the mail after your visit with us. Thank you!             Your Updated Medication List - Protect others around you: Learn how to safely use, store and throw away your medicines at www.disposemymeds.org.          This list is accurate as of: 7/19/17 10:14 AM.  Always use your most recent med list.                   Brand Name  Dispense Instructions for use Diagnosis    hydrOXYzine 25 MG tablet    ATARAX    60 tablet    1 to 2 tablets every 6 hours as needed for panic/anxiety    Spasticity, Insomnia, unspecified type, Panic attack       lisinopril 10 MG tablet    PRINIVIL/ZESTRIL    90 tablet    1 tab daily    Hemorrhagic stroke (H), Right hemiparesis (H), Dysarthria, Hypertension goal BP (blood pressure) < 140/90       propranolol 20 MG tablet    INDERAL    90 tablet    Take 1 tablet (20 mg) by mouth as needed Daily for panic / anxiety    Spasticity, Panic attack       traZODone 50 MG tablet    DESYREL    180 tablet    1 to 3 tablets, 30 minutes before bedtime    Insomnia, unspecified type

## 2017-07-19 NOTE — LETTER
"7/19/2017       RE: Maldonado Mendiola  [address] [facility] [address]     Dear Colleague,    Thank you for referring your patient, Maldonado Mendiola, to the [facility] PHYSICAL MEDICINE AND REHABILITATION at [facility]. Please see a copy of my visit note below.    No adverse events.  Patient reports use of hand at home to be more \"nimble\".  RMT at ipsilesional M1=50 today.  Intensity for treatment=45.  Received 15 minutes, not 10 anymore, of intermittent 6 Hz rTMS to ipsilesional M1.  Practiced tying bow of swim trunks.  This is difficult but reasonable.  Keyboard skills not reasonable at this time.  Practiced various skills on [employer].  Also did tracking and shoulder strengthening.    Again, thank you for allowing me to participate in the care of your patient.      Sincerely,    Sumeet Caballero, PT      "

## 2017-07-19 NOTE — PROGRESS NOTES
"No adverse events.  Patient reports use of hand at home to be more \"nimble\".  RMT at ipsilesional M1=50 today.  Intensity for treatment=45.  Received 15 minutes, not 10 anymore, of intermittent 6 Hz rTMS to ipsilesional M1.  Practiced tying bow of swim trunks.  This is difficult but reasonable.  Keyboard skills not reasonable at this time.  Practiced various skills on [employer].  Also did tracking and shoulder strengthening.  "

## 2017-07-20 ENCOUNTER — OFFICE VISIT (OUTPATIENT)
Dept: PHYSICAL MEDICINE AND REHAB | Facility: CLINIC | Age: 52
End: 2017-07-20

## 2017-07-20 ENCOUNTER — TELEPHONE (OUTPATIENT)
Dept: NEUROLOGY | Facility: CLINIC | Age: 52
End: 2017-07-20

## 2017-07-20 VITALS
HEART RATE: 89 BPM | BODY MASS INDEX: 22.29 KG/M2 | HEIGHT: 72 IN | WEIGHT: 164.6 LBS | SYSTOLIC BLOOD PRESSURE: 123 MMHG | DIASTOLIC BLOOD PRESSURE: 70 MMHG

## 2017-07-20 DIAGNOSIS — I63.89 CEREBROVASCULAR ACCIDENT (CVA) DUE TO OTHER MECHANISM (H): Primary | ICD-10-CM

## 2017-07-20 ASSESSMENT — PAIN SCALES - GENERAL: PAINLEVEL: NO PAIN (0)

## 2017-07-20 NOTE — LETTER
7/20/2017       RE: Maldonado Mendiola  [address]  [place] [facility] [address]     Dear Colleague,    Thank you for referring your patient, Maldonado Mendiola, to the [facility] PHYSICAL MEDICINE AND REHABILITATION at [facility]. Please see a copy of my visit note below.    No adverse events.  RMT at ipsilesional M1=48.  Treatment intensity=43.  15 minutes of intermittent 6 Hz rTMS to ipsilesional M1.  Then pro/sup, wrist and finger ex.  Pincer  between index and thumb is improved.  Pronation still difficult.  Bringing walnuts to chin is much improved.    Again, thank you for allowing me to participate in the care of your patient.      Sincerely,    Sumeet Caballero, PT

## 2017-07-20 NOTE — TELEPHONE ENCOUNTER
----- Message from Anais Kemp sent at 7/19/2017 12:09 PM CDT -----  Regarding: Authorization for MRI  Contact: [phone]  Pt is requesting a call back to discuss his MRI authorization. Please call pt back to discuss.    Thank you!    KI    Please DO NOT send this message and/or reply back to sender.  Call Center Representatives DO NOT respond to messages.

## 2017-07-20 NOTE — PROGRESS NOTES
No adverse events.  RMT at ipsilesional M1=48.  Treatment intensity=43.  15 minutes of intermittent 6 Hz rTMS to ipsilesional M1.  Then pro/sup, wrist and finger ex.  Pincer  between index and thumb is improved.  Pronation still difficult.  Bringing walnuts to chin is much improved.

## 2017-07-20 NOTE — MR AVS SNAPSHOT
After Visit Summary   7/20/2017    Maldonado Mendiola    MRN: [id number]           Patient Information     Date Of Birth          1965        Visit Information        Provider Department      7/20/2017 8:30 AM Sumeet Caballero PT [facility] Physical Medicine and Rehabilitation        Today's Diagnoses     Cerebrovascular accident (CVA) due to other mechanism (H)    -  1       Follow-ups after your visit        Your next 10 appointments already scheduled     Jul 21, 2017  8:30 AM CDT   (Arrive by 8:15 AM)   Return Visit with [facility] Medicine [facility] ([facility])    [address] [place] [id number]   [phone]            Jul 24, 2017  4:00 PM CDT   (Arrive by 3:45 PM)   MR NECK W CONTRAST ANGIOGRAM with [address] MRI ([facility])    [address] [place] [id number] [phone]           Take your medicines as usual, unless your doctor tells you not to. Bring a list of your current medicines to your exam (including vitamins, minerals and over-the-counter drugs).  You will be given intravenous contrast for this exam. To prepare:   The day before your exam, drink extra fluids at least six 8-ounce glasses (unless your doctor tells you to restrict your fluids).   Have a blood test (creatinine test) within 30 days of your exam. Go to your clinic or Diagnostic Imaging Department for this test.  The MRI machine uses a strong magnet. Please wear clothes without metal (snaps, zippers). A sweatsuit works well, or we may give you a hospital gown.  Please remove any body piercings and hair extensions before you arrive. You will also remove watches, jewelry, hairpins, wallets, dentures, partial dental plates and hearing aids. You may wear contact lenses, and you may be able to wear your rings. We have a safe place to keep your personal items, but it  is safer to leave them at home.   **IMPORTANT** THE INSTRUCTIONS BELOW ARE ONLY FOR THOSE PATIENTS WHO HAVE BEEN TOLD THEY WILL RECEIVE SEDATION OR GENERAL ANESTHESIA DURING THEIR MRI PROCEDURE:  IF YOU WILL RECEIVE SEDATION (take medicine to help you relax during your exam):   You must get the medicine from your doctor before you arrive. Bring the medicine to the exam. Do not take it at home.   Arrive one hour early. Bring someone who can take you home after the test. Your medicine will make you sleepy. After the exam, you may not drive, take a bus or take a taxi by yourself.   No eating 8 hours before your exam. You may have clear liquids up until 4 hours before your exam. (Clear liquids include water, clear tea, black coffee and fruit juice without pulp.)  IF YOU WILL RECEIVE ANESTHESIA (be asleep for your exam):   Arrive 1 1/2 hours early. Bring someone who can take you home after the test. You may not drive, take a bus or take a taxi by yourself.   No eating 8 hours before your exam. You may have clear liquids up until 4 hours before your exam. (Clear liquids include water, clear tea, black coffee and fruit juice without pulp.)  Please call the Imaging Department at your exam site with any questions.            Jul 24, 2017  4:45 PM CDT   (Arrive by 4:30 PM)   MR HEAD W/O CONTRAST ANGIOGRAM with [address] [facility] MRI ([facility])    [address] [address] [phone]           Take your medicines as usual, unless your doctor tells you not to. Bring a list of your current medicines to your exam (including vitamins, minerals and over-the-counter drugs). Also bring the results of similar scans you may have had.  Please remove any body piercings and hair extensions before you arrive.  Follow your doctor s orders. If you do not, we may have to postpone your exam.  You will not have contrast for this exam. You do not need to do anything  special to prepare.  The MRI machine uses a strong magnet. Please wear clothes without metal (snaps, zippers). A sweatsuit works well, or we may give you a hospital gown.   **IMPORTANT** THE INSTRUCTIONS BELOW ARE ONLY FOR THOSE PATIENTS WHO HAVE BEEN TOLD THEY WILL RECEIVE SEDATION OR GENERAL ANESTHESIA DURING THEIR MRI PROCEDURE:  IF YOU WILL RECEIVE SEDATION (take medicine to help you relax during your exam):   You must get the medicine from your doctor before you arrive. Bring the medicine to the exam. Do not take it at home.   Arrive one hour early. Bring someone who can take you home after the test. Your medicine will make you sleepy. After the exam, you may not drive, take a bus or take a taxi by yourself.   No eating 8 hours before your exam. You may have clear liquids up until 4 hours before your exam. (Clear liquids include water, clear tea, black coffee and fruit juice without pulp.)  IF YOU WILL RECEIVE ANESTHESIA (be asleep for your exam):   Arrive 1 1/2 hours early. Bring someone who can take you home after the test. You may not drive, take a bus or take a taxi by yourself.   No eating 8 hours before your exam. You may have clear liquids up until 4 hours before your exam. (Clear liquids include water, clear tea, black coffee and fruit juice without pulp.)   You will spend four to five hours in the recovery room.  Please call the Imaging Department at your exam site with any questions.            Jul 24, 2017  5:30 PM CDT   (Arrive by 5:15 PM)   MR BRAIN W/O & W CONTRAST with [address] [facility] ([facility])    [address] [address] [phone]           Take your medicines as usual, unless your doctor tells you not to. Bring a list of your current medicines to your exam (including vitamins, minerals and over-the-counter drugs).  You will be given intravenous contrast for this exam. To prepare:   The day before your exam,  drink extra fluids at least six 8-ounce glasses (unless your doctor tells you to restrict your fluids).   Have a blood test (creatinine test) within 30 days of your exam. Go to your clinic or Diagnostic Imaging Department for this test.  The MRI machine uses a strong magnet. Please wear clothes without metal (snaps, zippers). A sweatsuit works well, or we may give you a hospital gown.  Please remove any body piercings and hair extensions before you arrive. You will also remove watches, jewelry, hairpins, wallets, dentures, partial dental plates and hearing aids. You may wear contact lenses, and you may be able to wear your rings. We have a safe place to keep your personal items, but it is safer to leave them at home.   **IMPORTANT** THE INSTRUCTIONS BELOW ARE ONLY FOR THOSE PATIENTS WHO HAVE BEEN TOLD THEY WILL RECEIVE SEDATION OR GENERAL ANESTHESIA DURING THEIR MRI PROCEDURE:  IF YOU WILL RECEIVE SEDATION (take medicine to help you relax during your exam):   You must get the medicine from your doctor before you arrive. Bring the medicine to the exam. Do not take it at home.   Arrive one hour early. Bring someone who can take you home after the test. Your medicine will make you sleepy. After the exam, you may not drive, take a bus or take a taxi by yourself.   No eating 8 hours before your exam. You may have clear liquids up until 4 hours before your exam. (Clear liquids include water, clear tea, black coffee and fruit juice without pulp.)  IF YOU WILL RECEIVE ANESTHESIA (be asleep for your exam):   Arrive 1 1/2 hours early. Bring someone who can take you home after the test. You may not drive, take a bus or take a taxi by yourself.   No eating 8 hours before your exam. You may have clear liquids up until 4 hours before your exam. (Clear liquids include water, clear tea, black coffee and fruit juice without pulp.)  Please call the Imaging Department at your exam site with any questions.            Jul 25, 2017  9:00  AM CDT   (Arrive by 8:45 AM)   Return Stroke with Franklin Hidalgo MD   [facility] Neurology ([facility])    [address] [facility] [address] [phone]            Aug 23, 2017  8:00 AM CDT   (Arrive by 7:45 AM)   Return Visit with Miguel Beauchamp MD   [facility] Physical Medicine and Rehabilitation ([facility])    [address]  [address] [facility] [address] [phone]              Who to contact     Please call your clinic at [phone] to:    Ask questions about your health    Make or cancel appointments    Discuss your medicines    Learn about your test results    Speak to your doctor   If you have compliments or concerns about an experience at your clinic, or if you wish to file a complaint, please contact [facility] Patient Relations at [phone] or email us at [email or web address]         Additional Information About Your Visit        [employer] Information     [employer] gives you secure access to your electronic health record. If you see a primary care provider, you can also send messages to your care team and make appointments. If you have questions, please call your primary care clinic.  If you do not have a primary care provider, please call [phone] and they will assist you.      [employer] is an electronic gateway that provides easy, online access to your medical records. With [employer], you can request a clinic appointment, read your test results, renew a prescription or communicate with your care team.     To access your existing account, please contact your [facility] or call [phone] for assistance.        Care EveryWhere ID     This is your Care EveryWhere ID. This could be used by other organizations to access your [place] medical records  [id number]        Your Vitals Were     Pulse Height BMI (Body Mass Index)              89 1.829 m (6') 22.32 kg/m2          Blood Pressure from Last 3 Encounters:   07/20/17 123/70   07/19/17 149/86   07/17/17 132/79    Weight from Last 3 Encounters:   07/20/17 74.7 kg (164 lb 9.6 oz)   07/17/17 73.7 kg (162 lb 7.7 oz)   07/14/17 73.9 kg (162 lb 14.7 oz)              Today, you had the following     No orders found for display       Primary Care Provider Office Phone # Fax #    Edmond Cano PA-C [phone]       [facility] [address] [place] [id number]        Equal Access to Services     CLAUDIO CARTY : Hadii aad ku hadasho Soenochali, waaxda luqadaha, qaybta kaalmada adeegyada, waxay idiin hayjenniffer henriquez . So [facility] [phone].    ATENCIÓN: Si habla español, tiene a naik disposición servicios gratuitos de asistencia lingüística. [facility] [phone].    We comply with applicable federal civil rights laws and [place] laws. We do not discriminate on the basis of race, color, national origin, age, disability sex, sexual orientation or gender identity.            Thank you!     Thank you for choosing [facility] PHYSICAL MEDICINE AND REHABILITATION  for your care. Our goal is always to provide you with excellent care. Hearing back from our patients is one way we can continue to improve our services. Please take a few minutes to complete the written survey that you may receive in the mail after your visit with us. Thank you!             Your Updated Medication List - Protect others around you: Learn how to safely use, store and throw away your medicines at www.disposemymeds.org.          This list is accurate as of: 7/20/17 10:08 AM.  Always use your most recent med list.                   Brand Name Dispense Instructions for use Diagnosis    hydrOXYzine 25 MG tablet    ATARAX    60 tablet    1 to 2 tablets every 6 hours as needed for panic/anxiety    Spasticity, Insomnia, unspecified type, Panic attack       lisinopril 10 MG tablet     PRINIVIL/ZESTRIL    90 tablet    1 tab daily    Hemorrhagic stroke (H), Right hemiparesis (H), Dysarthria, Hypertension goal BP (blood pressure) < 140/90       propranolol 20 MG tablet    INDERAL    90 tablet    Take 1 tablet (20 mg) by mouth as needed Daily for panic / anxiety    Spasticity, Panic attack       traZODone 50 MG tablet    DESYREL    180 tablet    1 to 3 tablets, 30 minutes before bedtime    Insomnia, unspecified type

## 2017-07-21 ENCOUNTER — OFFICE VISIT (OUTPATIENT)
Dept: PHYSICAL MEDICINE AND REHAB | Facility: CLINIC | Age: 52
End: 2017-07-21

## 2017-07-21 VITALS
BODY MASS INDEX: 22.31 KG/M2 | HEIGHT: 72 IN | HEART RATE: 86 BPM | SYSTOLIC BLOOD PRESSURE: 123 MMHG | WEIGHT: 164.68 LBS | DIASTOLIC BLOOD PRESSURE: 92 MMHG

## 2017-07-21 DIAGNOSIS — I63.89 CEREBROVASCULAR ACCIDENT (CVA) DUE TO OTHER MECHANISM (H): Primary | ICD-10-CM

## 2017-07-21 ASSESSMENT — PAIN SCALES - GENERAL: PAINLEVEL: NO PAIN (0)

## 2017-07-21 NOTE — PROGRESS NOTES
Last visit today.  No adverse events throughout series.  Box and Block test =6 on right and 67 on left, compared to 9 and 67, respectively, at baseline.  Although this test shows declined function on right, it really is not a valid test for him, as his pronation disallows proper pickup of blocks.  Instead, he scoops the blocks which is not the function this test is supposed to measure.  He did improve considerably on card turning and bringing objects to mouth.  These are shown on videos.  Excitability of ipsilesional M1 showed improvement.  RMT was about 56 at baseline and it was 50 today.  Average MEP amplitude of 10 single pulses with intensity at 73 improved from 118 uV to 174.8+/-42.1.  He reports a general feeling of improved well-being.  He rated his Global rating of change Score at +5 (a good deal better).  He received 15 min of intermittent 6 Hz rTMS to ipsilesional M1 today at an intensity of 45,  Exercises as usual.  Gave him a written list of ex to continue at home.

## 2017-07-21 NOTE — LETTER
7/21/2017       RE: Maldonado Mendiola  [address]  [facility] [address]     Dear Colleague,    Thank you for referring your patient, Maldonado Mendiola, to the [facility] PHYSICAL MEDICINE AND REHABILITATION at [facility]. Please see a copy of my visit note below.    Last visit today.  No adverse events throughout series.  Box and Block test =6 on right and 67 on left, compared to 9 and 67, respectively, at baseline.  Although this test shows declined function on right, it really is not a valid test for him, as his pronation disallows proper pickup of blocks.  Instead, he scoops the blocks which is not the function this test is supposed to measure.  He did improve considerably on card turning and bringing objects to mouth.  These are shown on videos.  Excitability of ipsilesional M1 showed improvement.  RMT was about 56 at baseline and it was 50 today.  Average MEP amplitude of 10 single pulses with intensity at 73 improved from 118 uV to 174.8+/-42.1.  He reports a general feeling of improved well-being.  He rated his Global rating of change Score at +5 (a good deal better).  He received 15 min of intermittent 6 Hz rTMS to ipsilesional M1 today at an intensity of 45,  Exercises as usual.  Gave him a written list of ex to continue at home.    Again, thank you for allowing me to participate in the care of your patient.      Sincerely,    Sumeet Caballero, PT

## 2017-07-21 NOTE — MR AVS SNAPSHOT
After Visit Summary   7/21/2017    Maldonado Mendiola    MRN: [id number]           Patient Information     Date Of Birth          1965        Visit Information        Provider Department      7/21/2017 8:30 AM Sumeet Caballero PT [facility] Physical Medicine and Rehabilitation        Today's Diagnoses     Cerebrovascular accident (CVA) due to other mechanism (H)    -  1       Follow-ups after your visit        Your next 10 appointments already scheduled     Jul 24, 2017  4:00 PM CDT   (Arrive by 3:45 PM)   MR NECK W CONTRAST ANGIOGRAM with [address] [facility] [place] [facility] ([facility] [place])    [address] [address] [phone]           Take your medicines as usual, unless your doctor tells you not to. Bring a list of your current medicines to your exam (including vitamins, minerals and over-the-counter drugs).  You will be given intravenous contrast for this exam. To prepare:   The day before your exam, drink extra fluids at least six 8-ounce glasses (unless your doctor tells you to restrict your fluids).   Have a blood test (creatinine test) within 30 days of your exam. Go to your clinic or Diagnostic Imaging Department for this test.  The MRI machine uses a strong magnet. Please wear clothes without metal (snaps, zippers). A sweatsuit works well, or we may give you a hospital gown.  Please remove any body piercings and hair extensions before you arrive. You will also remove watches, jewelry, hairpins, wallets, dentures, partial dental plates and hearing aids. You may wear contact lenses, and you may be able to wear your rings. We have a safe place to keep your personal items, but it is safer to leave them at home.   **IMPORTANT** THE INSTRUCTIONS BELOW ARE ONLY FOR THOSE PATIENTS WHO HAVE BEEN TOLD THEY WILL RECEIVE SEDATION OR GENERAL ANESTHESIA DURING THEIR MRI PROCEDURE:  IF YOU WILL RECEIVE SEDATION (take medicine to help you relax  during your exam):   You must get the medicine from your doctor before you arrive. Bring the medicine to the exam. Do not take it at home.   Arrive one hour early. Bring someone who can take you home after the test. Your medicine will make you sleepy. After the exam, you may not drive, take a bus or take a taxi by yourself.   No eating 8 hours before your exam. You may have clear liquids up until 4 hours before your exam. (Clear liquids include water, clear tea, black coffee and fruit juice without pulp.)  IF YOU WILL RECEIVE ANESTHESIA (be asleep for your exam):   Arrive 1 1/2 hours early. Bring someone who can take you home after the test. You may not drive, take a bus or take a taxi by yourself.   No eating 8 hours before your exam. You may have clear liquids up until 4 hours before your exam. (Clear liquids include water, clear tea, black coffee and fruit juice without pulp.)  Please call the Imaging Department at your exam site with any questions.            Jul 24, 2017  4:45 PM CDT   (Arrive by 4:30 PM)   MR HEAD W/O CONTRAST ANGIOGRAM with [address] [facility] ([facility])    [address] [address] [phone]           Take your medicines as usual, unless your doctor tells you not to. Bring a list of your current medicines to your exam (including vitamins, minerals and over-the-counter drugs). Also bring the results of similar scans you may have had.  Please remove any body piercings and hair extensions before you arrive.  Follow your doctor s orders. If you do not, we may have to postpone your exam.  You will not have contrast for this exam. You do not need to do anything special to prepare.  The MRI machine uses a strong magnet. Please wear clothes without metal (snaps, zippers). A sweatsuit works well, or we may give you a hospital gown.   **IMPORTANT** THE INSTRUCTIONS BELOW ARE ONLY FOR THOSE PATIENTS WHO HAVE BEEN TOLD THEY  WILL RECEIVE SEDATION OR GENERAL ANESTHESIA DURING THEIR MRI PROCEDURE:  IF YOU WILL RECEIVE SEDATION (take medicine to help you relax during your exam):   You must get the medicine from your doctor before you arrive. Bring the medicine to the exam. Do not take it at home.   Arrive one hour early. Bring someone who can take you home after the test. Your medicine will make you sleepy. After the exam, you may not drive, take a bus or take a taxi by yourself.   No eating 8 hours before your exam. You may have clear liquids up until 4 hours before your exam. (Clear liquids include water, clear tea, black coffee and fruit juice without pulp.)  IF YOU WILL RECEIVE ANESTHESIA (be asleep for your exam):   Arrive 1 1/2 hours early. Bring someone who can take you home after the test. You may not drive, take a bus or take a taxi by yourself.   No eating 8 hours before your exam. You may have clear liquids up until 4 hours before your exam. (Clear liquids include water, clear tea, black coffee and fruit juice without pulp.)   You will spend four to five hours in the recovery room.  Please call the Imaging Department at your exam site with any questions.            Jul 24, 2017  5:30 PM CDT   (Arrive by 5:15 PM)   MR BRAIN W/O & W CONTRAST with [address] [facility] ([facility])    [address] [place] [address] [phone]           Take your medicines as usual, unless your doctor tells you not to. Bring a list of your current medicines to your exam (including vitamins, minerals and over-the-counter drugs).  You will be given intravenous contrast for this exam. To prepare:   The day before your exam, drink extra fluids at least six 8-ounce glasses (unless your doctor tells you to restrict your fluids).   Have a blood test (creatinine test) within 30 days of your exam. Go to your clinic or Diagnostic Imaging Department for this test.  The MRI machine uses a  strong magnet. Please wear clothes without metal (snaps, zippers). A sweatsuit works well, or we may give you a hospital gown.  Please remove any body piercings and hair extensions before you arrive. You will also remove watches, jewelry, hairpins, wallets, dentures, partial dental plates and hearing aids. You may wear contact lenses, and you may be able to wear your rings. We have a safe place to keep your personal items, but it is safer to leave them at home.   **IMPORTANT** THE INSTRUCTIONS BELOW ARE ONLY FOR THOSE PATIENTS WHO HAVE BEEN TOLD THEY WILL RECEIVE SEDATION OR GENERAL ANESTHESIA DURING THEIR MRI PROCEDURE:  IF YOU WILL RECEIVE SEDATION (take medicine to help you relax during your exam):   You must get the medicine from your doctor before you arrive. Bring the medicine to the exam. Do not take it at home.   Arrive one hour early. Bring someone who can take you home after the test. Your medicine will make you sleepy. After the exam, you may not drive, take a bus or take a taxi by yourself.   No eating 8 hours before your exam. You may have clear liquids up until 4 hours before your exam. (Clear liquids include water, clear tea, black coffee and fruit juice without pulp.)  IF YOU WILL RECEIVE ANESTHESIA (be asleep for your exam):   Arrive 1 1/2 hours early. Bring someone who can take you home after the test. You may not drive, take a bus or take a taxi by yourself.   No eating 8 hours before your exam. You may have clear liquids up until 4 hours before your exam. (Clear liquids include water, clear tea, black coffee and fruit juice without pulp.)  Please call the Imaging Department at your exam site with any questions.            Jul 25, 2017  9:00 AM CDT   (Arrive by 8:45 AM)   Return Stroke with Franklin Hidalgo MD   [facility] ([facility] [place])    [address] [facility]  [address] [facility] [address] [phone]            Aug 23, 2017  8:00 AM CDT    (Arrive by 7:45 AM)   Return Visit with Miguel Beauchamp MD   [facility] Medicine [facility] ([facility] [place])    [address] [facility]  [address] [facility] [address] [phone]              Who to contact     Please call your clinic at [phone] to:    Ask questions about your health    Make or cancel appointments    Discuss your medicines    Learn about your test results    Speak to your doctor   If you have compliments or concerns about an experience at your clinic, or if you wish to file a complaint, please contact [facility] Patient Relations at [phone] or email us at [email or web address]         Additional Information About Your Visit        [employer] Information     [employer] gives you secure access to your electronic health record. If you see a primary care provider, you can also send messages to your care team and make appointments. If you have questions, please call your primary care clinic.  If you do not have a primary care provider, please call [phone] and they will assist you.      [employer] is an electronic gateway that provides easy, online access to your medical records. With [employer], you can request a clinic appointment, read your test results, renew a prescription or communicate with your care team.     To access your existing account, please contact your [facility] or call [phone] for assistance.        Care EveryWhere ID     This is your Care EveryWhere ID. This could be used by other organizations to access your [place] medical records  [id number]        Your Vitals Were     Pulse Height BMI (Body Mass Index)             86 1.829 m (6') 22.34 kg/m2          Blood Pressure from Last 3 Encounters:   07/21/17 (!) 123/92   07/20/17 123/70   07/19/17 149/86    Weight from Last 3 Encounters:   07/21/17 74.7 kg (164 lb 10.9 oz)   07/20/17 74.7 kg (164 lb 9.6 oz)   07/17/17  73.7 kg (162 lb 7.7 oz)              Today, you had the following     No orders found for display       Primary Care Provider Office Phone # Fax #    Edmond Cano PA-C [phone]       [address] [address]        Equal Access to Services     CLAUDIO CARTY : Hadii aad ku hadasho Soomaali, waaxda luqadaha, qaybta kaalmada adeegyada, waxay idiin hayaan adeeg kharash la'aan . So [facility] [phone].    ATENCIÓN: Si habla español, tiene a naik disposición servicios gratuitos de asistencia lingüística. Edeame al [phone].    We comply with applicable federal civil rights laws and [place] laws. We do not discriminate on the basis of race, color, national origin, age, disability sex, sexual orientation or gender identity.            Thank you!     Thank you for choosing [facility] PHYSICAL MEDICINE AND REHABILITATION  for your care. Our goal is always to provide you with excellent care. Hearing back from our patients is one way we can continue to improve our services. Please take a few minutes to complete the written survey that you may receive in the mail after your visit with us. Thank you!             Your Updated Medication List - Protect others around you: Learn how to safely use, store and throw away your medicines at www.disposemymeds.org.          This list is accurate as of: 7/21/17 10:25 AM.  Always use your most recent med list.                   Brand Name Dispense Instructions for use Diagnosis    hydrOXYzine 25 MG tablet    ATARAX    60 tablet    1 to 2 tablets every 6 hours as needed for panic/anxiety    Spasticity, Insomnia, unspecified type, Panic attack       lisinopril 10 MG tablet    PRINIVIL/ZESTRIL    90 tablet    1 tab daily    Hemorrhagic stroke (H), Right hemiparesis (H), Dysarthria, Hypertension goal BP (blood pressure) < 140/90       propranolol 20 MG tablet    INDERAL    90 tablet    Take 1 tablet (20 mg) by mouth as needed  Daily for panic / anxiety    Spasticity, Panic attack       traZODone 50 MG tablet    DESYREL    180 tablet    1 to 3 tablets, 30 minutes before bedtime    Insomnia, unspecified type

## 2017-07-24 NOTE — PROGRESS NOTES
STROKE CLINIC NOTE     Maldonado Mendiola is a 52 year old male following up in our clinic post hospital admission for intracranial hemorrhage in January 2017. He is here for his 3 month follow up. He used to work in [employer] in a . Currently he is on disability.      HPI     He has history of HTN who had a left basal ganglia hemorrhage in January 2018. He initially presented with acute right sided weakness and receptive aphasia while he was on a vacation on [facility]. He says he was on a rush hour drive and it was very stressful when he had the symptoms.     CT head showed left basal ganglia ICH with 2 smaller foci of hemorrhage in the same area. The etiology was thought to be due to HTN. His aphasia has improved. He was left with sequelae of right hand weakness, cognitive deficit and behavioral problems.  Last PT was 7/19- hand dexterity still not at functional capacity. He had problems with depression and suicidal thoughts, but this has improved since and today he says he is feeling a lot better. His main questions today are : How does his MRI Brain look and what does it mean for him in the future( prognosis).    The last time the patient was in the clinic,we obtained an MRI Brain/MRA . I reviewed the scans with the patient. He did not go for a sleep study because he was having a hard time sleeping, he was waiting till his sleeping patterns adjusted. He has a lot of anxiety with his right arm weakness which is contributing to his insomnia. Trazodone is helping. He does not follow with a sleep physician. Melatonin did not help him. He did go for a TMS, that we suggested last week.      PMH     Past Medical History:   Diagnosis Date     Anxiety        No past surgical history on file.    Medications: I have reviewed this patient's current medications.  Current Outpatient Prescriptions   Medication     lisinopril (PRINIVIL/ZESTRIL) 10 MG tablet     traZODone (DESYREL) 50 MG tablet     hydrOXYzine  (ATARAX) 25 MG tablet     propranolol (INDERAL) 20 MG tablet     No current facility-administered medications for this visit.        No Known Allergies    Family History: NO FH of aneurysms or bleeds.    Social History: Non smoker.      ROS     A 14 point comprehensive ROS was obtained and was negative except for that mentioned in HPI and exam.      Objective       VITALS  /72  Pulse 88  Ht 1.829 m (6')    PHYSICAL EXAMINATION    Awake, alert and oriented x3  Cranial Nerves: PERRLA, VFF, EOMI, Mild Rt facial droop,hearing normal B/L, palate elevates to midline, shoulder shrug strong weak on Rt, tongue movements intact   Motor: Strength 5/5 on Lt. RUE: Shoulder abduction & adduction 3/5; elbow flexion 3/5; elbow extension 3+/5; wrist flexion & extension 2/5; finger flexion/extebnsion 1/5. RLE: Hip flexion & extension 4/5; knee flexion & extension 5-/4; ankle flexion & extension 4/5. Disuse atrophy of LLE and LUE.  Sensations: Moderately diminished on Rt as compared to Lt in face, arm & leg  Cerebellar signs: FTN/HST intact on Lt. Cannot test FTN on Rt. Intact HST on Lt.   Gait: Hemiparetic gait. Normal. Romberg's neg. Tandem gait normal  Attention/Concentration: Intact per serial 7's  Memory: Immediate & delayed recall 3/3    National Institutes of Health Stroke Scale  Exam Interval: 6 months   Score    Level of consciousness: (0)   Alert, keenly responsive    LOC questions: (0)   Answers both questions correctly    LOC commands: (0)   Performs both tasks correctly    Best gaze: (0)   Normal    Visual: (0)   No visual loss    Facial palsy: (0)   Normal symmetrical movements    Motor arm (left): (0)   No drift    Motor arm (right): (1)   Drift    Motor leg (left): (0)   No drift    Motor leg (right): (1)   Drift    Limb ataxia: (0)   Absent    Sensory: (1)   Mild to moderate sensory loss    Best language: (0)   Normal- no aphasia    Dysarthria: (0)   Normal    Extinction and inattention: (0)   No abnormality         Total Score:  3     mRS : 3 - Moderate disability. Requires some help, but able to walk unassisted.    LABS/IMAGING and other ancillary data     MRI Brain/MRA 7/24/2017    Impression:  1. No evidence of acute infarction or acute intracranial hemorrhage.  Evidence of prior hemorrhagic infarct in the left basal ganglia,  posterior limb of the left internal capsule, external capsule and  periventricular frontal lobe white matter.  2. No abnormal enhancing lesions intracranially.  3. Mild narrowing of the V4 segment of the right vertebral artery. No  evidence of vascular malformation.    ASSESSMENT     # Left Basal Ganglia hemorrhage- weakness improved a little.  # Cognitive problems, Depression  # HTN    PLAN     Continue with BP control.   Continue TMS.  Addressed patients concerns.  Will wait for sleep study.  No need to return to our clinic. Call if needed.      Nader MCINTOSH  PGY-5, Vascular Neurology Fellow        VASCULAR NEUROLOGY ATTENDING ATTESTATION    I saw the patient July 25, 2017 with the stroke fellow.  I reviewed all laboratory studies and neuroimaging.  I discussed the plan with the fellow and agree with their note.    In summary, this is a 52 year old male who presented December 2016 with left BG ICH likely secondary to HTN.  MRI/MRA/CTA were unrevealing for underlying mass lesion.  He completed rTMS with some improvement in hand dexterity.    1. BP goal <130/85  2. Consider repeat rTMS for recovery prn  3. Follow-up in 6 months    Stroke education provided, call with questions.    Franklin Hidalgo MD, MS  Vascular Neurology

## 2017-07-25 ENCOUNTER — OFFICE VISIT (OUTPATIENT)
Dept: NEUROLOGY | Facility: CLINIC | Age: 52
End: 2017-07-25

## 2017-07-25 VITALS — SYSTOLIC BLOOD PRESSURE: 127 MMHG | DIASTOLIC BLOOD PRESSURE: 72 MMHG | HEIGHT: 72 IN | HEART RATE: 88 BPM

## 2017-07-25 DIAGNOSIS — I62.9 INTRACRANIAL HEMORRHAGE (H): Primary | ICD-10-CM

## 2017-07-25 ASSESSMENT — PAIN SCALES - GENERAL: PAINLEVEL: NO PAIN (0)

## 2017-07-25 NOTE — LETTER
7/25/2017       RE: Maldonado Mendiola  [address]  [place] [address]     Dear Colleague,    Thank you for referring your patient, Maldonado Mendiola, to the [facility] NEUROLOGY at [facility]. Please see a copy of my visit note below.    STROKE CLINIC NOTE     Maldonado Mendiola is a 52 year old male following up in our clinic post hospital admission for intracranial hemorrhage in January 2017. He is here for his 3 month follow up. He used to work in [place] in a . Currently he is on disability.      HPI     He has history of HTN who had a left basal ganglia hemorrhage in January 2018. He initially presented with acute right sided weakness and receptive aphasia while he was on a vacation on [facility]. He says he was on a rush hour drive and it was very stressful when he had the symptoms.     CT head showed left basal ganglia ICH with 2 smaller foci of hemorrhage in the same area. The etiology was thought to be due to HTN. His aphasia has improved. He was left with sequelae of right hand weakness, cognitive deficit and behavioral problems.  Last PT was 7/19- hand dexterity still not at functional capacity. He had problems with depression and suicidal thoughts, but this has improved since and today he says he is feeling a lot better. His main questions today are : How does his MRI Brain look and what does it mean for him in the future( prognosis).    The last time the patient was in the clinic,we obtained an MRI Brain/MRA . I reviewed the scans with the patient. He did not go for a sleep study because he was having a hard time sleeping, he was waiting till his sleeping patterns adjusted. He has a lot of anxiety with his right arm weakness which is contributing to his insomnia. Trazodone is helping. He does not follow with a sleep physician. Melatonin did not help him. He did go for a TMS, that we suggested last week.      PMH     Past Medical History:   Diagnosis  Date     Anxiety        No past surgical history on file.    Medications: I have reviewed this patient's current medications.  Current Outpatient Prescriptions   Medication     lisinopril (PRINIVIL/ZESTRIL) 10 MG tablet     traZODone (DESYREL) 50 MG tablet     hydrOXYzine (ATARAX) 25 MG tablet     propranolol (INDERAL) 20 MG tablet     No current facility-administered medications for this visit.        No Known Allergies    Family History: NO FH of aneurysms or bleeds.    Social History: Non smoker.      ROS     A 14 point comprehensive ROS was obtained and was negative except for that mentioned in HPI and exam.      Objective     VITALS  /72  Pulse 88  Ht 1.829 m (6')    PHYSICAL EXAMINATION    Awake, alert and oriented x3  Cranial Nerves: PERRLA, VFF, EOMI, Mild Rt facial droop,hearing normal B/L, palate elevates to midline, shoulder shrug strong weak on Rt, tongue movements intact   Motor: Strength 5/5 on Lt. RUE: Shoulder abduction & adduction 3/5; elbow flexion 3/5; elbow extension 3+/5; wrist flexion & extension 2/5; finger flexion/extebnsion 1/5. RLE: Hip flexion & extension 4/5; knee flexion & extension 5-/4; ankle flexion & extension 4/5. Disuse atrophy of LLE and LUE.  Sensations: Moderately diminished on Rt as compared to Lt in face, arm & leg  Cerebellar signs: FTN/HST intact on Lt. Cannot test FTN on Rt. Intact HST on Lt.   Gait: Hemiparetic gait. Normal. Romberg's neg. Tandem gait normal  Attention/Concentration: Intact per serial 7's  Memory: Immediate & delayed recall 3/3    National Institutes of Health Stroke Scale  Exam Interval: 6 months   Score    Level of consciousness: (0)   Alert, keenly responsive    LOC questions: (0)   Answers both questions correctly    LOC commands: (0)   Performs both tasks correctly    Best gaze: (0)   Normal    Visual: (0)   No visual loss    Facial palsy: (0)   Normal symmetrical movements    Motor arm (left): (0)   No drift    Motor arm (right): (1)   Drift     Motor leg (left): (0)   No drift    Motor leg (right): (1)   Drift    Limb ataxia: (0)   Absent    Sensory: (1)   Mild to moderate sensory loss    Best language: (0)   Normal- no aphasia    Dysarthria: (0)   Normal    Extinction and inattention: (0)   No abnormality        Total Score:  3     mRS : 3 - Moderate disability. Requires some help, but able to walk unassisted.    LABS/IMAGING and other ancillary data     MRI Brain/MRA 7/24/2017    Impression:  1. No evidence of acute infarction or acute intracranial hemorrhage.  Evidence of prior hemorrhagic infarct in the left basal ganglia,  posterior limb of the left internal capsule, external capsule and  periventricular frontal lobe white matter.  2. No abnormal enhancing lesions intracranially.  3. Mild narrowing of the V4 segment of the right vertebral artery. No  evidence of vascular malformation.    ASSESSMENT     # Left Basal Ganglia hemorrhage- weakness improved a little.  # Cognitive problems, Depression  # HTN    PLAN     Continue with BP control.   Continue TMS.  Addressed patients concerns.  Will wait for sleep study.  No need to return to our clinic. Call if needed.      Nader MCINTOSH  PGY-5, Vascular Neurology Fellow    VASCULAR NEUROLOGY ATTENDING ATTESTATION    I saw the patient July 25, 2017 with the stroke fellow.  I reviewed all laboratory studies and neuroimaging.  I discussed the plan with the fellow and agree with their note.    In summary, this is a 52 year old male who presented December 2016 with left BG ICH likely secondary to HTN.  MRI/MRA/CTA were unrevealing for underlying mass lesion.  He completed rTMS with some improvement in hand dexterity.    1. BP goal <130/85  2. Consider repeat rTMS for recovery prn  3. Follow-up in 6 months    Stroke education provided, call with questions.    Franklin Hidalgo MD, MS  Vascular Neurology

## 2017-07-25 NOTE — MR AVS SNAPSHOT
After Visit Summary   7/25/2017    Maldonado Mendiola    MRN: [id number]           Patient Information     Date Of Birth          1965        Visit Information        Provider Department      7/25/2017 9:00 AM Franklin Hidalgo MD [facility] Neurology        Today's Diagnoses     Intracranial hemorrhage (H)    -  1       Follow-ups after your visit        Follow-up notes from your care team     Return in about 6 months (around 1/25/2018).      Your next 10 appointments already scheduled     Aug 23, 2017  8:00 AM CDT   (Arrive by 7:45 AM)   Return Visit with Miguel Beauchamp MD   [facility] Physical Medicine and Rehabilitation ([facility])    [address]  [address] [facility] [place] [address] [phone]              Who to contact     Please call your clinic at [phone] to:    Ask questions about your health    Make or cancel appointments    Discuss your medicines    Learn about your test results    Speak to your doctor   If you have compliments or concerns about an experience at your clinic, or if you wish to file a complaint, please contact [facility] Patient Relations at [phone] or email us at [email or web address]         Additional Information About Your Visit        MyChart Information     [employer] gives you secure access to your electronic health record. If you see a primary care provider, you can also send messages to your care team and make appointments. If you have questions, please call your primary care clinic.  If you do not have a primary care provider, please call [phone] and they will assist you.      [employer] is an electronic gateway that provides easy, online access to your medical records. With [employer], you can request a clinic appointment, read your test results, renew a prescription or communicate with your care team.     To access your existing account, please contact your [facility]  [place] [facility] or call [phone] for assistance.        Care EveryWhere ID     This is your Care EveryWhere ID. This could be used by other organizations to access your [facility] medical records  [id number]        Your Vitals Were     Pulse Height                88 1.829 m (6')           Blood Pressure from Last 3 Encounters:   07/25/17 127/72   07/21/17 (!) 123/92   07/20/17 123/70    Weight from Last 3 Encounters:   07/21/17 74.7 kg (164 lb 10.9 oz)   07/20/17 74.7 kg (164 lb 9.6 oz)   07/17/17 73.7 kg (162 lb 7.7 oz)              Today, you had the following     No orders found for display       Primary Care Provider Office Phone # Fax #    Edmond Cano PA-C [phone]       [address] [address]        Equal Access to Services     CLAUDIO CARTY : Hadii aad ku hadasho Soomaali, waaxda luqadaha, qaybta kaalmada adeegyada, waxay idiin hayaan goyo henriquez . So [facility] [phone].    ATENCIÓN: Si habla español, tiene a naik disposición servicios gratuitos de asistencia lingüística. Llame al [phone].    We comply with applicable federal civil rights laws and [place] laws. We do not discriminate on the basis of race, color, national origin, age, disability sex, sexual orientation or gender identity.            Thank you!     Thank you for choosing [facility]  for your care. Our goal is always to provide you with excellent care. Hearing back from our patients is one way we can continue to improve our services. Please take a few minutes to complete the written survey that you may receive in the mail after your visit with us. Thank you!             Your Updated Medication List - Protect others around you: Learn how to safely use, store and throw away your medicines at www.disposemymeds.org.          This list is accurate as of: 7/25/17  2:17 PM.  Always use your most recent med list.                   Brand Name  Dispense Instructions for use Diagnosis    hydrOXYzine 25 MG tablet    ATARAX    60 tablet    1 to 2 tablets every 6 hours as needed for panic/anxiety    Spasticity, Insomnia, unspecified type, Panic attack       lisinopril 10 MG tablet    PRINIVIL/ZESTRIL    90 tablet    1 tab daily    Hemorrhagic stroke (H), Right hemiparesis (H), Dysarthria, Hypertension goal BP (blood pressure) < 140/90       propranolol 20 MG tablet    INDERAL    90 tablet    Take 1 tablet (20 mg) by mouth as needed Daily for panic / anxiety    Spasticity, Panic attack       traZODone 50 MG tablet    DESYREL    180 tablet    1 to 3 tablets, 30 minutes before bedtime    Insomnia, unspecified type

## 2017-09-11 NOTE — PROGRESS NOTES
03/24/17 0800   Signing Clinician's Name / Credentials   Signing clinician's name / credentials Rochelle Capone PT; Brenna Ramos, SPT   Session Number   Session Number 17/ 20 (60 total HP btwn SP/OT/PT)   Session Tracking and Supervision   Supervision Direct supervision provided   Goal 1   Goal Identifier bed mobility   Goal Description pt to demo indep w bed mob using legs indep of UE's to get in/out of bed   Target Date 04/27/17   Goal 2   Goal Identifier gait   Goal Description 25ft walk test to show improvement in speed for safety - showing 25 ft walk test in 13 sec or less w approp AD   Target Date 04/27/17   Goal 3   Goal Identifier DGI   Goal Description pt to show improved DGI test for community ambulation for a score 19/24 or higher to show decreased falls risk   Target Date 04/27/17   Goal 4   Goal Identifier gait   Goal Description pt to amb w approp or no AD a distance of 1000ft for short community outings such as at grocery store or son's school   Target Date 04/27/17   Goal 5   Goal Identifier HEP   Goal Description pt to be indep w a LE strengthening and balance HEP to assist in meeting above goals and to be indep w floor tx for floor exer/stretching.    Target Date 04/27/17   Subjective Report   Subjective Report I have been having strong feelings of hopelessness, especially in the past week.    Neuromuscular Re-education   Minutes 40   Skilled Intervention dynamic balance activities, tall kneeling, education    Patient Response Tolerated well. Reports LE's fatigued by end of session. Pt sweating after tall kneeling exercises but reports it felt good.    Treatment Detail Pt performed heel touches to 4 inch step while standing on bottom step of staircase x 15 reps B. Progressed to heel touches to SPT's foot, which increased challenge (to floor still too difficult). While standing on RLE and moving LLE, tactile cues on pt's R knee to bring over toes provided. Then took tactile cues away, and pt  demonstrated better knee control on R with LLE descent. Pt performed weight shifts while standing on wobble board x 15 reps side to side with cues to use LUE for balance support only, rely more on LE's for balance and stability. Hold x 60 seconds w/o use of UE's on // bars. Performed front/back as well x 10 reps and progressed to no UE use x 5 reps (no LOB but unable to weight shift as far w/o UE support). Hold x 60 seconds with no UE use. Next pt performed step up/down (L UE used 6 inch step, R UE used 4 inch step) and side/side with 4 inch step x 15 reps each. Practiced decreased to no use of UE with LUE step ups. Pt able to complete ~5 reps w/o UEs. Pt demonstrated how he performs heel raises at home and presented with decreased stability of R knee (shaky). Cues to contract gluts/quads to help with stability and had pt repeat x 12 reps. Demonstrated improvement with knee stability with cues. Lastlly, pt performed tall kneeling weight shifts and mini squats x 10 reps while holding onto a chair. Cues for decreased speed: pt demonstrated improvement in ability to squat with equal WB'ing. Pt performed x 5 reps of diagonals in tall kneeling; having LUE hold onto R UE to perform movement. Cues for sequencing and positioning provided. Slightly more difficult with L upper to R lower diagonals. See below for education.    Progress Good, pt able to tolerate more activity (increased repetiions) and less resting   Gait Training   Minutes 10   Skilled Intervention ambulation w/o cane and stair training    Patient Response Tolerated well.    Treatment Detail Pt ambulated without SEC approx 150 feet with CGA. Cues to focus on heel to toe gait pattern and equal stance time (accepting more weight on RLE for longer duration) was provided. Pt responded well demonstrating appropriate adjustments. Cues for upright posture and contraction of abdominals was provided. Pt demonstrated how he performs stairs at home when nobody is home  "(step-to with use of 1 rail). Demonstrated this safely with SBA. Had pt ascend/descend 6 steps with alternating gait pattern and use of 1 rail with cues to keep gluts/quads tight on R side for improved stability. Reports it felt good to walk up steps \"normal.\"    Progress Good, pt progressing with walking and demonstrating improved stability with verbal cues    Education   Learner Patient   Readiness Acceptance   Method Explanation;Booklet/handout   Response Verbalizes Understanding   Education Comments Discussed pt's mental state and feelings of hopelessness with him as per chart review pt called dr office nurse yesterday with sucidial throughs. Pt very open to discussing feelings. Provided education to patient about services provided at [facility] for mental health (if admitted today for depression and sucidial thoughts). Educ pt about options, not being alone right now and pt agreed. Pt open to PT calling wife and nurse to discuss status. Educ about health psychology and their role in pt's recovery. Pt very open to this and agreed it would be beneficial for him.    Communication with other professionals   Communication with other professionals PT called pt's wife and nurse who had talked with pt over the phone yesterday pertaining to suicidal thoughts and hopelessness. Pt's wife receptive to PT concerns for pt and reports she will come home early from work to work at home so pt is not alone. Pt has appointment with primary MD on Monday and will discuss mental health options and medications with them.    Plan   Homework When performing B heel raises at home, try focusing on riccardo quad/gluts to support hip/knee to improve stability of RLE.    Home program Continue current HEP . SEE Pt instructions/ AVS re; gave phone number for Crisis line [phone] and was told to go to ER if need over weekend.   Discussed options w/ pt and OT incl [facility] day [facility] option and OT will look into other " options for out of home time for support when pt's wife is at work.   Plan for next session 25 ft walk test, Tall kneeling diagonals (try standing twists/up/down), Wii balance board, trial of trampoline, treadmill (challenge speed/activity tolerance).  check DGI in next 2 sessions.   Comments   Comments very motivated.  see pt instructions and telephone note from PT.   Total Session Time   Total Treatment Time (sum of timed and untimed services) 50

## 2017-09-11 NOTE — ADDENDUM NOTE
Encounter addended by: Rochelle Capone, PT on: 9/11/2017  9:31 AM<BR>     Actions taken: Flowsheet accepted, Sign clinical note, Episode resolved

## 2017-09-11 NOTE — PROGRESS NOTES
Outpatient Physical Therapy Discharge Note     Patient: Maldonado Mendiola  : 1965    Beginning/End Dates of Reporting Period:  17 to 2017    Referring Provider: Miguel Cee Diagnosis: R hemiplegia     Client Self Report: I have been having strong feelings of hopelessness, especially in the past week.     Objective Measurements:  Not done on last therapy day as did not know he would d/c.     Goals:  Goal Identifier bed mobility   Goal Description pt to demo indep w bed mob using legs indep of UE's to get in/out of bed   Target Date 17   Date Met      Progress: indep bed mob BUT not w/ use of R UE     Goal Identifier gait   Goal Description 25ft walk test to show improvement in speed for safety - showing 25 ft walk test in 13 sec or less w approp AD   Target Date 17   Date Met      Progress:  Not retested as he tx therapy     Goal Identifier DGI   Goal Description pt to show improved DGI test for community ambulation for a score 19/24 or higher to show decreased falls risk   Target Date 17   Date Met      Progress:  Not retested     Goal Identifier gait   Goal Description pt to amb w approp or no AD a distance of 1000ft for short community outings such as at grocery store or son's school   Target Date 17   Date Met      Progress:  met     Goal Identifier HEP   Goal Description  pt to be indep w a LE strengthening and balance HEP to assist in meeting above goals and to be indep w floor tx for floor exer/stretching.    Target Date 17   Date Met      Progress:  In progress         Progress Toward Goals:   Progress this reporting period: pt decided / after 3/24/17 to attend remaining therapy at [facility] due to their fitness center and pool options.  D/c this pt as of 3/24/17 as he then attended [facility].        Plan:  Discharge from therapy. Pt to cont PT / OT at [facility].     Discharge:    Reason for Discharge: Patient chooses to discontinue  therapy.    Equipment Issued: none/ cane    Discharge Plan: Other services: therapy and pool at [facility]....

## 2017-09-15 DIAGNOSIS — I61.9 HEMORRHAGIC STROKE (H): ICD-10-CM

## 2017-09-15 DIAGNOSIS — G81.91 RIGHT HEMIPARESIS (H): Primary | ICD-10-CM

## 2017-09-22 DIAGNOSIS — G47.00 INSOMNIA, UNSPECIFIED TYPE: ICD-10-CM

## 2017-09-22 NOTE — TELEPHONE ENCOUNTER
traZODone (DESYREL) 50 MG tablet    1 ordered  Edit     Summary: 1 to 3 tablets, 30 minutes before bedtime, Disp-180 tablet, R-1, E-Prescribe   Start: 3/27/2017  Ord/Sold: 3/27/2017 (O)  Report  Taking:   Long-term:   Pharmacy: [id number] - [place], [place] - [address] AT [facility] [address]  Med Dose History       Patient Si to 3 tablets, 30 minutes before bedtime       Ordered on: 3/27/2017       Authorized by: CARO LOPEZ       Dispense: 180 tablet       Admin Instructions: 1 to 3 tablets, 30 minutes before bedtime          Last Office Visit with [facility], P or  Health prescribing provider:  3-        Last PHQ-9 score on record=   PHQ-9 SCORE 2017   Total Score -   Total Score 13       No results found for: AST  No results found for: ALT

## 2017-09-26 RX ORDER — TRAZODONE HYDROCHLORIDE 50 MG/1
TABLET, FILM COATED ORAL
Qty: 180 TABLET | Refills: 0 | Status: SHIPPED | OUTPATIENT
Start: 2017-09-26 | End: 2017-12-20

## 2017-09-27 DIAGNOSIS — I61.9 HEMORRHAGIC STROKE (H): ICD-10-CM

## 2017-09-27 DIAGNOSIS — G81.91 RIGHT HEMIPARESIS (H): Primary | ICD-10-CM

## 2017-10-10 ENCOUNTER — HOSPITAL ENCOUNTER (OUTPATIENT)
Dept: PHYSICAL THERAPY | Facility: CLINIC | Age: 52
Setting detail: THERAPIES SERIES
End: 2017-10-10
Attending: PHYSICAL MEDICINE & REHABILITATION
Payer: COMMERCIAL

## 2017-10-10 ENCOUNTER — HOSPITAL ENCOUNTER (OUTPATIENT)
Dept: OCCUPATIONAL THERAPY | Facility: CLINIC | Age: 52
Setting detail: THERAPIES SERIES
End: 2017-10-10
Attending: PHYSICAL MEDICINE & REHABILITATION
Payer: COMMERCIAL

## 2017-10-10 PROCEDURE — 40000125 ZZHC STATISTIC OT OUTPT VISIT: Performed by: OCCUPATIONAL THERAPIST

## 2017-10-10 PROCEDURE — 97032 APPL MODALITY 1+ESTIM EA 15: CPT | Mod: GO | Performed by: OCCUPATIONAL THERAPIST

## 2017-10-10 PROCEDURE — 97110 THERAPEUTIC EXERCISES: CPT | Mod: GP | Performed by: PHYSICAL THERAPIST

## 2017-10-10 PROCEDURE — 40000719 ZZHC STATISTIC PT DEPARTMENT NEURO VISIT: Performed by: PHYSICAL THERAPIST

## 2017-10-10 PROCEDURE — 97166 OT EVAL MOD COMPLEX 45 MIN: CPT | Mod: GO | Performed by: OCCUPATIONAL THERAPIST

## 2017-10-10 PROCEDURE — 97161 PT EVAL LOW COMPLEX 20 MIN: CPT | Mod: GP | Performed by: PHYSICAL THERAPIST

## 2017-10-10 PROCEDURE — 97112 NEUROMUSCULAR REEDUCATION: CPT | Mod: GO | Performed by: OCCUPATIONAL THERAPIST

## 2017-10-10 ASSESSMENT — ACTIVITIES OF DAILY LIVING (ADL): IADL_ASSESSMENT/OBSERVATIONS: TO BE FURTHER ASSESSED

## 2017-10-10 NOTE — PROGRESS NOTES
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     Onset of illness/injury or Date of Surgery (end of dec 2016.  )   Surgical/Medical history reviewed Yes   Pertinent history of current problem (include personal factors and/or comorbidities that impact the POC) Mac is here for OT first, returns to this clinic with change of insurance.  Had PT here early 2017 then went to pool at [facility].  Mac reports:  went to [place] every weekend.  we will go all winter.   family is good. Iain is the greatest gustavo.  13 yo son.  wife Shannan.  son is in 9th grade.  I take Krill oil daily /  i only take the bp pill if it is over 130 top number.  I took one last night.  goes to gym 5d/wk.  pool 2 d/wk.     Pertinent Visual History  no contacts/ no glasses.    Ambulation w/ pole   Patient role/Employment history Disabled   Living environment [place]   Current Assistive Devices Standard Cane   Assistive Devices Comments afo but not using, walking pole   Patient/Family Goals Statement walk / what am i missing?  what other exer should i do?     General Information Comments Mac is driving.  goes to [facility] gym:  Monday Wed friday goes to gym at 2pm.  TU/ thurs.  pool and gym.     Fall Risk Screen   Fall screen completed by PT   Per patient - Fall 2 or more times in past year? Yes   Per patient - Fall with injury in past year? No   Timed Up and Go score (seconds) 13.7   Fall screen comments one before then one getting out of bed.  knee gave out.    Pain   Patient currently in pain Yes   Pain rating minimal pain R shoulder and R hip/ from needing to move more.     Vital Signs   Pulse 72   /70   Cognitive Status Examination   Orientation orientation to person, place and time   Level of Consciousness alert    Follows Commands and Answers Questions 100% of the time   Personal Safety and Judgment intact   Memory intact   Integumentary   Integumentary No deficits were identified   Posture   Posture Normal   Range of Motion (ROM)   ROM Comment approx 2-3 deg R dorsifl.  L 10 deg    Strength   Strength Comments Right hip fl gr 4, dorsifl gr 4+.     Bed Mobility   Bed Mobility Comments indep   Transfer Skills   Transfer Comments indep    Locomotion   Wheel Chair Mobility Comments n/a   Gait   Gait Comments increased time on L stance w. slowed R swing through, circumductions R. ( to avoid R foot drag,). trunk tilt L w/ right swing through.   Gait Special Tests   Gait Special Tests DYNAMIC GAIT INDEX   Gait Special Tests 25 Foot Timed Walk   Seconds 11.6   Steps 17 Steps   Comments no AD   Gait Special Tests Dynamic Gait Index   Score out of 24 17   Sensory Examination   Sensory Perception Comments light touch and prop intact.   Coordination   Coordination Comments poor/R ankle for decreased speed by half toe tap vs L   Muscle Tone   Muscle Tone other (describe)   Muscle Tone Comments sl low tone R leg   Planned Therapy Interventions   Planned Therapy Interventions balance training;gait training;neuromuscular re-education;ROM;orthotic fitting/training;strengthening;stretching;other (see comments)   Planned Therapy Interventions Comment ?ktape, try old afo,?FES    Clinical Impression   Criteria for Skilled Therapeutic Interventions Met yes, treatment indicated   PT Diagnosis R side ramírez paresis w/ gait difficulty   Influenced by the following impairments weakness   Functional limitations due to impairments gait quality issues due to weakness   Clinical Presentation Stable/Uncomplicated   Clinical Decision Making (Complexity) Low complexity   Therapy Frequency other (see comments)   Predicted Duration of Therapy Intervention (days/wks) up to 12 x in 12 wk   Risk & Benefits of therapy have been explained Yes   Patient, Family &  other staff in agreement with plan of care Yes   Clinical Impression Comments pt familiar to me/ has improved but needs work on R leg strength and gait quality   Education Assessment   Preferred Learning Style Demonstration   Barriers to Learning Physical   GOALS   PT Eval Goals 1;2;3;4   Goal 1   Goal Identifier gait   Goal Description dgi score to increase to 19/24 for better community amb   Target Date 01/01/18   Goal 2   Goal Identifier gait   Goal Description 25ft walk to improve to 9 sec so can cross streets more safely and occas jog 20ft for avoiding traffic.   Target Date 01/01/18   Goal 3   Goal Identifier HEP   Goal Description pt to add ankle exer to home exer prog to allow for better quality of gait   Target Date 01/01/18   Goal 4   Goal Identifier TUG   Goal Description Tug time to decrease to 10 sec or less for improved gait speed/quality   Target Date 01/01/18   Total Evaluation Time   Total Evaluation Time (Minutes) 30

## 2017-10-10 NOTE — PROGRESS NOTES
10/10/17 1200   Quick Adds   Type of Visit Initial Outpatient Occupational Therapy Evaluation   General Information   Start Of Care Date 10/10/17   Referring Physician Dr. Miguel Beauchamp   Orders Evaluate and treat as indicated   Other Orders OT with Raghav Matute for estim to address hemiparesis as late effect of stroke. Had been getting OT then off for a while with insurance gap, now new insurance and appropriate to continue OT   Orders Date 09/15/17   Medical Diagnosis hemorrhagic stroke with R hemiparesis   Onset of Illness/Injury or Date of Surgery 09/15/17   Surgical/Medical History Reviewed Yes   Additional Occupational Profile Info/Pertinent History of Current Problem Pt was travelling in [place] when presented with profound right sided weakness falling to the ground in hotel room, mild aphasia, R facial droop and admitted to hospital. Was then admitted to acute inpatient rehab in [place] 1/6/17 and Dc to home from there on 1/24/17. He worked FT in "[employer] enjoyed writing music and singing and playing guitar. He is a  and father to 14 y.o. son. He enjoys outdoor activities such as boating and bonfires. Pt is now disabled from post office work.   Role/Living Environment   Current Community Support Family/friend caregiver   Patient role/Employment history Disabled   Community/Avocational Activities Plays guitar, writes music, boats and bonfires at cabin   Current Living Environment House   Number of Stairs to Enter Home yes without rail   Number of Stairs Within Home yes with railing on half of steps   Primary Bathroom Location/Comments main level has half bath and full bath upstairs bathroom   Primary Bathroom Set Up/Equipment Shower stall   Additional Bathroom Set Up/Equipment (not using bench)   Home/Community Accessibility Comments Extended tub bench;Tub grab bar; regular toilets and work fine   Prior Level - Transfers Independent   Prior Level - Ambulation Assistive equipment   Prior Level - ADLS Assistive  equipment   Prior Responsibilities - IADL Driving;Meal Preparation;Housekeeping;Laundry;Shopping;Yardwork;Medication management;Work   Prior Level Comments Also has a lake cabin in [place] without stairs   Current Assistive Devices - Mobility Quad cane;Manual wheelchair   Patient/family Goals Statement use R UE to play guitar again; feed self with right hand and pour milk with right hand; focus on forearm and hand on R for function   Pain   Patient currently in pain No   Fall Risk Screen   Fall screen completed by PT;Refer to Documentation;Department Fall Risk Screen Intervention Implemented   Cognitive Status Examination   Cognitive Comment had aphasia initially which is improved and some higher level thinking changes but advocates for self with good planning to prioritize for his needs.   Visual Perception   Visual Perception Comments WFL   Posture   Posture Comments WFL   Range of Motion (ROM)   ROM Comments L UE is WFL; R shoulder flex to 100 degrees with slight abd and flex of elbow; abd 60 with forearm supinated; scaption 110-uses much trunk hyperextension; elbow flexion and ext full; wrist flex approx 20, ext approx 40; finger ext full but tendency of some slight wrist flexion and index PIP flex with sustained extension, R thumb abd/ext impaired and with effort then PIP flexes in.   Strength   Strength Comments Shoulder flex 3-/5; abd 2-/5; elbow flex 5/5, supination 5/5 and pron 5/5 in isolation but with active use 2-/5 as supinators ?spasticity overpowers, wrist ext 3-/5 and flex 2-/5. Can acieve 6 reps  and release with finger ext declining to less than 50% by 4th- 6th rep   Hand Strength   Hand Dominance Right   Right Hand  (pounds) 8.5 pounds   Right Lateral Pinch (pounds) 5.5 pounds  (needs to position thumb as otherwise it rotates & PIP flexes)   Right Three Point Pinch (pounds) (unable)   Hand Strength Comments L WFL   Coordination   Coordination Comments R impaired gross and fine motor; to be  further assessed; L WFL other than essential tremor   Balance   Balance Comments reduced dynamic   Functional Mobility   Ambulation Ind with walking stick today   Transfer Skill   Level of [place]: Transfers independent   Bathing   Level of [place] - Bathing independent   Bathing Comments stands to shower   Upper Body Dressing   Level of [place]: Dress Upper Body independent   Upper Body Dressing Comments uses R UE throughout as able   Lower Body Dressing   Level of [place]: Dress Lower Body independent   Lower Body Dressing Comments uses R UE throughout as able   Toileting   Level of [place]: Toilet independent   Grooming   Level of [place]: Grooming independent   Grooming Comments uses R UE throughout as able to assist   Eating/Self-Feeding   Level of [place]: Eating independent   Eating/Self Feeding Comments L hand; R can only use if extends elbow and then can keep R forearm pronated and if bends elbow, , then forearm supinates so cannot  food on utensil with overhand grasp of fork   Instrumental Activities of Daily Living Assessment   IADL Assessment/Observations to be further assessed   Planned Therapy Interventions   Planned Therapy Interventions ADL training;IADL training;Balance training;Coordination training;Joint mobilization;Manual therapy;Neuromuscular re-education;Orthotic fitting/training;ROM;Self care/Home management;Strengthening;Stretching;Therapeutic activities   Planned Modalities Electrical stimulation   OT Goal 1   Goal Description Pt will demonstrate consistent finger manipulation and active forearm pronation and alternating supination to allow him to  food on utensil and to pour milk from gallon milk jug per report on 90% of trials with Ind HEP for NMES to pronators   Target Date 01/02/18   OT Goal 2   Goal Description Pt will demonstrate ability reach and place up to 3# items at shoulder height consistently with R UE or bilaterally without  trunk compensation and good trunk stability  75% of trials   Target Date 01/02/18   OT Goal 3   Goal Description Pt will be able to isolate finger and thumb movement on R to allow for use of 3 point pinch to hold pen, pick for guitar and other items with adaptations as needed to allow patient to complete at least 2 ADL tasks using 3 point pinch.   Target Date 01/02/18   Clinical Impression   Criteria for Skilled Therapeutic Interventions Met Yes, treatment indicated   OT Diagnosis Impaired ADL/IADL, leisure and work ability   Influenced by the following impairments IMpairments in R sided strength, ROM, coordination, sensation, tremor in L UE, reduced endurance and fatigue; impaired balance and trunk control   Assessment of Occupational Performance 3-5 Performance Deficits   Identified Performance Deficits Pouring milk, eating with R UE, reaching, playing guitar and other dexterity tasks   Clinical Decision Making (Complexity) Moderate complexity   Therapy Frequency weekly and then when consistent schedule of 3x/wk for FES cycling intensity x 4-6 weeks and taper per progress to 2x/wk   Predicted Duration of Therapy Intervention (days/wks) 12 weeks or greater pending progress made with R UE and trunk   Risks and Benefits of Treatment have been explained. Yes   Patient, Family & other staff in agreement with plan of care Yes   Education Assessment   Barriers To Learning No Barriers   Preferred Learning Style Listening;Reading;Demonstration;Pictures/video   Total Evaluation Time   Total Evaluation Time 20

## 2017-10-10 NOTE — IP AVS SNAPSHOT
MRN:[id number]                      After Visit Summary   10/10/2017    Maldonado Mendiola    MRN: [id number]           Visit Information        Provider Department      10/10/2017  2:00 PM Rochelle Capone, PT [facility] [place], Physical Therapy - Outpatient        Your next 10 appointments already scheduled     Oct 18, 2017 10:00 AM CDT   (Arrive by 9:45 AM)   Return Visit with Miguel Beauchamp MD   [facility] Physical Medicine and Rehabilitation ([facility])    [address] [address] [phone]            Dec 13, 2017  9:20 AM CST   (Arrive by 9:05 AM)   Return Visit with Miguel Beauchamp MD   [facility] Physical Medicine and Rehabilitation ([facility])    [address] [place] [address] [phone]                Further instructions from your care team       10/10/17.    Bring brace, bring ipad, bring boots.  See exercise pics attached.     Rochelle  PT  [phone]    [facility] [place] [facility] Therapy by [facility] Physical Therapy  [address]  [place]  [address]  P: [phone]  F: [phone]    [facility] Aquatic Therapy Pool  [address] [place] [address]  P: [phone]    [place]    Select Therapy  [place]  [address] [place], [place], [address]  P: [phone]    [facility]  [address] [place] [address]  P: [phone]    [facility]   [address] [place], [place]     P: [phone]    [facility]  [address]  [place] [address]  P: [phone]   F: [phone]     [facility]  [address] [place], [place] [address]  P: [phone]    [facility]- (Lifetime Fitness Pool in [place])  Address below is for [facility].  [address]  [facility].   [address]  [place] [address]  P: [phone]    Courdianna Gomez - [facility]  [address] [place] [address]  P: [phone]   F: [phone]    [facility] Therapy   [address] [place], [place], [address]  P: [phone]    [facility] Therapy  [address] [place] [address]  P: [phone]    [place]   Courdianna Gomez - [place]  [address] [place] [place]  [address]  P: [phone]  F: [phone]    [facility] - [place]  [address] [facility], [address]  [place] [address]  P: [phone]    [facility]  Courdianna Gomez  [address]  (Pool at [address])  P: [phone]    [place]   Courdianna Jason   [address] [facility]   P: [phone]    [place]   Courage Jason  [address] [place] [address]   P: [phone]   F: [phone]     [facility] Physical Therapy  [address]  [place]  [address]  P: [phone]  F: [phone]    [place]   [facility] Therapy  [address] [place] [address]   P:  [phone]   F:  [phone]    [facility]ra Physical Therapy - uses hotel pool  [address]  [place]  [address]  P: [phone]  F: [phone]    [facility] [place]  [address] [place] [address]  [address]  [place] [address]  P: [phone]    [facility]  [address] [place] [address]  P: [phone]    Courage Jason [facility]  [address] [facility]  [place] [address]  P: [phone]     [facility]opaedic Center  [address] [place] [address]  P: [phone]    [facility]  [address] [place] [address]  P: [phone]    [place]   Una Gomez  [address] [place], [place]  P: [phone]    [facility] Physical Therapy   [address] [facility],  [address]  [place]  [address]  P: [phone]  F: [phone]    [facility]  [address] [place] [address]    P: [phone]    [facility]  [address] [facility] [place]  P: [phone]    [place]  Select Therapy - [place]  [address] [place], [place], [address]  Phone: [phone]    [place]  [facility]habilitation Services at [facility]  [address] [facility] [place] [address]  P: [phone]    [facility]  [address]  (Pool at [facility], [address] [facility])  P: [phone]    [facility] Neuroscience  [address] [place] [address]  P: [phone]  F: [phone]    [facility]  [address] [place]  [address]  P: [phone]      [facility] Physical Therapy  [address] [place]  [address]  P: [phone]  F: [phone]    [facility] [place] [facility]  [address] [place] [address]                                P: [phone]                            F: [phone]                               [facility]   [address] [facility], [place] [address]  P: [phone]   F: [phone]      MyChart Information     [facility] gives you secure access to your electronic health record. If you see a primary care provider, you can also send messages to your care team and make appointments. If you have questions, please call your primary care clinic.  If you do not have a primary care provider, please call [phone] and they will assist you.        Care EveryWhere ID     This is your Care EveryWhere ID. This could be used by other organizations to access your [place] medical records  [id number]        Equal Access to Services     CLAUDIO CARTY AH: iarj Babinda  patricia hernadez waxay idiin hayaan adeeg kharash la'aan ah. So [facility] [phone].    ATENCIÓN: Si habla español, tiene a naik disposición servicios gratuitos de asistencia lingüística. Llame al [phone].    We comply with applicable federal civil rights laws and [place] laws. We do not discriminate on the basis of race, color, national origin, age, disability, sex, sexual orientation, or gender identity.

## 2017-10-10 NOTE — DISCHARGE INSTRUCTIONS
10/10/17.    Bring brace, bring ipad, bring boots.  See exercise pics attached.     Rochelle  PT  [phone]    [place] [place] [facility] Therapy by [facility] Physical Therapy  [address]  [place]  [address]  P: [phone]  F: [phone]    [facility] Aquatic Therapy Pool  [address] [place] [address]  P: [phone]    [place]    Select Therapy  [place]  [address] [place], [place], [address]  P: [phone]    [facility]opaedic Center  [address] [place] [address]  P: [phone]    [facility] Fitness Center   [address] [facility], [place]     P: [phone]    [facility]  [address] [facility], [address]  [place] [address]  P: [phone]   F: [phone]     [facility]  [address] [facility], [place] [address]  P: [phone]    [facility]- (Lifetime Fitness Pool in [place])  Address below is for [facility].  [address] [facility].   [address]  [place] [address]  P: [phone]    Una Gomez  Nicole COOPER [facility]  [address] [place] [address]  P: [phone]   F: [phone]    [facility]    Select Therapy   [address] [place], [place], [address]  P: [phone]    [facility] Therapy  [address] [place] [address]  P: [phone]    [place]   Courage Jason [facility]  [address] [place]  [address]  P: [phone]  F: [phone]    [facility] [place]  [address] [facility]  [place] [address]  P: [phone]    Jim Gomez  [address]  (Pool at [address])  P: [phone]    [place]   Una Gomez   [address] [facility]   P: [phone]    [place]   Una Gomez  [address] [place] [address]   P: [phone]   F:  [phone]     [facility] Physical Therapy  [address]  [place]  [address]  P: [phone]  F: [phone]    [place]   [place] Therapy  [address] [place] [address]   P:  [phone]   F:  [phone]    [facility] Physical Therapy - uses hotel pool  [address]  [place]  [address]  P: [phone]  F: [phone]    [facility]  [address] [facility]  [address]  [place] [address]  P: [phone]    [facility]  [address] [place] [address]  P: [phone]    [facility]  [address]  [place] [address]  P: [phone]     [facility] Orthopaedic [place]  [address] [place] [address]  P: [phone]    [facility]  [address] [place] [address]  P: [phone]    [facility] [place]  [address] [place], [place]  P: [phone]    [facility] Therapy   [address] [place]  [place]  [address]  P: [phone]  F: [phone]    [facility]  [address] [place] [address]    P: [phone]    [facility]  [address] [place], [place]  P: [phone]    [place]  [facility] - [place]  [address] [place], [place], [address]  Phone: [phone]    [place]  Rehabilitation Services at [facility]  [address] [facility] [place] [address]  P: [phone]    [place]  [facility]  [address]  (Pool at [facility], [address] [facility])  P: [phone]    [facility] Neuroscience  [address] [place] [address]  P: [phone]  F: [phone]    [facility]  [address] [place]  [address]  P: [phone]      [facility]  Therapy  [address] [facility] [place]  [address]  P: [phone]  F: [phone]    [facility]  [address]                        [place] [address]                                P: [phone]                            F: [phone]                               [facility]   [address] [facility], [place] [address]  P: [phone]   F: [phone]

## 2017-10-11 ENCOUNTER — TELEPHONE (OUTPATIENT)
Dept: NEUROLOGY | Facility: CLINIC | Age: 52
End: 2017-10-11

## 2017-10-11 NOTE — TELEPHONE ENCOUNTER
Contacted Mac at [employer] - requested release of information to speak with him - this will be faxed.

## 2017-10-11 NOTE — TELEPHONE ENCOUNTER
----- Message from Kayla Liu LPN sent at 10/11/2017  1:46 PM CDT -----  Regarding: question  Caller name: Mac from [employer] [facility] [phone]    Treating provider/specialty: [place]    Nurse:    Best time to return call:    Message left?     Description of issue/question:  has questions regarding  07/25 office visit

## 2017-10-16 NOTE — TELEPHONE ENCOUNTER
Release of information received and scanned to chart. [facility] for Mac to return call.

## 2017-10-17 ENCOUNTER — HOSPITAL ENCOUNTER (OUTPATIENT)
Dept: PHYSICAL THERAPY | Facility: CLINIC | Age: 52
Setting detail: THERAPIES SERIES
End: 2017-10-17
Attending: PHYSICAL MEDICINE & REHABILITATION
Payer: COMMERCIAL

## 2017-10-17 PROCEDURE — 97116 GAIT TRAINING THERAPY: CPT | Mod: GP | Performed by: PHYSICAL THERAPIST

## 2017-10-17 PROCEDURE — 40000719 ZZHC STATISTIC PT DEPARTMENT NEURO VISIT: Performed by: PHYSICAL THERAPIST

## 2017-10-18 ENCOUNTER — OFFICE VISIT (OUTPATIENT)
Dept: PHYSICAL MEDICINE AND REHAB | Facility: CLINIC | Age: 52
End: 2017-10-18

## 2017-10-18 VITALS
TEMPERATURE: 98.2 F | HEART RATE: 90 BPM | DIASTOLIC BLOOD PRESSURE: 67 MMHG | WEIGHT: 160.4 LBS | RESPIRATION RATE: 20 BRPM | OXYGEN SATURATION: 95 % | SYSTOLIC BLOOD PRESSURE: 129 MMHG | BODY MASS INDEX: 21.73 KG/M2 | HEIGHT: 72 IN

## 2017-10-18 DIAGNOSIS — I69.351 HEMIPARESIS AFFECTING RIGHT SIDE AS LATE EFFECT OF CEREBROVASCULAR ACCIDENT (H): Primary | ICD-10-CM

## 2017-10-18 RX ORDER — BIOTIN 5 MG
2 TABLET ORAL DAILY
COMMUNITY
End: 2018-05-10

## 2017-10-18 ASSESSMENT — PAIN SCALES - GENERAL: PAINLEVEL: NO PAIN (0)

## 2017-10-18 NOTE — MR AVS SNAPSHOT
After Visit Summary   10/18/2017    Maldonado Mendiola    MRN: [id number]           Patient Information     Date Of Birth          1965        Visit Information        Provider Department      10/18/2017 10:00 AM Miguel Beauchamp MD [facility] Physical Medicine and Rehabilitation         Follow-ups after your visit        Your next 10 appointments already scheduled     Oct 26, 2017  8:45 AM CDT   Neuro Treatment with Rochelle Capone, PT   [facility], [place], Physical Therapy - Outpatient ([facility])    [address], [address]  [place] [address]   [phone]            Oct 31, 2017  9:00 AM CDT   Neuro Treatment with Laura Johns, OT   [facility] [place], Occupational Therapy - Outpatient ([facility])    [address], [address]  [place] [address]   [phone]            Oct 31, 2017 10:15 AM CDT   Neuro Treatment with Rochelle Capone, PT   [facility] [place], Physical Therapy - Outpatient ([facility])    [address], [address]  [place] [address]   [phone]            Nov 01, 2017  9:30 AM CDT   Neuro Treatment with Laura Johns, OT   [facility]    [address], [address]  [place] [address]   [phone]            Nov 03, 2017 10:00 AM CDT   Neuro Treatment with Raghav Matute, OT   [facility]    [address], [address]  [place] [address]   [phone]            Nov 06, 2017 11:30 AM CST   Neuro Treatment with Laura Johns, OT   [facility]    [address], [address]  [place] [address]   [phone]            Nov 08, 2017 10:00 AM  CST   Neuro Treatment with Laura Johns, OT   [facility], [place], Occupational Therapy - Outpatient ([facility])    [address]  [place] [address]   [phone]            Nov 08, 2017 11:00 AM CST   Neuro Treatment with Rochelle Capone, PT   [facility], [place], Physical Therapy - Outpatient ([facility])    [address]  [place] [address]   [phone]            Nov 10, 2017 11:15 AM CST   Neuro Treatment with Raghav Matute, OT   [facility], [place], Occupational Therapy - Outpatient ([facility])    [address]  [place] [address]   [phone]            Nov 13, 2017 10:00 AM CST   Neuro Treatment with Laura Johns, OT   [facility] [place], Occupational Therapy - Outpatient ([facility])    [address]  [place] [address]   [phone]              Who to contact     Please call your clinic at [phone] to:    Ask questions about your health    Make or cancel appointments    Discuss your medicines    Learn about your test results    Speak to your doctor   If you have compliments or concerns about an experience at your clinic, or if you wish to file a complaint, please contact [facility] Patient Relations at [phone] or email us at [email or web address]         Additional Information About Your Visit        [employer] Information     [employer] gives you secure access to your electronic health record. If you see a primary care provider, you can also send messages to your care team and make appointments. If you have questions, please call your primary care clinic.  If you do not have a primary care provider, please call [phone] and they will assist you.      [employer] is an electronic gateway that provides easy, online access to  your medical records. With "[employer] you can request a clinic appointment, read your test results, renew a prescription or communicate with your care team.     To access your existing account, please contact your [facility] or call [phone] for assistance.        Care EveryWhere ID     This is your Care EveryWhere ID. This could be used by other organizations to access your [facility] medical records  [id number]        Your Vitals Were     Pulse Temperature Respirations Height Pulse Oximetry BMI (Body Mass Index)    90 98.2  F (36.8  C) 20 1.829 m (6') 95% 21.75 kg/m2       Blood Pressure from Last 3 Encounters:   10/18/17 129/67   07/25/17 127/72   07/21/17 (!) 123/92    Weight from Last 3 Encounters:   10/18/17 72.8 kg (160 lb 6.4 oz)   07/21/17 74.7 kg (164 lb 10.9 oz)   07/20/17 74.7 kg (164 lb 9.6 oz)              Today, you had the following     No orders found for display       Primary Care Provider Office Phone # Fax #    Edmond Cano PA-C [phone]       [address] [address]        Equal Access to Services     CLAUDIO CARTY : Hadii aad ku hadasho Soomaali, waaxda luqadaha, qaybta kaalmada adeegyada, waxay idiin haysrinathn adelillian garcia larubi mccormack. So [facility] [place] [phone].    ATENCIÓN: Si habla español, tiene a naik disposición servicios gratuitos de asistencia lingüística. Llame al [phone].    We comply with applicable federal civil rights laws and [place] laws. We do not discriminate on the basis of race, color, national origin, age, disability, sex, sexual orientation, or gender identity.            Thank you!     Thank you for choosing [facility] PHYSICAL MEDICINE AND REHABILITATION  for your care. Our goal is always to provide you with excellent care. Hearing back from our patients is one way we can continue to improve our services. Please take a few minutes to complete the written survey that you may receive in the mail after your  visit with us. Thank you!             Your Updated Medication List - Protect others around you: Learn how to safely use, store and throw away your medicines at www.disposemymeds.org.          This list is accurate as of: 10/18/17 11:04 AM.  Always use your most recent med list.                   Brand Name Dispense Instructions for use Diagnosis    Krill Oil 1000 MG Caps      Take 2 capsules by mouth daily        lisinopril 10 MG tablet    PRINIVIL/ZESTRIL    90 tablet    1 tab daily    Hemorrhagic stroke (H), Right hemiparesis (H), Dysarthria, Hypertension goal BP (blood pressure) < 140/90       traZODone 50 MG tablet    DESYREL    180 tablet    TAKE 1 TO 3 TABLETS BY MOUTH EVERY NIGHT 30 MINUTES BEFORE BEDTIME    Insomnia, unspecified type

## 2017-10-18 NOTE — LETTER
10/18/2017       RE: Maldonado Mendiola  [address] [facility] [address]     Dear Colleague,    Thank you for referring your patient, Maldonado Mendiola, to the [facility] PHYSICAL MEDICINE AND REHABILITATION at [facility]. Please see a copy of my visit note below.    Physical medicine report patient clinic note:    Mac is a 52-year-old with history of left basal ganglia hemorrhagic stroke in January, now about 10 months out. Returns to clinic having last seen me in May. Since that time his continued to participate in outpatient therapies and has made some slight incremental improvements. He continues to participate with good maintenance exercise between his therapies. He is starting to use functional electrical stimulation bike with the right arm outlined for 3 times per week for the next 5 weeks. He continues with walking goals. He is participating in aquatic therapy almost every day for an hour.    He continues to have deficits with right hand and arm correlation but there is some movements. He is able to do ambulation but the pace is a bit slower and he needs seated rest breaks. He has been covered under disability policy with his work though they are now pushing for his application for federal disability.    Physical exam:  /67  Pulse 90  Temp 98.2  F (36.8  C)  Resp 20  Ht 6' (1.829 m)  Wt 160 lb 6.4 oz (72.8 kg)  SpO2 95%  BMI 21.75 kg/m2  Huy is fully alert pleasant good historian with fluent speech and language.  He writes and with a reciprocal gait pattern though there is some asymmetry to this. Some reduced swing and clearance of the right foot. He has some stance phase genu recurvatum at times but does work towards controlling softer knee with stance loading.  He is able to move some of his hands and fingers with full flexion and some  and starting to do a bit better finger individuation. Right shoulder and elbow movements are pressure improved and  has almost full abduction lacking about 20      Assessment and recommendations:  1. Mac has made some definite improvements though this far out from the stroke the rate of progress is definitely slower. I do believe he will have some permanent deficits but I also believe that there is potential to eat count slightly improved function and coordination. I believe it's appropriate to continue pursuing the functional electrical stimulation bike as above to the right upper extremity.  2. Mac is excellent with the maintenance exercise program no recommendations in this domain.  3. 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 I do not believe this is reasonable for him to do at this time nor do I expect that in his future. I believe that any position at this point would require considerable accommodations and likely not consistent with the essential job functions in that roll. He is applying for federal disability. I did complete paperwork for his long-term disability insurance today while he was still in clinic.  4. I will follow up with Mac in about one years time though he stiffly encouraged to contact sooner functional her rehabilitation issues arise.    30 minutes spent in direct patient interaction, greater than 50% counseling and educating on the above detailed items.     Again, thank you for allowing me to participate in the care of your patient.      Sincerely,    Miguel Beauchamp MD

## 2017-10-24 NOTE — ADDENDUM NOTE
Encounter addended by: Jessie Skaggs, SLP on: 10/24/2017  2:59 PM<BR>     Actions taken: Flowsheet accepted, Episode resolved

## 2017-10-25 ENCOUNTER — CARE COORDINATION (OUTPATIENT)
Dept: PHYSICAL MEDICINE AND REHAB | Facility: CLINIC | Age: 52
End: 2017-10-25

## 2017-10-25 PROBLEM — I69.351 HEMIPARESIS AFFECTING RIGHT SIDE AS LATE EFFECT OF CEREBROVASCULAR ACCIDENT (H): Status: ACTIVE | Noted: 2017-10-25

## 2017-10-25 NOTE — PROGRESS NOTES
The Attending Physicians Statement for Disability was completed and signed by Dr Beauchamp and [phone] along with patient's signed release of information.

## 2017-10-26 ENCOUNTER — CARE COORDINATION (OUTPATIENT)
Dept: PHYSICAL MEDICINE AND REHAB | Facility: CLINIC | Age: 52
End: 2017-10-26

## 2017-10-26 ENCOUNTER — HOSPITAL ENCOUNTER (OUTPATIENT)
Dept: PHYSICAL THERAPY | Facility: CLINIC | Age: 52
Setting detail: THERAPIES SERIES
End: 2017-10-26
Attending: PHYSICAL MEDICINE & REHABILITATION
Payer: COMMERCIAL

## 2017-10-26 PROCEDURE — 40000719 ZZHC STATISTIC PT DEPARTMENT NEURO VISIT: Performed by: PHYSICAL THERAPIST

## 2017-10-26 PROCEDURE — 97110 THERAPEUTIC EXERCISES: CPT | Mod: GP | Performed by: PHYSICAL THERAPIST

## 2017-10-26 PROCEDURE — 97116 GAIT TRAINING THERAPY: CPT | Mod: GP | Performed by: PHYSICAL THERAPIST

## 2017-10-26 NOTE — PROGRESS NOTES
Dr Beauchamp's PMR clinic note dated 10/18/17 was faxed to [facility] [phone] along with patient's signed release of information.  Claim number [id number].

## 2017-10-26 NOTE — PROGRESS NOTES
Physical medicine report patient clinic note:    Mac is a 52-year-old with history of left basal ganglia hemorrhagic stroke in January, now about 10 months out. Returns to clinic having last seen me in May. Since that time his continued to participate in outpatient therapies and has made some slight incremental improvements. He continues to participate with good maintenance exercise between his therapies. He is starting to use functional electrical stimulation bike with the right arm outlined for 3 times per week for the next 5 weeks. He continues with walking goals. He is participating in aquatic therapy almost every day for an hour.    He continues to have deficits with right hand and arm correlation but there is some movements. He is able to do ambulation but the pace is a bit slower and he needs seated rest breaks. He has been covered under disability policy with his work though they are now pushing for his application for federal disability.    Physical exam:  /67  Pulse 90  Temp 98.2  F (36.8  C)  Resp 20  Ht 6' (1.829 m)  Wt 160 lb 6.4 oz (72.8 kg)  SpO2 95%  BMI 21.75 kg/m2  Huy is fully alert pleasant good historian with fluent speech and language.  He writes and with a reciprocal gait pattern though there is some asymmetry to this. Some reduced swing and clearance of the right foot. He has some stance phase genu recurvatum at times but does work towards controlling softer knee with stance loading.  He is able to move some of his hands and fingers with full flexion and some  and starting to do a bit better finger individuation. Right shoulder and elbow movements are pressure improved and has almost full abduction lacking about 20      Assessment and recommendations:  1. Mac has made some definite improvements though this far out from the stroke the rate of progress is definitely slower. I do believe he will have some permanent deficits but I also believe that there is potential to eat  count slightly improved function and coordination. I believe it's appropriate to continue pursuing the functional electrical stimulation bike as above to the right upper extremity.  2. Mac is excellent with the maintenance exercise program no recommendations in this domain.  3. 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 I do not believe this is reasonable for him to do at this time nor do I expect that in his future. I believe that any position at this point would require considerable accommodations and likely not consistent with the essential job functions in that roll. He is applying for federal disability. I did complete paperwork for his long-term disability insurance today while he was still in clinic.  4. I will follow up with Mac in about one years time though he stiffly encouraged to contact sooner functional her rehabilitation issues arise.    30 minutes spent in direct patient interaction, greater than 50% counseling and educating on the above detailed items.

## 2017-10-31 ENCOUNTER — HOSPITAL ENCOUNTER (OUTPATIENT)
Dept: OCCUPATIONAL THERAPY | Facility: CLINIC | Age: 52
Setting detail: THERAPIES SERIES
End: 2017-10-31
Attending: PHYSICAL MEDICINE & REHABILITATION
Payer: COMMERCIAL

## 2017-10-31 ENCOUNTER — HOSPITAL ENCOUNTER (OUTPATIENT)
Dept: PHYSICAL THERAPY | Facility: CLINIC | Age: 52
Setting detail: THERAPIES SERIES
End: 2017-10-31
Attending: PHYSICAL MEDICINE & REHABILITATION
Payer: COMMERCIAL

## 2017-10-31 PROCEDURE — 97530 THERAPEUTIC ACTIVITIES: CPT | Mod: GO | Performed by: OCCUPATIONAL THERAPIST

## 2017-10-31 PROCEDURE — 97032 APPL MODALITY 1+ESTIM EA 15: CPT | Mod: GP | Performed by: PHYSICAL THERAPIST

## 2017-10-31 PROCEDURE — 40000719 ZZHC STATISTIC PT DEPARTMENT NEURO VISIT: Performed by: PHYSICAL THERAPIST

## 2017-10-31 PROCEDURE — 97112 NEUROMUSCULAR REEDUCATION: CPT | Mod: GP | Performed by: PHYSICAL THERAPIST

## 2017-10-31 PROCEDURE — 97110 THERAPEUTIC EXERCISES: CPT | Mod: GP | Performed by: PHYSICAL THERAPIST

## 2017-10-31 PROCEDURE — 40000125 ZZHC STATISTIC OT OUTPT VISIT: Performed by: OCCUPATIONAL THERAPIST

## 2017-11-01 ENCOUNTER — HOSPITAL ENCOUNTER (OUTPATIENT)
Dept: OCCUPATIONAL THERAPY | Facility: CLINIC | Age: 52
Setting detail: THERAPIES SERIES
End: 2017-11-01
Attending: PHYSICAL MEDICINE & REHABILITATION
Payer: COMMERCIAL

## 2017-11-01 PROCEDURE — 97112 NEUROMUSCULAR REEDUCATION: CPT | Mod: GO | Performed by: OCCUPATIONAL THERAPIST

## 2017-11-01 PROCEDURE — 40000125 ZZHC STATISTIC OT OUTPT VISIT: Performed by: OCCUPATIONAL THERAPIST

## 2017-11-03 ENCOUNTER — HOSPITAL ENCOUNTER (OUTPATIENT)
Dept: OCCUPATIONAL THERAPY | Facility: CLINIC | Age: 52
Setting detail: THERAPIES SERIES
End: 2017-11-03
Attending: PHYSICAL MEDICINE & REHABILITATION
Payer: COMMERCIAL

## 2017-11-03 PROCEDURE — 97112 NEUROMUSCULAR REEDUCATION: CPT | Mod: GO | Performed by: OCCUPATIONAL THERAPIST

## 2017-11-03 PROCEDURE — 40000125 ZZHC STATISTIC OT OUTPT VISIT: Performed by: OCCUPATIONAL THERAPIST

## 2017-11-03 PROCEDURE — 97032 APPL MODALITY 1+ESTIM EA 15: CPT | Mod: GO | Performed by: OCCUPATIONAL THERAPIST

## 2017-11-06 ENCOUNTER — HOSPITAL ENCOUNTER (OUTPATIENT)
Dept: OCCUPATIONAL THERAPY | Facility: CLINIC | Age: 52
Setting detail: THERAPIES SERIES
End: 2017-11-06
Attending: PHYSICAL MEDICINE & REHABILITATION
Payer: COMMERCIAL

## 2017-11-06 PROCEDURE — 40000125 ZZHC STATISTIC OT OUTPT VISIT: Performed by: OCCUPATIONAL THERAPIST

## 2017-11-06 PROCEDURE — 97112 NEUROMUSCULAR REEDUCATION: CPT | Mod: GO | Performed by: OCCUPATIONAL THERAPIST

## 2017-11-06 PROCEDURE — 97032 APPL MODALITY 1+ESTIM EA 15: CPT | Mod: GO | Performed by: OCCUPATIONAL THERAPIST

## 2017-11-08 ENCOUNTER — HOSPITAL ENCOUNTER (OUTPATIENT)
Dept: PHYSICAL THERAPY | Facility: CLINIC | Age: 52
Setting detail: THERAPIES SERIES
End: 2017-11-08
Attending: PHYSICAL MEDICINE & REHABILITATION
Payer: COMMERCIAL

## 2017-11-08 ENCOUNTER — HOSPITAL ENCOUNTER (OUTPATIENT)
Dept: OCCUPATIONAL THERAPY | Facility: CLINIC | Age: 52
Setting detail: THERAPIES SERIES
End: 2017-11-08
Attending: PHYSICAL MEDICINE & REHABILITATION
Payer: COMMERCIAL

## 2017-11-08 PROCEDURE — 40000125 ZZHC STATISTIC OT OUTPT VISIT: Performed by: OCCUPATIONAL THERAPIST

## 2017-11-08 PROCEDURE — 97112 NEUROMUSCULAR REEDUCATION: CPT | Mod: GO | Performed by: OCCUPATIONAL THERAPIST

## 2017-11-08 PROCEDURE — 40000719 ZZHC STATISTIC PT DEPARTMENT NEURO VISIT: Performed by: PHYSICAL THERAPIST

## 2017-11-08 PROCEDURE — 97032 APPL MODALITY 1+ESTIM EA 15: CPT | Mod: GO | Performed by: OCCUPATIONAL THERAPIST

## 2017-11-08 PROCEDURE — 97110 THERAPEUTIC EXERCISES: CPT | Mod: GP | Performed by: PHYSICAL THERAPIST

## 2017-11-08 PROCEDURE — 97116 GAIT TRAINING THERAPY: CPT | Mod: GP | Performed by: PHYSICAL THERAPIST

## 2017-11-10 ENCOUNTER — HOSPITAL ENCOUNTER (OUTPATIENT)
Dept: OCCUPATIONAL THERAPY | Facility: CLINIC | Age: 52
Setting detail: THERAPIES SERIES
End: 2017-11-10
Attending: PHYSICAL MEDICINE & REHABILITATION
Payer: COMMERCIAL

## 2017-11-10 PROCEDURE — 97032 APPL MODALITY 1+ESTIM EA 15: CPT | Mod: GO | Performed by: OCCUPATIONAL THERAPIST

## 2017-11-10 PROCEDURE — 40000125 ZZHC STATISTIC OT OUTPT VISIT: Performed by: OCCUPATIONAL THERAPIST

## 2017-11-10 PROCEDURE — 97112 NEUROMUSCULAR REEDUCATION: CPT | Mod: GO | Performed by: OCCUPATIONAL THERAPIST

## 2017-11-13 ENCOUNTER — HOSPITAL ENCOUNTER (OUTPATIENT)
Dept: OCCUPATIONAL THERAPY | Facility: CLINIC | Age: 52
Setting detail: THERAPIES SERIES
End: 2017-11-13
Attending: PHYSICAL MEDICINE & REHABILITATION
Payer: COMMERCIAL

## 2017-11-13 PROCEDURE — 40000125 ZZHC STATISTIC OT OUTPT VISIT: Performed by: OCCUPATIONAL THERAPIST

## 2017-11-13 PROCEDURE — 97112 NEUROMUSCULAR REEDUCATION: CPT | Mod: GO | Performed by: OCCUPATIONAL THERAPIST

## 2017-11-13 PROCEDURE — 97110 THERAPEUTIC EXERCISES: CPT | Mod: GO | Performed by: OCCUPATIONAL THERAPIST

## 2017-11-13 PROCEDURE — 97032 APPL MODALITY 1+ESTIM EA 15: CPT | Mod: GO | Performed by: OCCUPATIONAL THERAPIST

## 2017-11-14 NOTE — ADDENDUM NOTE
Encounter addended by: Raghav Matute OT on: 11/14/2017  7:49 AM<BR>     Actions taken: Flowsheet data copied forward, Flowsheet accepted

## 2017-11-15 ENCOUNTER — HOSPITAL ENCOUNTER (OUTPATIENT)
Dept: OCCUPATIONAL THERAPY | Facility: CLINIC | Age: 52
Setting detail: THERAPIES SERIES
End: 2017-11-15
Attending: PHYSICAL MEDICINE & REHABILITATION
Payer: COMMERCIAL

## 2017-11-15 ENCOUNTER — HOSPITAL ENCOUNTER (OUTPATIENT)
Dept: PHYSICAL THERAPY | Facility: CLINIC | Age: 52
Setting detail: THERAPIES SERIES
End: 2017-11-15
Attending: PHYSICAL MEDICINE & REHABILITATION
Payer: COMMERCIAL

## 2017-11-15 PROCEDURE — 97116 GAIT TRAINING THERAPY: CPT | Mod: GP | Performed by: PHYSICAL THERAPIST

## 2017-11-15 PROCEDURE — 97112 NEUROMUSCULAR REEDUCATION: CPT | Mod: GO | Performed by: OCCUPATIONAL THERAPIST

## 2017-11-15 PROCEDURE — 40000125 ZZHC STATISTIC OT OUTPT VISIT: Performed by: OCCUPATIONAL THERAPIST

## 2017-11-15 PROCEDURE — 40000719 ZZHC STATISTIC PT DEPARTMENT NEURO VISIT: Performed by: PHYSICAL THERAPIST

## 2017-11-15 PROCEDURE — 97110 THERAPEUTIC EXERCISES: CPT | Mod: GP | Performed by: PHYSICAL THERAPIST

## 2017-11-15 PROCEDURE — 97032 APPL MODALITY 1+ESTIM EA 15: CPT | Mod: GO | Performed by: OCCUPATIONAL THERAPIST

## 2017-11-17 ENCOUNTER — HOSPITAL ENCOUNTER (OUTPATIENT)
Dept: OCCUPATIONAL THERAPY | Facility: CLINIC | Age: 52
Setting detail: THERAPIES SERIES
End: 2017-11-17
Attending: PHYSICAL MEDICINE & REHABILITATION
Payer: COMMERCIAL

## 2017-11-17 PROCEDURE — 97110 THERAPEUTIC EXERCISES: CPT | Mod: GO | Performed by: OCCUPATIONAL THERAPIST

## 2017-11-17 PROCEDURE — 40000125 ZZHC STATISTIC OT OUTPT VISIT: Performed by: OCCUPATIONAL THERAPIST

## 2017-11-17 PROCEDURE — 97112 NEUROMUSCULAR REEDUCATION: CPT | Mod: GO | Performed by: OCCUPATIONAL THERAPIST

## 2017-11-20 ENCOUNTER — HOSPITAL ENCOUNTER (OUTPATIENT)
Dept: OCCUPATIONAL THERAPY | Facility: CLINIC | Age: 52
Setting detail: THERAPIES SERIES
End: 2017-11-20
Attending: PHYSICAL MEDICINE & REHABILITATION
Payer: COMMERCIAL

## 2017-11-20 PROCEDURE — 97112 NEUROMUSCULAR REEDUCATION: CPT | Mod: GO | Performed by: OCCUPATIONAL THERAPIST

## 2017-11-20 PROCEDURE — 40000125 ZZHC STATISTIC OT OUTPT VISIT: Performed by: OCCUPATIONAL THERAPIST

## 2017-11-20 PROCEDURE — 97110 THERAPEUTIC EXERCISES: CPT | Mod: GO | Performed by: OCCUPATIONAL THERAPIST

## 2017-11-20 NOTE — IP AVS SNAPSHOT
MRN:[id number]                      After Visit Summary   11/20/2017    Maldonado Mendiola    MRN: [id number]           Visit Information        Provider Department      11/20/2017 10:00 AM Raghav Matute, OT [facility], [place], Occupational Therapy - Outpatient        Your next 10 appointments already scheduled     Nov 22, 2017 10:00 AM CST   Neuro Treatment with Laura Johns, OT   [facility] [place], Occupational Therapy - Outpatient ([facility])    [address]  [place] [address]   [phone]            Nov 22, 2017 11:00 AM CST   Neuro Treatment with Rochelle Capone, PT   [facility], [place], Physical Therapy - Outpatient ([facility])    [address]  [place] [address]   [phone]            Nov 27, 2017 10:00 AM CST   Neuro Treatment with Laura Johns, OT   [facility] [place], Occupational Therapy - Outpatient ([facility])    [address]  [place] [address]   [phone]            Nov 29, 2017 10:30 AM CST   Neuro Treatment with Laura Johns, OT   [facility] [place], Occupational Therapy - Outpatient ([facility])    [address]  [place] [address]   [phone]            Dec 01, 2017 11:00 AM CST   Neuro Treatment with Raghav Matute, OT   [facility] [place], Occupational Therapy - Outpatient ([facility])    [address]  [place] [address]   [phone]            Oct 17, 2018 10:20 AM CDT   (Arrive by 10:05 AM)   Return Visit with Miguel Beauchamp MD   [facility] Physical Medicine and Rehabilitation ([facility])    [address] [place] [id number] [phone]                Further instructions from your care team       Mac's home  program  Goal: Improve trunk and right shoulder girdle stability for smoother, controlled functional reach without compensation (elbow popping up, ribs coming up, shoulder blade winging, fatigue, effort)    Seated side to side weight shifts  To Right: lengthen right side and shorten left  To Left: lengthen L side and shorten right    Ways to progress over time:  - Increase to 20-30 reps smooth control  - try in sideyling on each side and work toward 20-30 reps each side  - try sitting on a therapy ball      Shoulder punch to the ryan (lying on back)    Arm straight pointed to ceiling, punch upwards    Ways to progress:  Add yellow band, lay on it and hold with left hand and have on outer side right arm-progress from 10- 30 reps  Later try red band or 2# weight      Backward arm lifts (lying on stomach)    Arm straight lift and squeeze shoulder blade back and down toward tailbone, 20-30 reps  Arm bent and lift elbow back and up and squeeze shoulder blade back and down, 20-30 reps (Row)    Ways to progress:  -Add 1/2# to 1# weight  -Do standing using red band hooked on door to pull back (arm straight and arm bent as above)      Stop sign (seated with right elbow on table to side)    Right arm bent at 90 degree angle, elbow on towel on table, lift back of hand/forearm up and back, squeeze shoulder blade back and down, 10-30 reps  Use cane to support right arm as get further back with hand-palm should face forward, hand should stay out at 90 degree angle  Squeeze shoulder blade back and down    Ways to progress:  -add 1/2# to 1# weight  -try without elbow supported on table      Shoulder lifts (lying on back)    Add abdominals-ribs down, contract abs, then lift arm, forearm rotated so thumb faces ceiling, keep elbow tucked in, 20-30 reps    Ways to progress:  -add 1/2# to 1# weight      Walking stick circles (standing or seated)    Hold walking stick with right hand and tip on floor  Make circles both directions  push  forward straight and pull back  Push to side straight and pull back in    Ways to progress:  Place stick up on chair, bed or sofa  Place on unstable ball  -add 2-3# wrap weight to stick    Have fun!    Raghav Matute OTR/MURRAY, MSCS  Occupational Therapy  [facility] Out[facility] Rehabilitation Services  [address]  [place] [address]  Voice mail:[phone]  Fax: [phone]          MyChart Information     [employer] gives you secure access to your electronic health record. If you see a primary care provider, you can also send messages to your care team and make appointments. If you have questions, please call your primary care clinic.  If you do not have a primary care provider, please call [phone] and they will assist you.        Care EveryWhere ID     This is your Care EveryWhere ID. This could be used by other organizations to access your [facility] medical records  [id number]        Equal Access to Services     CLAUDIO CARTY AH: Hadii paz Jennings, waaxda lugoldenadaha, qaybta kaalmada smith, paul mccormack. So [facility] [phone].    ATENCIÓN: Si habla español, tiene a naik disposición servicios gratuitos de asistencia lingüística. Llame al [phone].    We comply with applicable federal civil rights laws and [place] laws. We do not discriminate on the basis of race, color, national origin, age, disability, sex, sexual orientation, or gender identity.

## 2017-11-20 NOTE — DISCHARGE INSTRUCTIONS
Mac's home program  Goal: Improve trunk and right shoulder girdle stability for smoother, controlled functional reach without compensation (elbow popping up, ribs coming up, shoulder blade winging, fatigue, effort)    Seated side to side weight shifts  To Right: lengthen right side and shorten left  To Left: lengthen L side and shorten right    Ways to progress over time:  - Increase to 20-30 reps smooth control  - try in sideyling on each side and work toward 20-30 reps each side  - try sitting on a therapy ball      Shoulder punch to the ryan (lying on back)    Arm straight pointed to ceiling, punch upwards    Ways to progress:  Add yellow band, lay on it and hold with left hand and have on outer side right arm-progress from 10- 30 reps  Later try red band or 2# weight      Backward arm lifts (lying on stomach)    Arm straight lift and squeeze shoulder blade back and down toward tailbone, 20-30 reps  Arm bent and lift elbow back and up and squeeze shoulder blade back and down, 20-30 reps (Row)    Ways to progress:  -Add 1/2# to 1# weight  -Do standing using red band hooked on door to pull back (arm straight and arm bent as above)      Stop sign (seated with right elbow on table to side)    Right arm bent at 90 degree angle, elbow on towel on table, lift back of hand/forearm up and back, squeeze shoulder blade back and down, 10-30 reps  Use cane to support right arm as get further back with hand-palm should face forward, hand should stay out at 90 degree angle  Squeeze shoulder blade back and down    Ways to progress:  -add 1/2# to 1# weight  -try without elbow supported on table      Shoulder lifts (lying on back)    Add abdominals-ribs down, contract abs, then lift arm, forearm rotated so thumb faces ceiling, keep elbow tucked in, 20-30 reps    Ways to progress:  -add 1/2# to 1# weight      Walking stick circles (standing or seated)    Hold walking stick with right hand and tip on floor  Make circles both  directions  push forward straight and pull back  Push to side straight and pull back in    Ways to progress:  Place stick up on chair, bed or sofa  Place on unstable ball  -add 2-3# wrap weight to stick    Have fun!    Raghav Matute OTR/MURRAY, MSCS  Occupational Therapy  [facility]  [address]  [place] [address]  Voice mail:[phone]  Fax: [phone]

## 2017-11-22 ENCOUNTER — HOSPITAL ENCOUNTER (OUTPATIENT)
Dept: PHYSICAL THERAPY | Facility: CLINIC | Age: 52
Setting detail: THERAPIES SERIES
End: 2017-11-22
Attending: PHYSICAL MEDICINE & REHABILITATION
Payer: COMMERCIAL

## 2017-11-22 ENCOUNTER — HOSPITAL ENCOUNTER (OUTPATIENT)
Dept: OCCUPATIONAL THERAPY | Facility: CLINIC | Age: 52
Setting detail: THERAPIES SERIES
End: 2017-11-22
Attending: PHYSICAL MEDICINE & REHABILITATION
Payer: COMMERCIAL

## 2017-11-22 PROCEDURE — 97750 PHYSICAL PERFORMANCE TEST: CPT | Mod: GP | Performed by: PHYSICAL THERAPIST

## 2017-11-22 PROCEDURE — 97032 APPL MODALITY 1+ESTIM EA 15: CPT | Mod: GO | Performed by: OCCUPATIONAL THERAPIST

## 2017-11-22 PROCEDURE — 40000719 ZZHC STATISTIC PT DEPARTMENT NEURO VISIT: Performed by: PHYSICAL THERAPIST

## 2017-11-22 PROCEDURE — 40000125 ZZHC STATISTIC OT OUTPT VISIT: Performed by: OCCUPATIONAL THERAPIST

## 2017-11-22 PROCEDURE — 97112 NEUROMUSCULAR REEDUCATION: CPT | Mod: GO | Performed by: OCCUPATIONAL THERAPIST

## 2017-11-22 PROCEDURE — 97116 GAIT TRAINING THERAPY: CPT | Mod: GP | Performed by: PHYSICAL THERAPIST

## 2017-11-22 PROCEDURE — 97110 THERAPEUTIC EXERCISES: CPT | Mod: GO | Performed by: OCCUPATIONAL THERAPIST

## 2017-11-22 PROCEDURE — 97110 THERAPEUTIC EXERCISES: CPT | Mod: GP | Performed by: PHYSICAL THERAPIST

## 2017-11-27 ENCOUNTER — HOSPITAL ENCOUNTER (OUTPATIENT)
Dept: OCCUPATIONAL THERAPY | Facility: CLINIC | Age: 52
Setting detail: THERAPIES SERIES
End: 2017-11-27
Attending: PHYSICAL MEDICINE & REHABILITATION
Payer: COMMERCIAL

## 2017-11-27 PROCEDURE — 97112 NEUROMUSCULAR REEDUCATION: CPT | Mod: GO | Performed by: OCCUPATIONAL THERAPIST

## 2017-11-27 PROCEDURE — 97032 APPL MODALITY 1+ESTIM EA 15: CPT | Mod: GO | Performed by: OCCUPATIONAL THERAPIST

## 2017-11-27 PROCEDURE — 97530 THERAPEUTIC ACTIVITIES: CPT | Mod: GO | Performed by: OCCUPATIONAL THERAPIST

## 2017-11-27 PROCEDURE — 40000125 ZZHC STATISTIC OT OUTPT VISIT: Performed by: OCCUPATIONAL THERAPIST

## 2017-11-29 ENCOUNTER — HOSPITAL ENCOUNTER (OUTPATIENT)
Dept: OCCUPATIONAL THERAPY | Facility: CLINIC | Age: 52
Setting detail: THERAPIES SERIES
End: 2017-11-29
Attending: PHYSICAL MEDICINE & REHABILITATION
Payer: COMMERCIAL

## 2017-11-29 PROCEDURE — 97032 APPL MODALITY 1+ESTIM EA 15: CPT | Mod: GO | Performed by: OCCUPATIONAL THERAPIST

## 2017-11-29 PROCEDURE — 97112 NEUROMUSCULAR REEDUCATION: CPT | Mod: GO | Performed by: OCCUPATIONAL THERAPIST

## 2017-11-29 PROCEDURE — 97110 THERAPEUTIC EXERCISES: CPT | Mod: GO | Performed by: OCCUPATIONAL THERAPIST

## 2017-11-29 PROCEDURE — 40000125 ZZHC STATISTIC OT OUTPT VISIT: Performed by: OCCUPATIONAL THERAPIST

## 2017-12-01 ENCOUNTER — HOSPITAL ENCOUNTER (OUTPATIENT)
Dept: OCCUPATIONAL THERAPY | Facility: CLINIC | Age: 52
Setting detail: THERAPIES SERIES
End: 2017-12-01
Attending: PHYSICAL MEDICINE & REHABILITATION
Payer: COMMERCIAL

## 2017-12-01 PROCEDURE — 97760 ORTHOTIC MGMT&TRAING 1ST ENC: CPT | Mod: GO | Performed by: OCCUPATIONAL THERAPIST

## 2017-12-01 PROCEDURE — 40000125 ZZHC STATISTIC OT OUTPT VISIT: Performed by: OCCUPATIONAL THERAPIST

## 2017-12-01 PROCEDURE — 97112 NEUROMUSCULAR REEDUCATION: CPT | Mod: GO | Performed by: OCCUPATIONAL THERAPIST

## 2017-12-01 NOTE — DISCHARGE INSTRUCTIONS
"Try hand splint to stretch fingers and thumb for a few nights in a row to see if makes a difference in your thumb tightness during the day when you use your hand    Work on forearm rotation and thumbs up when relaxed and forearm supported to help get \"automatic\" movement that is less effortful    Hand exerciser with resistance, monitor that you can still relax your hand and open it for a reps afterwards    Work with yellow putty on wrapping fingers and straightening them out to work in finger straightening strength.    Raghav to sew neoprene sleeve and will mail it out.        DENISE Balderrama/MURRAY, MSCS  Occupational Therapy  [facility] Out[facility] Rehabilitation Services  [address]  [place] [address]  Voice mail:[phone]  Fax: [phone]    "

## 2017-12-01 NOTE — IP AVS SNAPSHOT
"                  MRN:[id number]                      After Visit Summary   12/1/2017    Maldonado Mendiola    MRN: [id number]           Visit Information        Provider Department      12/1/2017 11:00 AM Raghav Matute OT [facility], [place], Occupational Therapy - Outpatient        Your next 10 appointments already scheduled     Oct 17, 2018 10:20 AM CDT   (Arrive by 10:05 AM)   Return Visit with Miguel Beauchamp MD   [facility] Physical Medicine and Rehabilitation ([facility])    [address] [place] [address] [phone]                Further instructions from your care team       Try hand splint to stretch fingers and thumb for a few nights in a row to see if makes a difference in your thumb tightness during the day when you use your hand    Work on forearm rotation and thumbs up when relaxed and forearm supported to help get \"automatic\" movement that is less effortful    Hand exerciser with resistance, monitor that you can still relax your hand and open it for a reps afterwards    Work with yellow putty on wrapping fingers and straightening them out to work in finger straightening strength.    Raghav to sew neoprene sleeve and will mail it out.        DENISE Balderrama/MURRAY, MSCS  Occupational Therapy  [facility]  [address], [address]  [place] [address]  Voice mail:[phone]  Fax: [phone]      MyChart Information     [employer] gives you secure access to your electronic health record. If you see a primary care provider, you can also send messages to your care team and make appointments. If you have questions, please call your primary care clinic.  If you do not have a primary care provider, please call [phone] and they will assist you.        Care EveryWhere ID     This is your Care EveryWhere ID. This could be used by other organizations to access your [facility]" records  [id number]        Equal Access to Services     CLAUDIO CARTY : Cristiane Jennings, lizbeth hernadez, patricia mtz, paul mccormack. So [facility] [phone].    ATENCIÓN: Si habla español, tiene a naik disposición servicios gratuitos de asistencia lingüística. Llame al [phone].    We comply with applicable federal civil rights laws and [place] laws. We do not discriminate on the basis of race, color, national origin, age, disability, sex, sexual orientation, or gender identity.

## 2017-12-20 DIAGNOSIS — G47.00 INSOMNIA, UNSPECIFIED TYPE: ICD-10-CM

## 2017-12-21 RX ORDER — TRAZODONE HYDROCHLORIDE 50 MG/1
TABLET, FILM COATED ORAL
Qty: 180 TABLET | Refills: 1 | Status: SHIPPED | OUTPATIENT
Start: 2017-12-21

## 2017-12-21 NOTE — TELEPHONE ENCOUNTER
Requested Prescriptions   Pending Prescriptions Disp Refills     traZODone (DESYREL) 50 MG tablet [Pharmacy Med Name: TRAZODONE 50MG TABLETS]  Last Written Prescription Date:  9/26/2017  Last Fill Quantity: 180 tablet,  # refills: 0   Last Office Visit with [facility], P or [facility] prescribing provider:  3/27/2017     Future Office Visit:      180 tablet 0     Sig: TAKE 1 TO 3 TABLETS BY MOUTH EVERY NIGHT 30 MINUTES BEFORE BEDTIME    Serotonin Modulators Passed    12/20/2017  9:41 PM       Passed - Recent or future visit with authorizing provider's specialty    Patient had office visit in the last year or has a visit in the next 30 days with authorizing provider.  See chart review.              Passed - Patient is age 18 or older

## 2018-02-08 ENCOUNTER — OFFICE VISIT (OUTPATIENT)
Dept: FAMILY MEDICINE | Facility: CLINIC | Age: 53
End: 2018-02-08
Payer: COMMERCIAL

## 2018-02-08 VITALS
WEIGHT: 171 LBS | BODY MASS INDEX: 23.16 KG/M2 | HEART RATE: 86 BPM | SYSTOLIC BLOOD PRESSURE: 131 MMHG | DIASTOLIC BLOOD PRESSURE: 71 MMHG | TEMPERATURE: 98.5 F | HEIGHT: 72 IN

## 2018-02-08 DIAGNOSIS — M54.50 ACUTE LEFT-SIDED LOW BACK PAIN WITHOUT SCIATICA: Primary | ICD-10-CM

## 2018-02-08 DIAGNOSIS — Z12.11 SPECIAL SCREENING FOR MALIGNANT NEOPLASMS, COLON: ICD-10-CM

## 2018-02-08 DIAGNOSIS — I61.9 HEMORRHAGIC STROKE (H): ICD-10-CM

## 2018-02-08 PROCEDURE — 99214 OFFICE O/P EST MOD 30 MIN: CPT | Performed by: FAMILY MEDICINE

## 2018-02-08 NOTE — PROGRESS NOTES
SUBJECTIVE:   Maldonado Mendiola is a 52 year old male who presents to clinic today for the following health issues:      Back Pain       Duration: 2-3 weeks        Specific cause: none    Description:   Location of pain: low back left  Character of pain: sharp  Pain radiation:none  New numbness or weakness in legs, not attributed to pain:  no     Intensity: no pain during the day, only with sneeze     History:   Pain interferes with job: Not applicable  History of back problems: no prior back problems  Any previous MRI or X-rays: None  Sees a specialist for back pain:  No  Therapies tried without relief: heat    Alleviating factors:   Improved by: movements       Precipitating factors:  Worsened by: sneezing, laying down at nights     Functional and Psychosocial Screen (Darling STarT Back):      -          Accompanying Signs & Symptoms:  Risk of Fracture:  None  Risk of Cauda Equina:  None  Risk of Infection:  None  Risk of Cancer:  None  Risk of Ankylosing Spondylitis:  Onset at age <35, male, AND morning back stiffness. no             Patient notes the pain and points to an area over the left sacroiliac joint.  He is worried that it might get a lot worse in the near future if he catches a cold or something like that due to the worsening with sneezing, coughing, and laying down at night.  There is no radiation of the discomfort down the leg.  Bowel and bladder function is stable at this time.    He also needs a form completed today for disability parking permit.  He has chronic right-sided weakness related to hemorrhagic CVA in the recent past.  He continues to follow with therapy at [facility].        Problem list and histories reviewed & adjusted, as indicated.  Additional history: as documented    Patient Active Problem List   Diagnosis     Esophageal reflux     CARDIOVASCULAR SCREENING; LDL GOAL LESS THAN 160     Panic attack     History of stroke     Hemorrhagic stroke (H)     Right hemiparesis (H)      Dysarthria     Cognitive deficits as late effect of cerebrovascular disease     Cognitive and behavioral changes     Slow transit constipation     Adjustment insomnia     Major depressive disorder, single episode, moderate with anxious distress (H)     Hemiparesis affecting right side as late effect of cerebrovascular accident (H)     Past Surgical History:   Procedure Laterality Date     DENTAL SURGERY      wisdom tooth extraction     VASECTOMY         Social History   Substance Use Topics     Smoking status: Never Smoker     Smokeless tobacco: Never Used     Alcohol use Yes      Comment: 1 beer a week      Family History   Problem Relation Age of Onset     Hypertension Mother      Hypertension Father      Ataxia Father      Alcohol/Drug Paternal Grandmother      CEREBROVASCULAR DISEASE No family hx of      Breast Cancer No family hx of      Cancer - colorectal No family hx of      Prostate Cancer No family hx of          Current Outpatient Prescriptions   Medication Sig Dispense Refill     traZODone (DESYREL) 50 MG tablet TAKE 1 TO 3 TABLETS BY MOUTH EVERY NIGHT 30 MINUTES BEFORE BEDTIME 180 tablet 1     Krill Oil 1000 MG CAPS Take 2 capsules by mouth daily       lisinopril (PRINIVIL/ZESTRIL) 10 MG tablet 1 tab daily 90 tablet 1     No Known Allergies    Reviewed and updated as needed this visit by clinical staff  Tobacco  Allergies  Meds  Problems  Med Hx  Surg Hx  Fam Hx  Soc Hx        Reviewed and updated as needed this visit by Provider  Tobacco  Meds  Problems  Med Hx  Surg Hx  Fam Hx  Soc Hx        ROS:  Constitutional, HEENT, cardiovascular, pulmonary, gi and gu systems are negative, except as otherwise noted.    OBJECTIVE:     /71  Pulse 86  Temp 98.5  F (36.9  C) (Oral)  Ht 6' (1.829 m)  Wt 171 lb (77.6 kg)  BMI 23.19 kg/m2  Body mass index is 23.19 kg/(m^2).  GENERAL: healthy, alert and no distress  EYES: Eyes grossly normal to inspection, PERRL and conjunctivae and sclerae  normal  MS: no gross musculoskeletal defects noted, no edema  SKIN: no suspicious lesions or rashes  NEURO: sensory exam grossly normal, mentation intact and patient has an abnormal gait related to right-sided weakness.  Deep tendon reflexes in the lower extremities were consistent with previous CVA showing hyperreflexia on the right side.  Downgoing toe on the right side.  6-8 beats of clonus on the right side.  Downgoing toe on the left with 1-2 beats of clonus.  BACK: no CVA tenderness, no paralumbar tenderness  Comprehensive back pain exam:  Tenderness of Left SI joint, Range of motion not limited by pain, Lower extremity strength functional and equal on both sides and Lower extremity strength showed weakness on the right side.  This was felt to be consistent with previous CVA., Lower extremity reflexes within normal limits bilaterally and Deep tendon reflexes as summarized under neuro exam and Straight leg raise negative bilaterally  PSYCH: mentation appears normal, affect normal/bright    Diagnostic Test Results:  none     ASSESSMENT/PLAN:             1. Acute left-sided low back pain without sciatica  Pain appears to be coming from the left sacroiliac joint.  Symptomatic cares and exercises were discussed.  Recheck as needed.  He did ask specifically about use of NSAIDs with his lisinopril and the mildly antagonistic relationship between these 2 medications was discussed.  I think the risk is low with short-term use but he certainly could monitor his blood pressure.    2. Hemorrhagic stroke (H)  Disability parking permit was completed for 2 years.  He has been driving without any adaptive equipment and been doing well over the last year.  Cognitively he seems appropriate to continue to drive.    3. Special screening for malignant neoplasms, colon  He is due for colon cancer screening.  Fit testing has been done in the past so we elected to give 1 of those today.  - Fecal colorectal cancer screen (FIT);  Future    FUTURE APPOINTMENTS:       - Follow-up for annual visit or as needed    Kalyan Howard MD  [facility]

## 2018-02-08 NOTE — MR AVS SNAPSHOT
After Visit Summary   2/8/2018    Maldonado Mendiola    MRN: [id number]           Patient Information     Date Of Birth          1965        Visit Information        Provider Department      2/8/2018 4:00 PM Kalyan Howard MD [facility]        Today's Diagnoses     Acute left-sided low back pain without sciatica    -  1    Hemorrhagic stroke (H)        Special screening for malignant neoplasms, colon           Follow-ups after your visit        Your next 10 appointments already scheduled     Oct 17, 2018 10:20 AM CDT   (Arrive by 10:05 AM)   Return Visit with Miguel Beauchamp MD   [facility] Physical Medicine and Rehabilitation ([facility])    [address] [place] [address]  3rd [facility] [address] [phone]              Future tests that were ordered for you today     Open Future Orders        Priority Expected Expires Ordered    Fecal colorectal cancer screen (FIT) Routine 3/1/2018 5/3/2018 2/8/2018            Who to contact     If you have questions or need follow up information about today's clinic visit or your schedule please contact [facility] directly at [phone].  Normal or non-critical lab and imaging results will be communicated to you by [employer]hart, letter or phone within 4 business days after the clinic has received the results. If you do not hear from us within 7 days, please contact the clinic through [employer]hart or phone. If you have a critical or abnormal lab result, we will notify you by phone as soon as possible.  Submit refill requests through [employer] or call your pharmacy and they will forward the refill request to us. Please allow 3 business days for your refill to be completed.          Additional Information About Your Visit        MyChart Information     [employer] gives you secure access to your electronic health record. If you see a primary care provider, you can also send messages to your care team  and make appointments. If you have questions, please call your primary care clinic.  If you do not have a primary care provider, please call [phone] and they will assist you.        Care EveryWhere ID     This is your Care EveryWhere ID. This could be used by other organizations to access your [facility] medical records  [id number]        Your Vitals Were     Pulse Temperature Height BMI (Body Mass Index)          86 98.5  F (36.9  C) (Oral) 6' (1.829 m) 23.19 kg/m2         Blood Pressure from Last 3 Encounters:   02/08/18 131/71   10/18/17 129/67   07/25/17 127/72    Weight from Last 3 Encounters:   02/08/18 171 lb (77.6 kg)   10/18/17 160 lb 6.4 oz (72.8 kg)   07/21/17 164 lb 10.9 oz (74.7 kg)               Primary Care Provider Office Phone # Fax #    Edmond Cano PA-C [phone]       [facility] [facility] [address]        Equal Access to Services     [facility] HospitalVIKASH : Hadii aad ku hadasho Soomaali, waaxda luqadaha, qaybta kaalmada adeegyada, waxcharles henriquez . So [facility] [place] [facility] [phone].    ATENCIÓN: Si habla español, tiene a naik disposición servicios gratuitos de asistencia lingüística. [facility] [phone].    We comply with applicable federal civil rights laws and [place] laws. We do not discriminate on the basis of race, color, national origin, age, disability, sex, sexual orientation, or gender identity.            Thank you!     Thank you for choosing [facility]  for your care. Our goal is always to provide you with excellent care. Hearing back from our patients is one way we can continue to improve our services. Please take a few minutes to complete the written survey that you may receive in the mail after your visit with us. Thank you!             Your Updated Medication List - Protect others around you: Learn how to safely use, store and throw away your medicines at www.disposemymeds.org.          This list is accurate as of  2/8/18 11:59 PM.  Always use your most recent med list.                   Brand Name Dispense Instructions for use Diagnosis    Krill Oil 1000 MG Caps      Take 2 capsules by mouth daily        lisinopril 10 MG tablet    PRINIVIL/ZESTRIL    90 tablet    1 tab daily    Hemorrhagic stroke (H), Right hemiparesis (H), Dysarthria, Hypertension goal BP (blood pressure) < 140/90       traZODone 50 MG tablet    DESYREL    180 tablet    TAKE 1 TO 3 TABLETS BY MOUTH EVERY NIGHT 30 MINUTES BEFORE BEDTIME    Insomnia, unspecified type

## 2018-02-19 ENCOUNTER — TELEPHONE (OUTPATIENT)
Dept: FAMILY MEDICINE | Facility: CLINIC | Age: 53
End: 2018-02-19

## 2018-02-19 NOTE — TELEPHONE ENCOUNTER
Reason for Call:  Other     Detailed comments: patient would like a call back about his appointment on 2/8/18 he would like to know the name joint in his back that is giving him back pain and stated that a message could be left on voice mail with information     Phone Number Patient can be reached at: Home number on file [phone] (home)    Best Time: any    Can we leave a detailed message on this number? YES    Call taken on 2/19/2018 at 8:51 AM by Leana Marie

## 2018-02-19 NOTE — TELEPHONE ENCOUNTER
Reviewed 2/8 appt & spoke with patient- his left sacroiliac joint is affected. Patient verbalized understanding.     Yancy Terrazas RN

## 2018-02-22 ENCOUNTER — TELEPHONE (OUTPATIENT)
Dept: FAMILY MEDICINE | Facility: CLINIC | Age: 53
End: 2018-02-22

## 2018-02-22 DIAGNOSIS — I61.9 HEMORRHAGIC STROKE (H): ICD-10-CM

## 2018-02-22 DIAGNOSIS — I10 HYPERTENSION GOAL BP (BLOOD PRESSURE) < 140/90: ICD-10-CM

## 2018-02-22 DIAGNOSIS — G81.91 RIGHT HEMIPARESIS (H): ICD-10-CM

## 2018-02-22 DIAGNOSIS — R47.1 DYSARTHRIA: ICD-10-CM

## 2018-02-22 RX ORDER — LISINOPRIL 10 MG/1
TABLET ORAL
Qty: 30 TABLET | Refills: 0 | Status: SHIPPED | OUTPATIENT
Start: 2018-02-22 | End: 2018-03-21

## 2018-02-23 NOTE — TELEPHONE ENCOUNTER
Due for labs, ordered labs but  also has two additional labs listed- left VM to see if patient is interested in checking those as well, alejandrina x1 with note.  Yancy Terrazas RN

## 2018-02-23 NOTE — TELEPHONE ENCOUNTER
"Requested Prescriptions   Pending Prescriptions Disp Refills     lisinopril (PRINIVIL/ZESTRIL) 10 MG tablet [Pharmacy Med Name: LISINOPRIL 10MG TABLETS]  Last Written Prescription Date:  7/11/2017  Last Fill Quantity: 90 tabs,  # refills: 1   Last Office Visit with [facility], [facility] or [facility] prescribing provider:  2/8/2018  Future Office Visit:      90 tablet 0     Sig: TAKE 1 TABLET(10 MG) BY MOUTH DAILY    ACE Inhibitors (Including Combos) Protocol Failed    2/22/2018  1:18 PM       Failed - Normal serum creatinine on file in past 12 months    Recent Labs   Lab Test  01/07/17   0633   CR  0.68            Failed - Normal serum potassium on file in past 12 months    Recent Labs   Lab Test  01/07/17   0633   POTASSIUM  4.0            Passed - Blood pressure under 140/90 in past 12 months    BP Readings from Last 3 Encounters:   02/08/18 131/71   10/18/17 129/67   07/25/17 127/72                Passed - Recent or future visit with authorizing provider's specialty    Patient had office visit in the last year or has a visit in the next 30 days with authorizing provider.  See \"Patient Info\" tab in inbasket, or \"Choose Columns\" in Meds & Orders section of the refill encounter.            Passed - Patient is age 18 or older          "

## 2018-03-14 DIAGNOSIS — I10 HYPERTENSION GOAL BP (BLOOD PRESSURE) < 140/90: ICD-10-CM

## 2018-03-14 LAB
ANION GAP SERPL CALCULATED.3IONS-SCNC: 7 MMOL/L (ref 3–14)
BUN SERPL-MCNC: 22 MG/DL (ref 7–30)
CALCIUM SERPL-MCNC: 8.7 MG/DL (ref 8.5–10.1)
CHLORIDE SERPL-SCNC: 104 MMOL/L (ref 94–109)
CO2 SERPL-SCNC: 27 MMOL/L (ref 20–32)
CREAT SERPL-MCNC: 0.74 MG/DL (ref 0.66–1.25)
GFR SERPL CREATININE-BSD FRML MDRD: >90 ML/MIN/1.7M2
GLUCOSE SERPL-MCNC: 95 MG/DL (ref 70–99)
POTASSIUM SERPL-SCNC: 3.8 MMOL/L (ref 3.4–5.3)
SODIUM SERPL-SCNC: 138 MMOL/L (ref 133–144)

## 2018-03-14 PROCEDURE — 80048 BASIC METABOLIC PNL TOTAL CA: CPT | Performed by: PHYSICIAN ASSISTANT

## 2018-03-14 PROCEDURE — 36415 COLL VENOUS BLD VENIPUNCTURE: CPT | Performed by: PHYSICIAN ASSISTANT

## 2018-03-21 DIAGNOSIS — G81.91 RIGHT HEMIPARESIS (H): ICD-10-CM

## 2018-03-21 DIAGNOSIS — I61.9 HEMORRHAGIC STROKE (H): ICD-10-CM

## 2018-03-21 DIAGNOSIS — I10 HYPERTENSION GOAL BP (BLOOD PRESSURE) < 140/90: ICD-10-CM

## 2018-03-21 DIAGNOSIS — R47.1 DYSARTHRIA: ICD-10-CM

## 2018-03-21 NOTE — TELEPHONE ENCOUNTER
"Requested Prescriptions   Pending Prescriptions Disp Refills     lisinopril (PRINIVIL/ZESTRIL) 10 MG tablet [Pharmacy Med Name: LISINOPRIL 10MG TABLETS]  Last Written Prescription Date:  2/22/2018  Last Fill Quantity: 30 tablet,  # refills: 0   Last office visit: 2/8/2018 with prescribing provider:  ALPHONSO Howard   Future Office Visit:     30 tablet 0     Sig: TAKE 1 TABLET(10 MG) BY MOUTH DAILY    ACE Inhibitors (Including Combos) Protocol Passed    3/21/2018  5:25 PM       Passed - Blood pressure under 140/90 in past 12 months    BP Readings from Last 3 Encounters:   02/08/18 131/71   10/18/17 129/67   07/25/17 127/72          Passed - Recent (12 mo) or future (30 days) visit within the authorizing provider's specialty    Patient had office visit in the last 12 months or has a visit in the next 30 days with authorizing provider or within the authorizing provider's specialty.  See \"Patient Info\" tab in inbasket, or \"Choose Columns\" in Meds & Orders section of the refill encounter.           Passed - Patient is age 18 or older       Passed - Normal serum creatinine on file in past 12 months    Recent Labs   Lab Test  03/14/18   0928   CR  0.74          Passed - Normal serum potassium on file in past 12 months    Recent Labs   Lab Test  03/14/18   0928   POTASSIUM  3.8               "

## 2018-03-22 RX ORDER — LISINOPRIL 10 MG/1
TABLET ORAL
Qty: 90 TABLET | Refills: 3 | Status: SHIPPED | OUTPATIENT
Start: 2018-03-22 | End: 2019-04-03

## 2018-03-22 NOTE — TELEPHONE ENCOUNTER
Prescription approved per [facility] [place].    Yancy Terrazas RN

## 2018-04-15 PROCEDURE — 82274 ASSAY TEST FOR BLOOD FECAL: CPT | Performed by: FAMILY MEDICINE

## 2018-04-18 DIAGNOSIS — Z12.11 SPECIAL SCREENING FOR MALIGNANT NEOPLASMS, COLON: ICD-10-CM

## 2018-04-18 LAB — HEMOCCULT STL QL IA: NEGATIVE

## 2018-05-10 ENCOUNTER — OFFICE VISIT (OUTPATIENT)
Dept: FAMILY MEDICINE | Facility: CLINIC | Age: 53
End: 2018-05-10
Payer: COMMERCIAL

## 2018-05-10 VITALS
HEART RATE: 72 BPM | TEMPERATURE: 98.2 F | DIASTOLIC BLOOD PRESSURE: 72 MMHG | WEIGHT: 166 LBS | HEIGHT: 72 IN | BODY MASS INDEX: 22.48 KG/M2 | SYSTOLIC BLOOD PRESSURE: 114 MMHG

## 2018-05-10 DIAGNOSIS — I69.919 COGNITIVE DEFICITS AS LATE EFFECT OF CEREBROVASCULAR DISEASE: ICD-10-CM

## 2018-05-10 DIAGNOSIS — R63.0 DECREASED APPETITE: ICD-10-CM

## 2018-05-10 DIAGNOSIS — I61.9 HEMORRHAGIC STROKE (H): ICD-10-CM

## 2018-05-10 DIAGNOSIS — I69.351 HEMIPARESIS AFFECTING RIGHT SIDE AS LATE EFFECT OF CEREBROVASCULAR ACCIDENT (H): ICD-10-CM

## 2018-05-10 DIAGNOSIS — Z12.11 SCREEN FOR COLON CANCER: ICD-10-CM

## 2018-05-10 DIAGNOSIS — F32.1 MAJOR DEPRESSIVE DISORDER, SINGLE EPISODE, MODERATE WITH ANXIOUS DISTRESS (H): ICD-10-CM

## 2018-05-10 DIAGNOSIS — Z11.59 NEED FOR HEPATITIS C SCREENING TEST: ICD-10-CM

## 2018-05-10 DIAGNOSIS — G81.91 RIGHT HEMIPARESIS (H): ICD-10-CM

## 2018-05-10 DIAGNOSIS — Z00.00 ROUTINE GENERAL MEDICAL EXAMINATION AT A HEALTH CARE FACILITY: Primary | ICD-10-CM

## 2018-05-10 PROCEDURE — 99396 PREV VISIT EST AGE 40-64: CPT | Performed by: PHYSICIAN ASSISTANT

## 2018-05-10 NOTE — PROGRESS NOTES
SUBJECTIVE:   CC: Maldonado Mendiola is an 53 year old male who presents for preventative health visit.     Physical   Annual:     Getting at least 3 servings of Calcium per day::  NO    Bi-annual eye exam::  NO    Dental care twice a year::  Yes    Sleep apnea or symptoms of sleep apnea::  None    Diet::  Regular (no restrictions)    Taking medications regularly::  Yes    Medication side effects::  Lightheadedness    Additional concerns today::  No            1.5 years ago suffered a left brain hemorrhagic stroke. He's made good strides. Has right sided weakness but improved. Gait unstable. Goes to gym daily. Working on fine motor skills    Is supposed to follow up with his disability insurance in terms of his progress since his stroke  Today's PHQ-2 Score:   PHQ-2 ( 1999 [employer]) 5/10/2018   Q1: Little interest or pleasure in doing things 0   Q2: Feeling down, depressed or hopeless 0   PHQ-2 Score 0   Q1: Little interest or pleasure in doing things Not at all   Q2: Feeling down, depressed or hopeless Not at all   PHQ-2 Score 0       Abuse: Current or Past(Physical, Sexual or Emotional)- No  Do you feel safe in your environment - Yes    Social History   Substance Use Topics     Smoking status: Never Smoker     Smokeless tobacco: Never Used     Alcohol use Yes      Comment: 1 beer a week      Alcohol Use 5/10/2018   If you drink alcohol do you typically have greater than 3 drinks per day OR greater than 7 drinks per week? No   No flowsheet data found.    Last PSA: No results found for: PSA    Reviewed orders with patient. Reviewed health maintenance and updated orders accordingly - Yes  Labs reviewed in EPIC    Reviewed and updated as needed this visit by clinical staff  Tobacco  Allergies  Med Hx  Surg Hx  Fam Hx  Soc Hx        Reviewed and updated as needed this visit by Provider            Review of Systems  C: NEGATIVE for fever, chills, change in weight  I: NEGATIVE for worrisome rashes, moles or lesions  E:  NEGATIVE for vision changes or irritation  ENT: NEGATIVE for ear, mouth and throat problems  R: NEGATIVE for significant cough or SOB  CV: NEGATIVE for chest pain, palpitations or peripheral edema  GI: NEGATIVE for nausea, abdominal pain, heartburn, or change in bowel habits   male: negative for dysuria, hematuria, decreased urinary stream, erectile dysfunction, urethral discharge  M: NEGATIVE for significant arthralgias or myalgia  N: NEGATIVE for weakness, dizziness or paresthesias  P: NEGATIVE for changes in mood or affect    OBJECTIVE:   /72 (Cuff Size: Adult Regular)  Pulse 72  Temp 98.2  F (36.8  C) (Oral)  Ht 6' (1.829 m)  Wt 166 lb (75.3 kg)  BMI 22.51 kg/m2    Physical Exam  GENERAL: healthy, alert and no distress  EYES: Eyes grossly normal to inspection, PERRL and conjunctivae and sclerae normal  HENT: ear canals and TM's normal, nose and mouth without ulcers or lesions  NECK: no adenopathy, no asymmetry, masses, or scars and thyroid normal to palpation  RESP: lungs clear to auscultation - no rales, rhonchi or wheezes  CV: regular rate and rhythm, normal S1 S2, no S3 or S4, no murmur, click or rub, no peripheral edema and peripheral pulses strong  ABDOMEN: soft, nontender, no hepatosplenomegaly, no masses and bowel sounds normal  MS: Right upper extremity 3/5 strength in all directions hand /  is 2/5. Left leg 3/5. Gait unstable.   SKIN: no suspicious lesions or rashes  NEURO: Other than right side hemiplegia, Normal strength and tone, sensory exam grossly normal and mentation intact  PSYCH: mentation appears normal, affect normal/bright  LYMPH: no cervical, supraclavicular, axillary, or inguinal adenopathy    ASSESSMENT/PLAN:   (Z00.00) Routine general medical examination at a health care facility  (primary encounter diagnosis)  Comment: Well person   Plan: CANCELED: Lipid panel reflex to direct LDL         Fasting, CANCELED: Basic metabolic panel  (Ca,         Cl, CO2, Creat, Gluc, K,  Na, BUN)        Diet, exercise, wellness and other preventive recommendations related to health maintenance were discussed.  Follow up as needed for acute issues.  Physical exam in 1 year.     (I61.9) Hemorrhagic stroke (H)  Comment:   Plan: Stable, improving     (G81.91) Right hemiparesis (H)  Comment:   Plan: Stable, improving     (I69.919) Cognitive deficits as late effect of cerebrovascular disease  Comment:   Plan: Improving     (F32.1) Major depressive disorder, single episode, moderate with anxious distress (H)  Comment:   Plan: Mood improved     (I69.351) Hemiparesis affecting right side as late effect of cerebrovascular accident (H)  Comment:   Plan: Stable     (Z11.59) Need for hepatitis C screening test  Comment:   Plan: CANCELED: Hepatitis C Screen Reflex to HCV RNA         Quant and Genotype        Declined labs     (Z12.11) Screen for colon cancer  Comment:   Plan: FIT up to date     (R63.0) Decreased appetite  Comment: Reports some decreases   Plan: Likely TBI related. Will monitor.    COUNSELING:   Reviewed preventive health counseling, as reflected in patient instructions         reports that he has never smoked. He has never used smokeless tobacco.    Estimated body mass index is 22.51 kg/(m^2) as calculated from the following:    Height as of this encounter: 6' (1.829 m).    Weight as of this encounter: 166 lb (75.3 kg).       Counseling Resources:  ATP IV Guidelines  Pooled Cohorts Equation Calculator  FRAX Risk Assessment  ICSI Preventive Guidelines  Dietary Guidelines for Americans, 2010  USDA's MyPlate  ASA Prophylaxis  Lung CA Screening    CARO LOPEZ PA-C  [facility]  Answers for HPI/ROS submitted by the patient on 5/10/2018   PHQ-2 Score: 0

## 2018-05-10 NOTE — MR AVS SNAPSHOT
After Visit Summary   5/10/2018    Maldonado Mendiola    MRN: [id number]           Patient Information     Date Of Birth          1965        Visit Information        Provider Department      5/10/2018 9:20 AM Edmond Cano PA-C [facility]        Today's Diagnoses     Routine general medical examination at a health care facility    -  1    Hemorrhagic stroke (H)        Right hemiparesis (H)        Cognitive deficits as late effect of cerebrovascular disease        Major depressive disorder, single episode, moderate with anxious distress (H)        Hemiparesis affecting right side as late effect of cerebrovascular accident (H)        Need for hepatitis C screening test        Screen for colon cancer        Decreased appetite          Care Instructions      Preventive Health Recommendations  Male Ages 50 - 64    Yearly exam:             See your health care provider every year in order to  o   Review health changes.   o   Discuss preventive care.    o   Review your medicines if your doctor has prescribed any.     Have a cholesterol test every 5 years, or more frequently if you are at risk for high cholesterol/heart disease.     Have a diabetes test (fasting glucose) every three years. If you are at risk for diabetes, you should have this test more often.     Have a colonoscopy at age 50, or have a yearly FIT test (stool test). These exams will check for colon cancer.      Talk with your health care provider about whether or not a prostate cancer screening test (PSA) is right for you.    You should be tested each year for STDs (sexually transmitted diseases), if you re at risk.     Shots: Get a flu shot each year. Get a tetanus shot every 10 years.     Nutrition:    Eat at least 5 servings of fruits and vegetables daily.     Eat whole-grain bread, whole-wheat pasta and brown rice instead of white grains and rice.     Talk to your provider about Calcium and Vitamin D.      Lifestyle    Exercise for at least 150 minutes a week (30 minutes a day, 5 days a week). This will help you control your weight and prevent disease.     Limit alcohol to one drink per day.     No smoking.     Wear sunscreen to prevent skin cancer.     See your dentist every six months for an exam and cleaning.     See your eye doctor every 1 to 2 years.            Follow-ups after your visit        Your next 10 appointments already scheduled     Oct 17, 2018 10:20 AM CDT   (Arrive by 10:05 AM)   Return Visit with Miguel Beauchamp MD   [facility] ([facility])    [address]  [address] [facility] [address] [phone]              Who to contact     If you have questions or need follow up information about today's clinic visit or your schedule please contact [facility] directly at [phone].  Normal or non-critical lab and imaging results will be communicated to you by MyChart, letter or phone within 4 business days after the clinic has received the results. If you do not hear from us within 7 days, please contact the clinic through [employer] or phone. If you have a critical or abnormal lab result, we will notify you by phone as soon as possible.  Submit refill requests through [employer] or call your pharmacy and they will forward the refill request to us. Please allow 3 business days for your refill to be completed.          Additional Information About Your Visit        MyChart Information     [employer] gives you secure access to your electronic health record. If you see a primary care provider, you can also send messages to your care team and make appointments. If you have questions, please call your primary care clinic.  If you do not have a primary care provider, please call [phone] and they will assist you.        Care EveryWhere ID     This is your Care EveryWhere ID. This could be used by other organizations to  access your [place] medical records  [id number]        Your Vitals Were     Pulse Temperature Height BMI (Body Mass Index)          72 98.2  F (36.8  C) (Oral) 6' (1.829 m) 22.51 kg/m2         Blood Pressure from Last 3 Encounters:   05/10/18 114/72   02/08/18 131/71   10/18/17 129/67    Weight from Last 3 Encounters:   05/10/18 166 lb (75.3 kg)   02/08/18 171 lb (77.6 kg)   10/18/17 160 lb 6.4 oz (72.8 kg)              Today, you had the following     No orders found for display         Today's Medication Changes          These changes are accurate as of 5/10/18 10:10 AM.  If you have any questions, ask your nurse or doctor.               These medicines have changed or have updated prescriptions.        Dose/Directions    traZODone 50 MG tablet   Commonly known as:  DESYREL   This may have changed:  See the new instructions.   Used for:  Insomnia, unspecified type        TAKE 1 TO 3 TABLETS BY MOUTH EVERY NIGHT 30 MINUTES BEFORE BEDTIME   Quantity:  180 tablet   Refills:  1                Primary Care Provider Office Phone # Fax #    Edmond Cano PA-C [phone]       [address] [place] [id number]        Equal Access to Services     CLAUDIO CARTY AH: Hadii paz ku hadasho Soomaali, waaxda luqadaha, qaybta kaalmada adeegyada, waxay idiin hayjenniffer mccormack. So [facility] [phone].    ATENCIÓN: Si habla español, tiene a naik disposición servicios gratuitos de asistencia lingüística. Llame al [phone].    We comply with applicable federal civil rights laws and [place] laws. We do not discriminate on the basis of race, color, national origin, age, disability, sex, sexual orientation, or gender identity.            Thank you!     Thank you for choosing [facility]  for your care. Our goal is always to provide you with excellent care. Hearing back from our patients is one way we can continue to improve our services. Please take a few minutes to complete  the written survey that you may receive in the mail after your visit with us. Thank you!             Your Updated Medication List - Protect others around you: Learn how to safely use, store and throw away your medicines at www.disposemymeds.org.          This list is accurate as of 5/10/18 10:10 AM.  Always use your most recent med list.                   Brand Name Dispense Instructions for use Diagnosis    lisinopril 10 MG tablet    PRINIVIL/ZESTRIL    90 tablet    TAKE 1 TABLET(10 MG) BY MOUTH DAILY    Hemorrhagic stroke (H), Right hemiparesis (H), Dysarthria, Hypertension goal BP (blood pressure) < 140/90       traZODone 50 MG tablet    DESYREL    180 tablet    TAKE 1 TO 3 TABLETS BY MOUTH EVERY NIGHT 30 MINUTES BEFORE BEDTIME    Insomnia, unspecified type

## 2018-05-14 ENCOUNTER — TELEPHONE (OUTPATIENT)
Dept: FAMILY MEDICINE | Facility: CLINIC | Age: 53
End: 2018-05-14

## 2018-05-14 NOTE — TELEPHONE ENCOUNTER
Forms received from: [employer]    Phone number listed: [phone]   Fax listed: [phone]  Date received: 5-14-18  Form description: Attending Physician Statement   Once forms are completed, please return to [facility]  via fax.  Is patient requesting to be contacted when forms are completed: n/a  Form placed: Provider folder    Laurie Vaca

## 2018-06-01 NOTE — TELEPHONE ENCOUNTER
Reason for Call:  Other call back    Detailed comments: patient states that the information that was [facility] was not enough, [facility] is requesting all of the visit notes not just the AVS for the appointment. Please fax complete chart notes to [phone] with Claim number written on form: [id number]. Please then call the patient to notify this has been completed.    Phone Number Patient can be reached at: Home number on file [phone] (home)    Best Time: any    Can we leave a detailed message on this number? YES    Call taken on 6/1/2018 at 10:50 AM by Payal Angel

## 2018-06-08 ENCOUNTER — TELEPHONE (OUTPATIENT)
Dept: FAMILY MEDICINE | Facility: CLINIC | Age: 53
End: 2018-06-08

## 2018-06-08 DIAGNOSIS — F41.0 PANIC ATTACK: ICD-10-CM

## 2018-06-08 RX ORDER — ESCITALOPRAM OXALATE 10 MG/1
10 TABLET ORAL DAILY
Qty: 90 TABLET | Refills: 1 | Status: SHIPPED | OUTPATIENT
Start: 2018-06-08

## 2018-06-08 NOTE — TELEPHONE ENCOUNTER
No, this is fine - we talked about it at his recent visit. I sent script to his pharmacy.  Thanks.  Joshua

## 2018-06-08 NOTE — TELEPHONE ENCOUNTER
Lexapro was last prescribed on 9/18/15 and was stopped at discharge. He was seen on 5/10 for Major depressive disorder, single episode, moderate with anxious distress. Would you prefer a phone visit? (patient was wondering if he could avoid an office visit).  Yancy Terrazas RN

## 2018-06-08 NOTE — TELEPHONE ENCOUNTER
Reason for Call:  Medication or medication refill:    Do you use a [facility]?  Name of the pharmacy and phone number for the current request:  [place] [facility], [facility] [phone]    Name of the medication requested: escitalopram (LEXAPRO) 10 MG tablet     Other request: patient would like to know if he can get this medication without having an office visit, since he just saw PCP for a physical on 05/10/18    Can we leave a detailed message on this number? YES    Phone number patient can be reached at: Home number on file [phone] (home)    Best Time: anytime    Call taken on 6/8/2018 at 1:06 PM by Shira Murguia

## 2018-08-01 NOTE — ADDENDUM NOTE
Encounter addended by: Raghav Matute, OT on: 8/1/2018 12:00 PM<BR>     Actions taken: Flowsheet accepted

## 2018-08-01 NOTE — PROGRESS NOTES
12/01/17 1100   Signing Clinician's Name / Credentials   Signing clinician's name / credentials DENISE Balderrama/MURRAY, [facility]   Session Number   Session Number 13 of up to 34 visits; [employer] ; 1/2/2018 Occupational Therapy Discharge Summary Addendum   Progress/Recertification   Progress Note Due 01/02/18   OT Goal 1   Goal Description Pt will demonstrate consistent finger manipulation and active forearm pronation and alternating supination to allow him to  food on utensil and to pour milk from gallon milk jug per report on 90% of trials with Ind HEP for NMES to pronators   Target Date 01/02/18   OT Goal 2   Goal Description Pt will demonstrate ability reach and place up to 3# items at shoulder height consistently with R UE or bilaterally without trunk compensation and good trunk stability  75% of trials   Target Date 01/02/18   OT Goal 3   Goal Description Pt will be able to isolate finger and thumb movement on R to allow for use of 3 point pinch to hold pen, pick for guitar and other items with adaptations as needed to allow patient to complete at least 2 ADL tasks using 3 point pinch.   Target Date 01/02/18   Objective Measure 3   Objective Measure Box and Blocks Test   Details 9 blocks/min-used 2 point pinch for all   Neuromuscular Re-education   Minutes 60 Minutes   Skilled Intervention Reassessed R UE function-see above; upgraded and educated in HEP for R UE with neuromuscular re-education principles of closed chain tasks, how to help pt grade tasks from concentric to eccentric and with use of NMES at home for stimulation; and in principles of spaticity and monitoring this and methods to stretch.   Patient Response Good understanding.   Treatment Detail See progress below.    Progress goals progressing   Orthotics   Minutes 15 Minutes   Type of Orthotic R pre-fabricated resting hand splint with fingers/thumb in abducted position   Wear Schedule nightly and with rests   Skilled Intervention Assessed for and  fit with R medium splint that was modified with strapping to aid appropriate positioning of fingers, thumb and forearm, trained in wear schedule, precautions.   Patient Response Good dawna, liked fit   Treatment Detail Will trial new splint isued at night at least 3-4 nights in a row to determine if it heps reduced thumb and finger/wrist spasticity to aid hand function during the day following.   Progress goals progressing   Education   Learner Patient   Readiness Acceptance;Eager   Method Explanation;Demonstration   Response Verbalizes understanding;Demonstrates understanding   Education Notes Pt making needs known, wants to work on HEP to focus on his R shoulder, trunk and hand before returning again.   Communication with other professionals   Communication with other professionals Relayed POC to Laura Johns OTR/L also working with patient.   Plan   Homework See AVS:try new splint for R hand; putty for finger ext strengthening; active pronation/supination hand open and thumb ext and  exerciser; along with previous HEP    Updates to plan of care Progress made: able seated with elbow on arm rest to pronate and supination R forearm fully alternating 8 times with hand relaxed; able to fully extend R thumb in same position at least 3 times; lifts R UE overhead-full shoulder flexion with slight elbow flex (30 degrees) 4-5 reps consecutive; straightens R fingers and makes fist, alternating at least 12 reps consecutive-with therapist assisting thumb ext; able to resistively grasp 5 reps and then still open hand after;    Plan for next session Plan to see in 1 month's time to allow pt to work on HEP per his preference and then determine if would benefit from FES cycling trial 2x/week again which is promoting new stroke neuromuscular re-education when combined with his HEP that was upgraded.   Comments   Comments 1/2/2018 Occupational Therapy Discharge Summary Addendum: Pt decided after 1 month not to return for f/u as  per phone f/u he felt he was doing well with his HEP and with pool therapy to address his R UE function. See also note from 1/17/17 for recent R UE reassessment measures. He made significant gains in R UE functional strength and ROM using the FES UE cycle, combined wtih upgrading his HEP program for neuromuscular recovery.  PT was also fitted with a new resting hand splint to provide additional stretch to R hand at night to aid hand function. Plan to DC OT at this time. Goals were all progressing but not yet met as not all sessions were achieved.-Raghav Matute, OTR/L, MSCS   Total Session Time   Timed Code Treatment Minutes 75

## 2018-10-15 ENCOUNTER — MYC MEDICAL ADVICE (OUTPATIENT)
Dept: PHYSICAL MEDICINE AND REHAB | Facility: CLINIC | Age: 53
End: 2018-10-15

## 2019-04-03 ENCOUNTER — TELEPHONE (OUTPATIENT)
Dept: FAMILY MEDICINE | Facility: CLINIC | Age: 54
End: 2019-04-03

## 2019-04-03 DIAGNOSIS — R47.1 DYSARTHRIA: ICD-10-CM

## 2019-04-03 DIAGNOSIS — I10 HYPERTENSION GOAL BP (BLOOD PRESSURE) < 140/90: ICD-10-CM

## 2019-04-03 DIAGNOSIS — I61.9 HEMORRHAGIC STROKE (H): ICD-10-CM

## 2019-04-03 DIAGNOSIS — G81.91 RIGHT HEMIPARESIS (H): ICD-10-CM

## 2019-04-03 NOTE — TELEPHONE ENCOUNTER
"Requested Prescriptions   Pending Prescriptions Disp Refills     lisinopril (PRINIVIL/ZESTRIL) 10 MG tablet [Pharmacy Med Name: LISINOPRIL 10MG TABLETS]  Last Written Prescription Date:  3/22/2018  Last Fill Quantity: 90 tablet,  # refills: 3   Last office visit: 5/10/2018 with prescribing provider:  TERA Cano   Future Office Visit:     90 tablet 0     Sig: TAKE 1 TABLET(10 MG) BY MOUTH DAILY    ACE Inhibitors (Including Combos) Protocol Failed - 4/3/2019  2:10 PM       Failed - Normal serum creatinine on file in past 12 months    Recent Labs   Lab Test 03/14/18  0928   CR 0.74            Failed - Normal serum potassium on file in past 12 months    Recent Labs   Lab Test 03/14/18  0928   POTASSIUM 3.8            Passed - Blood pressure under 140/90 in past 12 months    BP Readings from Last 3 Encounters:   05/10/18 114/72   02/08/18 131/71   10/18/17 129/67            Passed - Recent (12 mo) or future (30 days) visit within the authorizing provider's specialty    Patient had office visit in the last 12 months or has a visit in the next 30 days with authorizing provider or within the authorizing provider's specialty.  See \"Patient Info\" tab in inbasket, or \"Choose Columns\" in Meds & Orders section of the refill encounter.             Passed - Medication is active on med list       Passed - Patient is age 18 or older          "

## 2019-04-05 RX ORDER — LISINOPRIL 10 MG/1
TABLET ORAL
Qty: 60 TABLET | Refills: 0 | Status: SHIPPED | OUTPATIENT
Start: 2019-04-05

## 2019-04-05 NOTE — TELEPHONE ENCOUNTER
Martina given x1 today. Patient needs office visit, please schedule. Patient is due for an annual visit next month.     Thanks!  Manuel Torres RN

## 2019-04-10 NOTE — TELEPHONE ENCOUNTER
Only phone number on file is out of service. [employer] message and letter sent.    Thanks!  Manuel Sykes

## 2019-10-28 NOTE — TELEPHONE ENCOUNTER
Called and spoke to Mac, he says his wife will be home at 6pm and will bring him to the ER at that time. He states he is safe in the interim. His son will be home soon.     Chloe Azar RN   March 23, 2017 2:31 PM  [facility] Triage   [phone]    English

## 2019-11-07 ENCOUNTER — HEALTH MAINTENANCE LETTER (OUTPATIENT)
Age: 54
End: 2019-11-07

## 2020-08-26 NOTE — PROGRESS NOTES
Postural Assessment Scale for Stroke    Maintaining a posture  1. Sitting without support:3  2. Standing with support:3  3. Standing without support:1  4. Standing on nonparetic le  5. Standing on paretic le    Changing posture  6. Supine to affected side lateral:3  7. Supine to nonaffected side lateral:3  8. Supine to sitting up on the edge of the table:3  9. Sitting on the edge of the table to supine:3  10. Sitting to standing up:2  11. Standing up to sitting down:2  12. Standing, picking up a pencil from the floor:0    Total score:   24/36   inflamed appendix, fluid in pelvis

## 2020-11-29 ENCOUNTER — HEALTH MAINTENANCE LETTER (OUTPATIENT)
Age: 55
End: 2020-11-29

## 2021-09-25 ENCOUNTER — HEALTH MAINTENANCE LETTER (OUTPATIENT)
Age: 56
End: 2021-09-25

## 2022-01-15 ENCOUNTER — HEALTH MAINTENANCE LETTER (OUTPATIENT)
Age: 57
End: 2022-01-15

## 2022-12-26 ENCOUNTER — HEALTH MAINTENANCE LETTER (OUTPATIENT)
Age: 57
End: 2022-12-26

## 2023-04-22 ENCOUNTER — HEALTH MAINTENANCE LETTER (OUTPATIENT)
Age: 58
End: 2023-04-22